# Patient Record
Sex: MALE | Race: WHITE | NOT HISPANIC OR LATINO | Employment: OTHER | URBAN - METROPOLITAN AREA
[De-identification: names, ages, dates, MRNs, and addresses within clinical notes are randomized per-mention and may not be internally consistent; named-entity substitution may affect disease eponyms.]

---

## 2018-05-05 PROBLEM — J18.9 PNEUMONIA: Status: ACTIVE | Noted: 2018-05-05

## 2018-05-06 ENCOUNTER — HOSPITAL ENCOUNTER (INPATIENT)
Facility: HOSPITAL | Age: 63
LOS: 26 days | Discharge: NURSING, REHAB OR LONG-TERM CARE | DRG: 871 | End: 2018-06-01
Attending: INTERNAL MEDICINE | Admitting: INTERNAL MEDICINE
Payer: MEDICAID

## 2018-05-06 DIAGNOSIS — J84.10 FIBROSIS OF LUNG: ICD-10-CM

## 2018-05-06 DIAGNOSIS — J18.9 PNEUMONIA: ICD-10-CM

## 2018-05-06 DIAGNOSIS — Z74.09 IMPAIRED MOBILITY AND ADLS: ICD-10-CM

## 2018-05-06 DIAGNOSIS — I50.9 HEART FAILURE: ICD-10-CM

## 2018-05-06 DIAGNOSIS — R06.03 RESPIRATORY DISTRESS: ICD-10-CM

## 2018-05-06 DIAGNOSIS — E44.0 MODERATE MALNUTRITION: ICD-10-CM

## 2018-05-06 DIAGNOSIS — E83.42 HYPOMAGNESEMIA: ICD-10-CM

## 2018-05-06 DIAGNOSIS — N17.9 AKI (ACUTE KIDNEY INJURY): Primary | ICD-10-CM

## 2018-05-06 DIAGNOSIS — R00.0 TACHYCARDIA: ICD-10-CM

## 2018-05-06 DIAGNOSIS — R26.9 GAIT DISTURBANCE: ICD-10-CM

## 2018-05-06 DIAGNOSIS — J96.02 ACUTE RESPIRATORY FAILURE WITH HYPOXIA AND HYPERCAPNIA: ICD-10-CM

## 2018-05-06 DIAGNOSIS — Z78.9 IMPAIRED MOBILITY AND ADLS: ICD-10-CM

## 2018-05-06 DIAGNOSIS — J96.01 ACUTE RESPIRATORY FAILURE WITH HYPOXIA AND HYPERCAPNIA: ICD-10-CM

## 2018-05-06 DIAGNOSIS — J69.0 ASPIRATION PNEUMONIA OF BOTH LUNGS, UNSPECIFIED ASPIRATION PNEUMONIA TYPE, UNSPECIFIED PART OF LUNG: ICD-10-CM

## 2018-05-06 DIAGNOSIS — I71.20 THORACIC AORTIC ANEURYSM WITHOUT RUPTURE: ICD-10-CM

## 2018-05-06 DIAGNOSIS — J69.0 ASPIRATION PNEUMONIA: ICD-10-CM

## 2018-05-06 PROBLEM — E87.1 HYPONATREMIA: Status: ACTIVE | Noted: 2018-05-06

## 2018-05-06 PROBLEM — A41.9 SEPSIS: Status: ACTIVE | Noted: 2018-05-06

## 2018-05-06 PROBLEM — J96.92 RESPIRATORY FAILURE WITH HYPOXIA AND HYPERCAPNIA: Status: ACTIVE | Noted: 2018-05-06

## 2018-05-06 PROBLEM — J96.91 RESPIRATORY FAILURE WITH HYPOXIA AND HYPERCAPNIA: Status: ACTIVE | Noted: 2018-05-06

## 2018-05-06 LAB
ABO + RH BLD: NORMAL
ALBUMIN SERPL BCP-MCNC: 1.6 G/DL
ALLENS TEST: ABNORMAL
ALLENS TEST: ABNORMAL
ALP SERPL-CCNC: 77 U/L
ALT SERPL W/O P-5'-P-CCNC: 12 U/L
ANION GAP SERPL CALC-SCNC: 10 MMOL/L
ANION GAP SERPL CALC-SCNC: 12 MMOL/L
ANION GAP SERPL CALC-SCNC: 12 MMOL/L
ANION GAP SERPL CALC-SCNC: 17 MMOL/L
ANION GAP SERPL CALC-SCNC: NORMAL MMOL/L
ANISOCYTOSIS BLD QL SMEAR: SLIGHT
APTT BLDCRRT: 23.4 SEC
AST SERPL-CCNC: 21 U/L
BACTERIA #/AREA URNS AUTO: ABNORMAL /HPF
BASOPHILS # BLD AUTO: 0.05 K/UL
BASOPHILS NFR BLD: 0.2 %
BILIRUB SERPL-MCNC: 0.3 MG/DL
BILIRUB UR QL STRIP: NEGATIVE
BLD GP AB SCN CELLS X3 SERPL QL: NORMAL
BNP SERPL-MCNC: 183 PG/ML
BUN SERPL-MCNC: 36 MG/DL
BUN SERPL-MCNC: 39 MG/DL
BUN SERPL-MCNC: 41 MG/DL
BUN SERPL-MCNC: 42 MG/DL
BUN SERPL-MCNC: NORMAL MG/DL
C DIFF GDH STL QL: NEGATIVE
C DIFF TOX A+B STL QL IA: NEGATIVE
CALCIUM SERPL-MCNC: 8.7 MG/DL
CALCIUM SERPL-MCNC: 8.8 MG/DL
CALCIUM SERPL-MCNC: 8.9 MG/DL
CALCIUM SERPL-MCNC: 9.1 MG/DL
CALCIUM SERPL-MCNC: NORMAL MG/DL
CHLORIDE SERPL-SCNC: 94 MMOL/L
CHLORIDE SERPL-SCNC: 98 MMOL/L
CHLORIDE SERPL-SCNC: 99 MMOL/L
CHLORIDE SERPL-SCNC: 99 MMOL/L
CHLORIDE SERPL-SCNC: NORMAL MMOL/L
CK SERPL-CCNC: 37 U/L
CLARITY UR REFRACT.AUTO: ABNORMAL
CO2 SERPL-SCNC: 23 MMOL/L
CO2 SERPL-SCNC: 28 MMOL/L
CO2 SERPL-SCNC: NORMAL MMOL/L
COLOR UR AUTO: ABNORMAL
CREAT SERPL-MCNC: 1.5 MG/DL
CREAT SERPL-MCNC: 1.6 MG/DL
CREAT SERPL-MCNC: 1.6 MG/DL
CREAT SERPL-MCNC: 1.7 MG/DL
CREAT SERPL-MCNC: NORMAL MG/DL
DELSYS: ABNORMAL
DIFFERENTIAL METHOD: ABNORMAL
EOSINOPHIL # BLD AUTO: 0 K/UL
EOSINOPHIL NFR BLD: 0 %
ERYTHROCYTE [DISTWIDTH] IN BLOOD BY AUTOMATED COUNT: 13.2 %
ERYTHROCYTE [SEDIMENTATION RATE] IN BLOOD BY WESTERGREN METHOD: 14 MM/H
EST. GFR  (AFRICAN AMERICAN): 48.9 ML/MIN/1.73 M^2
EST. GFR  (AFRICAN AMERICAN): 52.6 ML/MIN/1.73 M^2
EST. GFR  (AFRICAN AMERICAN): 52.6 ML/MIN/1.73 M^2
EST. GFR  (AFRICAN AMERICAN): 56.9 ML/MIN/1.73 M^2
EST. GFR  (AFRICAN AMERICAN): NORMAL ML/MIN/1.73 M^2
EST. GFR  (NON AFRICAN AMERICAN): 42.3 ML/MIN/1.73 M^2
EST. GFR  (NON AFRICAN AMERICAN): 45.5 ML/MIN/1.73 M^2
EST. GFR  (NON AFRICAN AMERICAN): 45.5 ML/MIN/1.73 M^2
EST. GFR  (NON AFRICAN AMERICAN): 49.2 ML/MIN/1.73 M^2
EST. GFR  (NON AFRICAN AMERICAN): NORMAL ML/MIN/1.73 M^2
FIO2: 25
FLUAV AG SPEC QL IA: NEGATIVE
FLUBV AG SPEC QL IA: NEGATIVE
GLUCOSE SERPL-MCNC: 106 MG/DL
GLUCOSE SERPL-MCNC: 110 MG/DL
GLUCOSE SERPL-MCNC: 118 MG/DL
GLUCOSE SERPL-MCNC: 132 MG/DL
GLUCOSE SERPL-MCNC: NORMAL MG/DL
GLUCOSE UR QL STRIP: NEGATIVE
HCO3 UR-SCNC: 25.4 MMOL/L (ref 24–28)
HCO3 UR-SCNC: 29.4 MMOL/L (ref 24–28)
HCT VFR BLD AUTO: 27.4 %
HGB BLD-MCNC: 8.6 G/DL
HGB UR QL STRIP: ABNORMAL
HYALINE CASTS UR QL AUTO: 0 /LPF
IMM GRANULOCYTES # BLD AUTO: 0.21 K/UL
IMM GRANULOCYTES NFR BLD AUTO: 1 %
INR PPP: 1.1
KETONES UR QL STRIP: NEGATIVE
LACTATE SERPL-SCNC: 1.6 MMOL/L
LEUKOCYTE ESTERASE UR QL STRIP: ABNORMAL
LYMPHOCYTES # BLD AUTO: 0.5 K/UL
LYMPHOCYTES NFR BLD: 2.2 %
MAGNESIUM SERPL-MCNC: 1.3 MG/DL
MAGNESIUM SERPL-MCNC: 1.9 MG/DL
MCH RBC QN AUTO: 30.3 PG
MCHC RBC AUTO-ENTMCNC: 31.4 G/DL
MCV RBC AUTO: 97 FL
MICROSCOPIC COMMENT: ABNORMAL
MODE: ABNORMAL
MONOCYTES # BLD AUTO: 1.3 K/UL
MONOCYTES NFR BLD: 6.4 %
NEUTROPHILS # BLD AUTO: 18.4 K/UL
NEUTROPHILS NFR BLD: 90.2 %
NITRITE UR QL STRIP: NEGATIVE
NRBC BLD-RTO: 0 /100 WBC
OSMOLALITY UR: 303 MOSM/KG
OVALOCYTES BLD QL SMEAR: ABNORMAL
PCO2 BLDA: 34.9 MMHG (ref 35–45)
PCO2 BLDA: 52 MMHG (ref 35–45)
PEEP: 5
PH SMN: 7.36 [PH] (ref 7.35–7.45)
PH SMN: 7.47 [PH] (ref 7.35–7.45)
PH UR STRIP: 5 [PH] (ref 5–8)
PHOSPHATE SERPL-MCNC: 1.9 MG/DL
PLATELET # BLD AUTO: 310 K/UL
PMV BLD AUTO: 8.9 FL
PO2 BLDA: 46 MMHG (ref 80–100)
PO2 BLDA: 48 MMHG (ref 40–60)
POC BE: 2 MMOL/L
POC BE: 4 MMOL/L
POC SATURATED O2: 81 % (ref 95–100)
POC SATURATED O2: 85 % (ref 95–100)
POC TCO2: 26 MMOL/L (ref 23–27)
POC TCO2: 31 MMOL/L (ref 24–29)
POIKILOCYTOSIS BLD QL SMEAR: SLIGHT
POLYCHROMASIA BLD QL SMEAR: ABNORMAL
POTASSIUM SERPL-SCNC: 3.5 MMOL/L
POTASSIUM SERPL-SCNC: 3.5 MMOL/L
POTASSIUM SERPL-SCNC: 3.9 MMOL/L
POTASSIUM SERPL-SCNC: 4.1 MMOL/L
POTASSIUM SERPL-SCNC: NORMAL MMOL/L
PROCALCITONIN SERPL IA-MCNC: 9.92 NG/ML
PROT SERPL-MCNC: 5.9 G/DL
PROT UR QL STRIP: ABNORMAL
PROTHROMBIN TIME: 11.4 SEC
RBC # BLD AUTO: 2.84 M/UL
RBC #/AREA URNS AUTO: 57 /HPF (ref 0–4)
SAMPLE: ABNORMAL
SAMPLE: ABNORMAL
SITE: ABNORMAL
SITE: ABNORMAL
SODIUM SERPL-SCNC: 132 MMOL/L
SODIUM SERPL-SCNC: 133 MMOL/L
SODIUM SERPL-SCNC: 134 MMOL/L
SODIUM SERPL-SCNC: 139 MMOL/L
SODIUM SERPL-SCNC: NORMAL MMOL/L
SODIUM UR-SCNC: 88 MMOL/L
SP GR UR STRIP: 1.01 (ref 1–1.03)
SPECIMEN SOURCE: NORMAL
TOXIC GRANULES BLD QL SMEAR: PRESENT
TROPONIN I SERPL DL<=0.01 NG/ML-MCNC: <0.006 NG/ML
URN SPEC COLLECT METH UR: ABNORMAL
UROBILINOGEN UR STRIP-ACNC: NEGATIVE EU/DL
VANCOMYCIN TROUGH SERPL-MCNC: 14.4 UG/ML
VT: 450
WBC # BLD AUTO: 20.44 K/UL
WBC #/AREA URNS AUTO: 16 /HPF (ref 0–5)

## 2018-05-06 PROCEDURE — 25000003 PHARM REV CODE 250: Performed by: INTERNAL MEDICINE

## 2018-05-06 PROCEDURE — 82803 BLOOD GASES ANY COMBINATION: CPT

## 2018-05-06 PROCEDURE — 80048 BASIC METABOLIC PNL TOTAL CA: CPT

## 2018-05-06 PROCEDURE — 37799 UNLISTED PX VASCULAR SURGERY: CPT

## 2018-05-06 PROCEDURE — 80202 ASSAY OF VANCOMYCIN: CPT

## 2018-05-06 PROCEDURE — 87040 BLOOD CULTURE FOR BACTERIA: CPT | Mod: 59

## 2018-05-06 PROCEDURE — 87070 CULTURE OTHR SPECIMN AEROBIC: CPT

## 2018-05-06 PROCEDURE — 94150 VITAL CAPACITY TEST: CPT

## 2018-05-06 PROCEDURE — 87205 SMEAR GRAM STAIN: CPT

## 2018-05-06 PROCEDURE — 83935 ASSAY OF URINE OSMOLALITY: CPT

## 2018-05-06 PROCEDURE — 85730 THROMBOPLASTIN TIME PARTIAL: CPT

## 2018-05-06 PROCEDURE — 80048 BASIC METABOLIC PNL TOTAL CA: CPT | Mod: 91

## 2018-05-06 PROCEDURE — 83735 ASSAY OF MAGNESIUM: CPT

## 2018-05-06 PROCEDURE — 27000221 HC OXYGEN, UP TO 24 HOURS

## 2018-05-06 PROCEDURE — 83605 ASSAY OF LACTIC ACID: CPT

## 2018-05-06 PROCEDURE — 99900026 HC AIRWAY MAINTENANCE (STAT)

## 2018-05-06 PROCEDURE — 36592 COLLECT BLOOD FROM PICC: CPT

## 2018-05-06 PROCEDURE — 99900035 HC TECH TIME PER 15 MIN (STAT)

## 2018-05-06 PROCEDURE — 80053 COMPREHEN METABOLIC PANEL: CPT

## 2018-05-06 PROCEDURE — 82550 ASSAY OF CK (CPK): CPT

## 2018-05-06 PROCEDURE — 99900017 HC EXTUBATION W/PARAMETERS (STAT)

## 2018-05-06 PROCEDURE — 84100 ASSAY OF PHOSPHORUS: CPT

## 2018-05-06 PROCEDURE — 63600175 PHARM REV CODE 636 W HCPCS: Performed by: STUDENT IN AN ORGANIZED HEALTH CARE EDUCATION/TRAINING PROGRAM

## 2018-05-06 PROCEDURE — 87449 NOS EACH ORGANISM AG IA: CPT | Mod: 91

## 2018-05-06 PROCEDURE — 94003 VENT MGMT INPAT SUBQ DAY: CPT

## 2018-05-06 PROCEDURE — 84484 ASSAY OF TROPONIN QUANT: CPT

## 2018-05-06 PROCEDURE — 63600175 PHARM REV CODE 636 W HCPCS: Performed by: INTERNAL MEDICINE

## 2018-05-06 PROCEDURE — 25000242 PHARM REV CODE 250 ALT 637 W/ HCPCS: Performed by: INTERNAL MEDICINE

## 2018-05-06 PROCEDURE — 99900022

## 2018-05-06 PROCEDURE — 5A1935Z RESPIRATORY VENTILATION, LESS THAN 24 CONSECUTIVE HOURS: ICD-10-PCS | Performed by: INTERNAL MEDICINE

## 2018-05-06 PROCEDURE — 36415 COLL VENOUS BLD VENIPUNCTURE: CPT

## 2018-05-06 PROCEDURE — 86901 BLOOD TYPING SEROLOGIC RH(D): CPT

## 2018-05-06 PROCEDURE — 84300 ASSAY OF URINE SODIUM: CPT

## 2018-05-06 PROCEDURE — 25000003 PHARM REV CODE 250: Performed by: STUDENT IN AN ORGANIZED HEALTH CARE EDUCATION/TRAINING PROGRAM

## 2018-05-06 PROCEDURE — 81001 URINALYSIS AUTO W/SCOPE: CPT

## 2018-05-06 PROCEDURE — 94640 AIRWAY INHALATION TREATMENT: CPT

## 2018-05-06 PROCEDURE — 84145 PROCALCITONIN (PCT): CPT

## 2018-05-06 PROCEDURE — 83880 ASSAY OF NATRIURETIC PEPTIDE: CPT

## 2018-05-06 PROCEDURE — 87086 URINE CULTURE/COLONY COUNT: CPT

## 2018-05-06 PROCEDURE — 94761 N-INVAS EAR/PLS OXIMETRY MLT: CPT

## 2018-05-06 PROCEDURE — 20000000 HC ICU ROOM

## 2018-05-06 PROCEDURE — 87449 NOS EACH ORGANISM AG IA: CPT

## 2018-05-06 PROCEDURE — C9113 INJ PANTOPRAZOLE SODIUM, VIA: HCPCS | Performed by: INTERNAL MEDICINE

## 2018-05-06 PROCEDURE — 87400 INFLUENZA A/B EACH AG IA: CPT | Mod: 59

## 2018-05-06 PROCEDURE — 94002 VENT MGMT INPAT INIT DAY: CPT

## 2018-05-06 PROCEDURE — 85610 PROTHROMBIN TIME: CPT

## 2018-05-06 PROCEDURE — 99291 CRITICAL CARE FIRST HOUR: CPT | Mod: ,,, | Performed by: INTERNAL MEDICINE

## 2018-05-06 RX ORDER — DEXMEDETOMIDINE HYDROCHLORIDE 4 UG/ML
0.2 INJECTION, SOLUTION INTRAVENOUS CONTINUOUS
Status: DISCONTINUED | OUTPATIENT
Start: 2018-05-06 | End: 2018-05-08

## 2018-05-06 RX ORDER — FOLIC ACID 1 MG/1
1 TABLET ORAL DAILY
Status: DISCONTINUED | OUTPATIENT
Start: 2018-05-07 | End: 2018-05-07

## 2018-05-06 RX ORDER — ENOXAPARIN SODIUM 100 MG/ML
40 INJECTION SUBCUTANEOUS EVERY 24 HOURS
Status: DISCONTINUED | OUTPATIENT
Start: 2018-05-06 | End: 2018-06-01 | Stop reason: HOSPADM

## 2018-05-06 RX ORDER — MAGNESIUM SULFATE HEPTAHYDRATE 40 MG/ML
2 INJECTION, SOLUTION INTRAVENOUS ONCE
Status: COMPLETED | OUTPATIENT
Start: 2018-05-06 | End: 2018-05-06

## 2018-05-06 RX ORDER — VANCOMYCIN/0.9 % SOD CHLORIDE 1 G/100 ML
1000 PLASTIC BAG, INJECTION (ML) INTRAVENOUS
Status: DISCONTINUED | OUTPATIENT
Start: 2018-05-06 | End: 2018-05-07

## 2018-05-06 RX ORDER — POTASSIUM CHLORIDE 20 MEQ/15ML
40 SOLUTION ORAL ONCE
Status: COMPLETED | OUTPATIENT
Start: 2018-05-06 | End: 2018-05-06

## 2018-05-06 RX ORDER — PANTOPRAZOLE SODIUM 40 MG/10ML
40 INJECTION, POWDER, LYOPHILIZED, FOR SOLUTION INTRAVENOUS EVERY 24 HOURS
Status: DISCONTINUED | OUTPATIENT
Start: 2018-05-06 | End: 2018-05-06

## 2018-05-06 RX ORDER — FENTANYL CITRATE-0.9 % NACL/PF 10 MCG/ML
PLASTIC BAG, INJECTION (ML) INTRAVENOUS CONTINUOUS
Status: DISCONTINUED | OUTPATIENT
Start: 2018-05-06 | End: 2018-05-06

## 2018-05-06 RX ORDER — PANTOPRAZOLE SODIUM 40 MG/10ML
40 INJECTION, POWDER, LYOPHILIZED, FOR SOLUTION INTRAVENOUS DAILY
Status: DISCONTINUED | OUTPATIENT
Start: 2018-05-06 | End: 2018-05-06

## 2018-05-06 RX ORDER — IPRATROPIUM BROMIDE AND ALBUTEROL SULFATE 2.5; .5 MG/3ML; MG/3ML
3 SOLUTION RESPIRATORY (INHALATION)
Status: DISCONTINUED | OUTPATIENT
Start: 2018-05-06 | End: 2018-05-17

## 2018-05-06 RX ORDER — THIAMINE HCL 100 MG
100 TABLET ORAL DAILY
Status: DISCONTINUED | OUTPATIENT
Start: 2018-05-07 | End: 2018-05-07

## 2018-05-06 RX ORDER — SODIUM CHLORIDE 0.9 % (FLUSH) 0.9 %
3 SYRINGE (ML) INJECTION
Status: DISCONTINUED | OUTPATIENT
Start: 2018-05-06 | End: 2018-06-01 | Stop reason: HOSPADM

## 2018-05-06 RX ADMIN — IPRATROPIUM BROMIDE AND ALBUTEROL SULFATE 3 ML: .5; 3 SOLUTION RESPIRATORY (INHALATION) at 07:05

## 2018-05-06 RX ADMIN — ENOXAPARIN SODIUM 40 MG: 100 INJECTION SUBCUTANEOUS at 04:05

## 2018-05-06 RX ADMIN — POTASSIUM CHLORIDE 40 MEQ: 20 SOLUTION ORAL at 06:05

## 2018-05-06 RX ADMIN — VANCOMYCIN HYDROCHLORIDE 1 G: 10 INJECTION, POWDER, LYOPHILIZED, FOR SOLUTION INTRAVENOUS at 06:05

## 2018-05-06 RX ADMIN — DEXTROSE 40 MG: 50 INJECTION, SOLUTION INTRAVENOUS at 08:05

## 2018-05-06 RX ADMIN — AZITHROMYCIN MONOHYDRATE 500 MG: 500 INJECTION, POWDER, LYOPHILIZED, FOR SOLUTION INTRAVENOUS at 07:05

## 2018-05-06 RX ADMIN — Medication 2500 MCG: at 01:05

## 2018-05-06 RX ADMIN — MAGNESIUM SULFATE IN WATER 2 G: 40 INJECTION, SOLUTION INTRAVENOUS at 04:05

## 2018-05-06 RX ADMIN — PIPERACILLIN AND TAZOBACTAM 4.5 G: 4; .5 INJECTION, POWDER, LYOPHILIZED, FOR SOLUTION INTRAVENOUS; PARENTERAL at 04:05

## 2018-05-06 RX ADMIN — PIPERACILLIN AND TAZOBACTAM 4.5 G: 4; .5 INJECTION, POWDER, LYOPHILIZED, FOR SOLUTION INTRAVENOUS; PARENTERAL at 02:05

## 2018-05-06 RX ADMIN — PIPERACILLIN AND TAZOBACTAM 4.5 G: 4; .5 INJECTION, POWDER, LYOPHILIZED, FOR SOLUTION INTRAVENOUS; PARENTERAL at 10:05

## 2018-05-06 RX ADMIN — SODIUM CHLORIDE 1000 ML: 0.9 INJECTION, SOLUTION INTRAVENOUS at 03:05

## 2018-05-06 RX ADMIN — IPRATROPIUM BROMIDE AND ALBUTEROL SULFATE 3 ML: .5; 3 SOLUTION RESPIRATORY (INHALATION) at 10:05

## 2018-05-06 RX ADMIN — SODIUM CHLORIDE 500 ML: 9 INJECTION, SOLUTION INTRAVENOUS at 06:05

## 2018-05-06 RX ADMIN — DEXMEDETOMIDINE HYDROCHLORIDE 0.5 MCG/KG/HR: 100 INJECTION, SOLUTION, CONCENTRATE INTRAVENOUS at 08:05

## 2018-05-06 RX ADMIN — IPRATROPIUM BROMIDE AND ALBUTEROL SULFATE 3 ML: .5; 3 SOLUTION RESPIRATORY (INHALATION) at 01:05

## 2018-05-06 RX ADMIN — DEXMEDETOMIDINE HYDROCHLORIDE 0.2 MCG/KG/HR: 100 INJECTION, SOLUTION, CONCENTRATE INTRAVENOUS at 02:05

## 2018-05-06 NOTE — PLAN OF CARE
Problem: Patient Care Overview  Goal: Plan of Care Review  Dx: Pneumonia   Hx: HTN, COPD and alcoholism, who is a transfer from [facility] ICU to [facility] on mechanical ventilation after being found unresponsive at home    4/5: Transferred to [facility] on mechanical ventilation. Dopamine gtt off.   4/6: Pt extubated         RN  MAP>65    *Pt likes [employer] channel 3     Pt AAOx4. VSS. No trauma, injury, or falls throughout the day. Pt extubated this afternoon and is now on 3L NC. Urine output >50cc/hr from hess. Pt has denied pain and nausea this evening. Pt is resting in bed. No distress noted. Will continue to monitor.

## 2018-05-06 NOTE — HPI
The patient is a 64 y/o M new to our system, with HTN, COPD and alcoholism, who is transfer from [facility] ICU to [facility] on mechanical ventilation.    The patient lives with his brother across the street from his sister. He was found unresponsive in a chair with small amount of blood in his mouth by his nephew and taken to the hospital by EMS on 5/3. Patient drinks every day and has been having frequent falls recently. Per sister he has quit smoking 15 years ago.  In the ED at OSH he was found to be hypoxic and hypotensive, with undetectable EtOH levels, leukocytosis (24K) and hyponatremia (119) in the labs. he received narcan x1, IV fluids, started on metronidazole and ceftriaxone (also x1 of zosyn, azithromycin, vancomycin, ceftaroline) for aspiration pneumonia, and was put on 15L of Oxygen with non-rebreather, which improved his mental status and BP, however on 4/5 upon weaning off of oxygen he developed hypoxic hypercapnic respiratory failure and subsequent acidosis (pH 7.10), therefore he was intubated and transferred to the ICU. Patient arrived to [facility] on dopamine gtt which per notes was started to maintain BP in the setting of hyponatremia.

## 2018-05-06 NOTE — ASSESSMENT & PLAN NOTE
Former smoker with Hx of COPD, overlapped with volume overload due to acute decompensated HF  2+ pitting edema on lower extremities + bilateral rhonchi and wheezing on the exam    PFT on 5/31/2017 at OSH:    Moderate obstructive + mild restrictive pattern    FEV1/FVC 60%  2D echo on 2/2018 at OSH:    EF 55-60%   Normal LA pressure   mild TVR  BNP on 5/4: 349    - Repeat CXR: fibrotic changes in DIAMANTE + patchy infiltration specially in RML   - Will obtain ECG and Troponin, and repeat BNP  - will obtain ABG   - will continue mechanical ventilation for now, possible SBT during the day  - will consider diuresis after the BP is improved  - will require PT/OT evaluation after extubation

## 2018-05-06 NOTE — PLAN OF CARE
Problem: Patient Care Overview  Goal: Plan of Care Review  Outcome: Ongoing (interventions implemented as appropriate)  POC reviewed with patient who shows some evidence of learning through nodding and hand gestures. MAPS being maintained above 65, Precedex currently at 0.6. Banda output has been minimal ( see intake and output flow sheet). Pt remained free from falls throughout the shift VSS. No distress noted, will continue to monitor.

## 2018-05-06 NOTE — ASSESSMENT & PLAN NOTE
In initial labs at OSH:  EtOH undetectable, urine tox negative     - currently not agitated, no evidence of withdrawal or hepatic encephalopathy on exam  - LFTs within normal limits  - will start on thiamine and folic acid daily  - will monitor the electrolytes and replace as needed  - will need SLP evaluation after extubation

## 2018-05-06 NOTE — PROGRESS NOTES
Pt was transferred from [facility],was placed on vent settings per EMT,Beth Astorga was at bedside made vent changes,vbg was done she is aware of results,requested endotracheal tube be inserted to 26cm at lips,no abg was ordered at this time sats were adequate.

## 2018-05-06 NOTE — ASSESSMENT & PLAN NOTE
Initial Na at  which improved to 129   Differentials include abnormal renal filtration due to ROHITH vs low intravascular volume due to decompensated HF vs GI loss    - Repeated BMP upon arrival to [facility] 132  - Will monitor closely with BMP q8 hr   - will obtain urine Na and osmolality

## 2018-05-06 NOTE — H&P
[facility]  Critical Care Medicine  History & Physical    Patient Name: Ryder Boo  MRN: [id number]  Admission Date: 5/6/2018  Hospital Length of Stay: 0 days  Code Status: Full Code  Attending Physician: Uzair Jacobo MD   Primary Care Provider: Primary Doctor No   Principal Problem: <principal problem not specified>    Subjective:     HPI:  The patient is a 62 y/o M new to our system, with HTN, COPD and alcoholism, who is transfer from [facility] ICU to [facility] on mechanical ventilation.    The patient lives with his brother across the street from his sister. He was found unresponsive in a chair with small amount of blood in his mouth by his nephew and taken to the hospital by EMS on 5/3. Patient drinks every day and has been having frequent falls recently. Per sister he has quit smoking 15 years ago.  In the ED at OSH he was found to be hypoxic and hypotensive, with undetectable EtOH levels, leukocytosis (24K) and hyponatremia (119) in the labs. he received narcan x1, IV fluids, started on metronidazole and ceftriaxone (also x1 of zosyn, azithromycin, vancomycin, ceftaroline) for aspiration pneumonia, and was put on 15L of Oxygen with non-rebreather, which improved his mental status and BP, however on 4/5 upon weaning off of oxygen he developed hypoxic hypercapnic respiratory failure and subsequent acidosis (pH 7.10), therefore he was intubated and transferred to the ICU. Patient arrived to [facility] on dopamine gtt which per notes was started to maintain BP in the setting of hyponatremia.     Hospital/ICU Course:  No notes on file     No past medical history on file.    No past surgical history on file.    Review of patient's allergies indicates:  No Known Allergies    Family History     None        Social History Main Topics    Smoking status: Not on file    Smokeless tobacco: Not on file    Alcohol use Not on file    Drug use: Unknown    Sexual activity: Not on file      Review of  Systems   Unable to perform ROS: Intubated   Constitutional: Negative for fever.   Neurological: Negative for seizures.   Psychiatric/Behavioral: Negative for agitation. The patient is not nervous/anxious.      Objective:     Vital Signs (Most Recent):  Temp: 99.8 °F (37.7 °C) (05/06/18 0045)  Pulse: 105 (05/06/18 0045)  BP: (!) 132/98 (05/06/18 0045)  SpO2: 99 % (05/06/18 0045) Vital Signs (24h Range):  Temp:  [99.8 °F (37.7 °C)] 99.8 °F (37.7 °C)  Pulse:  [105] 105  SpO2:  [99 %] 99 %  BP: (132)/(98) 132/98      There is no height or weight on file to calculate BMI.    No intake or output data in the 24 hours ending 05/06/18 0134    Physical Exam   Constitutional: He appears well-developed and well-nourished. He is cooperative. No distress. He is intubated.   Disheveled, poor hygiene, in no apparent distress, comfortably interacting with providers by writing or hand gestures    HENT:   Head: Normocephalic and atraumatic.   Mouth/Throat: Mucous membranes are normal.   Eyes: Conjunctivae and EOM are normal. Pupils are equal, round, and reactive to light.   Neck: Normal range of motion.   Cardiovascular: Normal rate, regular rhythm and normal heart sounds.  Exam reveals no gallop.    No murmur heard.  Pulses:       Radial pulses are 2+ on the right side, and 2+ on the left side.        Dorsalis pedis pulses are 2+ on the right side, and 2+ on the left side.   Pulmonary/Chest: Effort normal. No accessory muscle usage. No tachypnea. He is intubated. No respiratory distress. He has no decreased breath sounds. He has wheezes (bilateral ). He has rhonchi. He has no rales.   Abdominal: Soft. Bowel sounds are normal. He exhibits no distension. There is generalized tenderness. There is no rigidity, no rebound and no guarding.   Musculoskeletal: Normal range of motion. He exhibits edema (2+ pitting, Bl LE). He exhibits no tenderness.   Neurological: He is alert. He displays no tremor. He displays no seizure activity.   He is  alert and comfortable  Follows all commands, good comprehension  Moves all 4 extremities symmetrically    Skin: Skin is warm. Capillary refill takes less than 2 seconds. No rash noted. He is not diaphoretic. No cyanosis or erythema.   Psychiatric: He has a normal mood and affect. His behavior is normal.   Nursing note and vitals reviewed.      Vents:  Vent Mode: A/C (05/06/18 0045)  Set Rate: 14 bmp (05/06/18 0045)  Vt Set: 530 mL (05/06/18 0045)  PEEP/CPAP: 5 cmH20 (05/06/18 0045)  Oxygen Concentration (%): 80 (05/06/18 0045)  Peak Airway Pressure: 30 cmH2O (05/06/18 0045)  Total Ve: 10.8 mL (05/06/18 0045)  Lines/Drains/Airways     Peripheral Intravenous Line                 Peripheral IV - Single Lumen 05/05/18 Left Antecubital 1 day         Peripheral IV - Single Lumen 05/05/18 Left Forearm 1 day         Peripheral IV - Single Lumen 05/05/18 Right Hand 1 day         Peripheral IV - Single Lumen 05/06/18 0102 Right Forearm less than 1 day              Significant Labs:    CBC/Anemia Profile:    Recent Labs  Lab 05/06/18 0101   WBC 20.44*   HGB 8.6*   HCT 27.4*      MCV 97   RDW 13.2        Chemistries:    Recent Labs  Lab 05/06/18 0101   *   K 4.1   CL 94*   CO2 28   BUN 36*   CREATININE 1.5*   CALCIUM 8.7   ALBUMIN 1.6*   PROT 5.9*   BILITOT 0.3   ALKPHOS 77   ALT 12   AST 21   MG 1.3*   PHOS 1.9*       ABGs:   Recent Labs  Lab 05/06/18 0104   PH 7.360   PCO2 52.0*   HCO3 29.4*   POCSATURATED 81*   BE 4     Blood Culture: No results for input(s): LABBLOO in the last 48 hours.  Coagulation:   Recent Labs  Lab 05/06/18 0101   INR 1.1   APTT 23.4     Lactic Acid:   Recent Labs  Lab 05/06/18 0101   LACTATE 1.6     Urine Culture: No results for input(s): LABURIN in the last 48 hours.    Significant Imaging: CXR: I have reviewed all pertinent results/findings within the past 24 hours and my personal findings are:  multifocal areas of consolidations with worsening of the R middle lobe infiltration  compared to the previous imaging    Assessment/Plan:     Psychiatric   Alcoholism /alcohol abuse    In initial labs at OSH:  EtOH undetectable, urine tox negative     - currently not agitated, no evidence of withdrawal or hepatic encephalopathy on exam  - LFTs within normal limits  - will start on thiamine and folic acid daily  - will monitor the electrolytes and replace as needed  - will need SLP evaluation after extubation        Pulmonary   Respiratory failure with hypoxia and hypercapnia    Former smoker with Hx of COPD, overlapped with volume overload due to acute decompensated HF  2+ pitting edema on lower extremities + bilateral rhonchi and wheezing on the exam    PFT on 5/31/2017 at OSH:    Moderate obstructive + mild restrictive pattern    FEV1/FVC 60%  2D echo on 2/2018 at OSH:    EF 55-60%   Normal LA pressure   mild TVR  BNP on 5/4: 349    - Repeat CXR: fibrotic changes in DIAMANTE + patchy infiltration specially in RML   - Will obtain ECG and Troponin, and repeat BNP  - will obtain ABG   - will continue mechanical ventilation for now, possible SBT during the day  - will consider diuresis after the BP is improved  - will require PT/OT evaluation after extubation            Pneumonia    At OSH:  WBC elevated up to 38K  CXR with bilateral multifocal infiltration (more pronounced in RML)  Most likely due to aspiration secondary to AMS    - currently afebrile, mildly tachypneic, with scattered rhonchi and wheezing on exam  - Repeat CXR: with interval worsening of infiltration/consolidation in RML  - repeat WBC 20K (90% PMN), procalcitonin -> 9.9  - obtained influenza Ag -> negative, will obtain RVP  - respiratory cultures pending  - will obtain Legionella Ag  - will cover broadly with vancomycin + zosyn + azithromycin  - will continue mechanical ventilation for now with possible SBT during the day        Renal/   ROHITH (acute kidney injury)    Initial Cr at OSH elevated to 2.8 which trended down with IV  fluids  Most likely due to poor PO intake and dehydration    - Repeat BMP upon arrival to [facility] 1.5  - Will obtain urine studies  - Will obtain renal US  - will monitor UOP closely          Hyponatremia    Initial Na at  which improved to 129   Differentials include abnormal renal filtration due to ROHITH vs low intravascular volume due to decompensated HF vs GI loss    - Repeated BMP upon arrival to [facility] 132  - Will monitor closely with BMP q8 hr   - will obtain urine Na and osmolality        ID   Sepsis    At OSH:  Lactic acid 4.1 -> 2.5 -> 1.6  WBC 24K -> 38K  U/a negative  CXR with evidence of RML consolidation  Upon arrival to [facility], afebrile, RR 28-33 on ventilator, BP 82/54  WBC 20, lactic acid 1.6, procal 9.9  qSOFA 1/3, SIRS 2/4    - Blood, urine and respiratory cultures pending  - will repeat u/a and CXR -> interval worsening of infiltrations  - will give NS bolus 1L  And monitor his response  - if no appropriate response will need a central line and vasopressors to maintain MAP>65  - will continue on broad spectrum abx            Critical Care Daily Checklist:    A: Awake: RASS Goal/Actual Goal:    Actual: [place] Agitation Sedation Scale (RASS): Alert and calm   B: Spontaneous Breathing Trial Performed?     C: SAT & SBT Coordinated?  No                      D: Delirium: CAM-ICU     E: Early Mobility Performed? No   F: Feeding Goal:    Status:     Current Diet Order   Procedures    Diet NPO      AS: Analgesia/Sedation precedex   T: Thromboembolic Prophylaxis SQH   H: HOB > 300 Yes   U: Stress Ulcer Prophylaxis (if needed) PPI   G: Glucose Control No   B: Bowel Function Stool Occurrence: 0   I: Indwelling Catheter (Lines & Banda) Necessity Banda   D: De-escalation of Antimicrobials/Pharmacotherapies No    Plan for the day/ETD Continue BS abx    Code Status:  Family/Goals of Care: Full Code         Critical secondary to Patient has a condition that poses threat to life and bodily function: Severe  Respiratory Distress and sepsis     Critical care was time spent personally by me on the following activities: development of treatment plan with patient or surrogate and bedside caregivers, discussions with consultants, evaluation of patient's response to treatment, examination of patient, ordering and performing treatments and interventions, ordering and review of laboratory studies, ordering and review of radiographic studies, pulse oximetry, re-evaluation of patient's condition. This critical care time did not overlap with that of any other provider or involve time for any procedures.     Fanny Carpio MD  Critical Care Medicine  [facility]

## 2018-05-06 NOTE — PROGRESS NOTES
Pt extubated per MD order. Pt tolerated extubation well and placed on 5L NC. Will continue to monitor.

## 2018-05-06 NOTE — SUBJECTIVE & OBJECTIVE
No past medical history on file.    No past surgical history on file.    Review of patient's allergies indicates:  No Known Allergies    Family History     None        Social History Main Topics    Smoking status: Not on file    Smokeless tobacco: Not on file    Alcohol use Not on file    Drug use: Unknown    Sexual activity: Not on file      Review of Systems   Unable to perform ROS: Intubated   Constitutional: Negative for fever.   Neurological: Negative for seizures.   Psychiatric/Behavioral: Negative for agitation. The patient is not nervous/anxious.      Objective:     Vital Signs (Most Recent):  Temp: 99.8 °F (37.7 °C) (05/06/18 0045)  Pulse: 105 (05/06/18 0045)  BP: (!) 132/98 (05/06/18 0045)  SpO2: 99 % (05/06/18 0045) Vital Signs (24h Range):  Temp:  [99.8 °F (37.7 °C)] 99.8 °F (37.7 °C)  Pulse:  [105] 105  SpO2:  [99 %] 99 %  BP: (132)/(98) 132/98      There is no height or weight on file to calculate BMI.    No intake or output data in the 24 hours ending 05/06/18 0134    Physical Exam   Constitutional: He appears well-developed and well-nourished. He is cooperative. No distress. He is intubated.   Disheveled, poor hygiene, in no apparent distress, comfortably interacting with providers by writing or hand gestures    HENT:   Head: Normocephalic and atraumatic.   Mouth/Throat: Mucous membranes are normal.   Eyes: Conjunctivae and EOM are normal. Pupils are equal, round, and reactive to light.   Neck: Normal range of motion.   Cardiovascular: Normal rate, regular rhythm and normal heart sounds.  Exam reveals no gallop.    No murmur heard.  Pulses:       Radial pulses are 2+ on the right side, and 2+ on the left side.        Dorsalis pedis pulses are 2+ on the right side, and 2+ on the left side.   Pulmonary/Chest: Effort normal. No accessory muscle usage. No tachypnea. He is intubated. No respiratory distress. He has no decreased breath sounds. He has wheezes (bilateral ). He has rhonchi. He has no  rales.   Abdominal: Soft. Bowel sounds are normal. He exhibits no distension. There is generalized tenderness. There is no rigidity, no rebound and no guarding.   Musculoskeletal: Normal range of motion. He exhibits edema (2+ pitting, Bl LE). He exhibits no tenderness.   Neurological: He is alert. He displays no tremor. He displays no seizure activity.   He is alert and comfortable  Follows all commands, good comprehension  Moves all 4 extremities symmetrically    Skin: Skin is warm. Capillary refill takes less than 2 seconds. No rash noted. He is not diaphoretic. No cyanosis or erythema.   Psychiatric: He has a normal mood and affect. His behavior is normal.   Nursing note and vitals reviewed.      Vents:  Vent Mode: A/C (05/06/18 0045)  Set Rate: 14 bmp (05/06/18 0045)  Vt Set: 530 mL (05/06/18 0045)  PEEP/CPAP: 5 cmH20 (05/06/18 0045)  Oxygen Concentration (%): 80 (05/06/18 0045)  Peak Airway Pressure: 30 cmH2O (05/06/18 0045)  Total Ve: 10.8 mL (05/06/18 0045)  Lines/Drains/Airways     Peripheral Intravenous Line                 Peripheral IV - Single Lumen 05/05/18 Left Antecubital 1 day         Peripheral IV - Single Lumen 05/05/18 Left Forearm 1 day         Peripheral IV - Single Lumen 05/05/18 Right Hand 1 day         Peripheral IV - Single Lumen 05/06/18 0102 Right Forearm less than 1 day              Significant Labs:    CBC/Anemia Profile:    Recent Labs  Lab 05/06/18 0101   WBC 20.44*   HGB 8.6*   HCT 27.4*      MCV 97   RDW 13.2        Chemistries:    Recent Labs  Lab 05/06/18 0101   *   K 4.1   CL 94*   CO2 28   BUN 36*   CREATININE 1.5*   CALCIUM 8.7   ALBUMIN 1.6*   PROT 5.9*   BILITOT 0.3   ALKPHOS 77   ALT 12   AST 21   MG 1.3*   PHOS 1.9*       ABGs:   Recent Labs  Lab 05/06/18 0104   PH 7.360   PCO2 52.0*   HCO3 29.4*   POCSATURATED 81*   BE 4     Blood Culture: No results for input(s): LABBLOO in the last 48 hours.  Coagulation:   Recent Labs  Lab 05/06/18  0101   INR 1.1   APTT  23.4     Lactic Acid:   Recent Labs  Lab 05/06/18  0101   LACTATE 1.6     Urine Culture: No results for input(s): LABURIN in the last 48 hours.    Significant Imaging: CXR: I have reviewed all pertinent results/findings within the past 24 hours and my personal findings are:  multifocal areas of consolidations with worsening of the R middle lobe infiltration compared to the previous imaging

## 2018-05-06 NOTE — ASSESSMENT & PLAN NOTE
At OSH:  Lactic acid 4.1 -> 2.5 -> 1.6  WBC 24K -> 38K  U/a negative  CXR with evidence of RML consolidation  Upon arrival to [facility], afebrile, RR 28-33 on ventilator, BP 82/54  WBC 20, lactic acid 1.6, procal 9.9  qSOFA 1/3, SIRS 2/4    - Blood, urine and respiratory cultures pending  - will repeat u/a and CXR -> interval worsening of infiltrations  - will give NS bolus 1L  And monitor his response  - if no appropriate response will need a central line and vasopressors to maintain MAP>65  - will continue on broad spectrum abx

## 2018-05-06 NOTE — ASSESSMENT & PLAN NOTE
Initial Cr at OSH elevated to 2.8 which trended down with IV fluids  Most likely due to poor PO intake and dehydration    - Repeat BMP upon arrival to [facility] 1.5  - Will obtain urine studies  - Will obtain renal US  - will monitor UOP closely

## 2018-05-06 NOTE — ASSESSMENT & PLAN NOTE
At OSH:  WBC elevated up to 38K  CXR with bilateral multifocal infiltration (more pronounced in RML)  Most likely due to aspiration secondary to AMS    - currently afebrile, mildly tachypneic, with scattered rhonchi and wheezing on exam  - Repeat CXR: with interval worsening of infiltration/consolidation in RML  - repeat WBC 20K (90% PMN), procalcitonin -> 9.9  - obtained influenza Ag -> negative, will obtain RVP  - respiratory cultures pending  - will obtain Legionella Ag  - will cover broadly with vancomycin + zosyn + azithromycin  - will continue mechanical ventilation for now with possible SBT during the day

## 2018-05-07 LAB
ALLENS TEST: ABNORMAL
ANION GAP SERPL CALC-SCNC: 10 MMOL/L
ANION GAP SERPL CALC-SCNC: 10 MMOL/L
ANION GAP SERPL CALC-SCNC: 14 MMOL/L
ANION GAP SERPL CALC-SCNC: 14 MMOL/L
ANION GAP SERPL CALC-SCNC: 9 MMOL/L
BACTERIA UR CULT: NO GROWTH
BASOPHILS # BLD AUTO: 0.04 K/UL
BASOPHILS NFR BLD: 0.2 %
BUN SERPL-MCNC: 36 MG/DL
BUN SERPL-MCNC: 38 MG/DL
BUN SERPL-MCNC: 40 MG/DL
CALCIUM SERPL-MCNC: 8.8 MG/DL
CALCIUM SERPL-MCNC: 9 MG/DL
CALCIUM SERPL-MCNC: 9 MG/DL
CALCIUM SERPL-MCNC: 9.1 MG/DL
CALCIUM SERPL-MCNC: 9.5 MG/DL
CHLORIDE SERPL-SCNC: 93 MMOL/L
CHLORIDE SERPL-SCNC: 97 MMOL/L
CHLORIDE SERPL-SCNC: 97 MMOL/L
CHLORIDE SERPL-SCNC: 98 MMOL/L
CHLORIDE SERPL-SCNC: 99 MMOL/L
CO2 SERPL-SCNC: 23 MMOL/L
CO2 SERPL-SCNC: 24 MMOL/L
CO2 SERPL-SCNC: 29 MMOL/L
CO2 SERPL-SCNC: 29 MMOL/L
CO2 SERPL-SCNC: 34 MMOL/L
CREAT SERPL-MCNC: 1.4 MG/DL
CREAT SERPL-MCNC: 1.5 MG/DL
CREAT SERPL-MCNC: 1.5 MG/DL
CREAT SERPL-MCNC: 1.6 MG/DL
CREAT SERPL-MCNC: 1.6 MG/DL
DELSYS: ABNORMAL
DIASTOLIC DYSFUNCTION: NO
DIFFERENTIAL METHOD: ABNORMAL
EOSINOPHIL # BLD AUTO: 0 K/UL
EOSINOPHIL NFR BLD: 0.1 %
ERYTHROCYTE [DISTWIDTH] IN BLOOD BY AUTOMATED COUNT: 13.4 %
EST. GFR  (AFRICAN AMERICAN): 52.6 ML/MIN/1.73 M^2
EST. GFR  (AFRICAN AMERICAN): 52.6 ML/MIN/1.73 M^2
EST. GFR  (AFRICAN AMERICAN): 56.9 ML/MIN/1.73 M^2
EST. GFR  (AFRICAN AMERICAN): 56.9 ML/MIN/1.73 M^2
EST. GFR  (AFRICAN AMERICAN): >60 ML/MIN/1.73 M^2
EST. GFR  (NON AFRICAN AMERICAN): 45.5 ML/MIN/1.73 M^2
EST. GFR  (NON AFRICAN AMERICAN): 45.5 ML/MIN/1.73 M^2
EST. GFR  (NON AFRICAN AMERICAN): 49.2 ML/MIN/1.73 M^2
EST. GFR  (NON AFRICAN AMERICAN): 49.2 ML/MIN/1.73 M^2
EST. GFR  (NON AFRICAN AMERICAN): 53.5 ML/MIN/1.73 M^2
FERRITIN SERPL-MCNC: 408 NG/ML
FIO2: 45
FLOW: 15
GLUCOSE SERPL-MCNC: 103 MG/DL
GLUCOSE SERPL-MCNC: 116 MG/DL
GLUCOSE SERPL-MCNC: 133 MG/DL
GLUCOSE SERPL-MCNC: 133 MG/DL
GLUCOSE SERPL-MCNC: 165 MG/DL
HCO3 UR-SCNC: 26.6 MMOL/L (ref 24–28)
HCT VFR BLD AUTO: 27.5 %
HGB BLD-MCNC: 8.9 G/DL
IMM GRANULOCYTES # BLD AUTO: 0.25 K/UL
IMM GRANULOCYTES NFR BLD AUTO: 1.3 %
IRON SERPL-MCNC: 13 UG/DL
LYMPHOCYTES # BLD AUTO: 0.5 K/UL
LYMPHOCYTES NFR BLD: 2.7 %
MAGNESIUM SERPL-MCNC: 1.5 MG/DL
MAGNESIUM SERPL-MCNC: 1.7 MG/DL
MCH RBC QN AUTO: 30.6 PG
MCHC RBC AUTO-ENTMCNC: 32.4 G/DL
MCV RBC AUTO: 95 FL
MODE: ABNORMAL
MONOCYTES # BLD AUTO: 1.4 K/UL
MONOCYTES NFR BLD: 7.1 %
NEUTROPHILS # BLD AUTO: 17.2 K/UL
NEUTROPHILS NFR BLD: 88.6 %
NRBC BLD-RTO: 0 /100 WBC
PCO2 BLDA: 37 MMHG (ref 35–45)
PH SMN: 7.46 [PH] (ref 7.35–7.45)
PHOSPHATE SERPL-MCNC: 1.9 MG/DL
PLATELET # BLD AUTO: 283 K/UL
PMV BLD AUTO: 8.6 FL
PO2 BLDA: 52 MMHG (ref 80–100)
POC BE: 3 MMOL/L
POC SATURATED O2: 88 % (ref 95–100)
POC TCO2: 28 MMOL/L (ref 23–27)
POTASSIUM SERPL-SCNC: 3.3 MMOL/L
POTASSIUM SERPL-SCNC: 3.4 MMOL/L
POTASSIUM SERPL-SCNC: 3.4 MMOL/L
POTASSIUM SERPL-SCNC: 3.6 MMOL/L
POTASSIUM SERPL-SCNC: 4 MMOL/L
RBC # BLD AUTO: 2.91 M/UL
RETIRED EF AND QEF - SEE NOTES: 53 (ref 55–65)
SAMPLE: ABNORMAL
SATURATED IRON: 10 %
SITE: ABNORMAL
SODIUM SERPL-SCNC: 136 MMOL/L
SP02: 93
TOTAL IRON BINDING CAPACITY: 135 UG/DL
TRANSFERRIN SERPL-MCNC: 91 MG/DL
VANCOMYCIN TROUGH SERPL-MCNC: 25 UG/ML
WBC # BLD AUTO: 19.41 K/UL

## 2018-05-07 PROCEDURE — 20000000 HC ICU ROOM

## 2018-05-07 PROCEDURE — 99900035 HC TECH TIME PER 15 MIN (STAT)

## 2018-05-07 PROCEDURE — 63600175 PHARM REV CODE 636 W HCPCS

## 2018-05-07 PROCEDURE — 99291 CRITICAL CARE FIRST HOUR: CPT | Mod: ,,, | Performed by: INTERNAL MEDICINE

## 2018-05-07 PROCEDURE — 25000003 PHARM REV CODE 250: Performed by: STUDENT IN AN ORGANIZED HEALTH CARE EDUCATION/TRAINING PROGRAM

## 2018-05-07 PROCEDURE — 80048 BASIC METABOLIC PNL TOTAL CA: CPT

## 2018-05-07 PROCEDURE — 93307 TTE W/O DOPPLER COMPLETE: CPT | Mod: 26,,, | Performed by: INTERNAL MEDICINE

## 2018-05-07 PROCEDURE — 80048 BASIC METABOLIC PNL TOTAL CA: CPT | Mod: 91

## 2018-05-07 PROCEDURE — 63600175 PHARM REV CODE 636 W HCPCS: Performed by: INTERNAL MEDICINE

## 2018-05-07 PROCEDURE — 36600 WITHDRAWAL OF ARTERIAL BLOOD: CPT

## 2018-05-07 PROCEDURE — 84100 ASSAY OF PHOSPHORUS: CPT

## 2018-05-07 PROCEDURE — 25000003 PHARM REV CODE 250

## 2018-05-07 PROCEDURE — 82728 ASSAY OF FERRITIN: CPT

## 2018-05-07 PROCEDURE — 25000242 PHARM REV CODE 250 ALT 637 W/ HCPCS: Performed by: INTERNAL MEDICINE

## 2018-05-07 PROCEDURE — 63600175 PHARM REV CODE 636 W HCPCS: Performed by: STUDENT IN AN ORGANIZED HEALTH CARE EDUCATION/TRAINING PROGRAM

## 2018-05-07 PROCEDURE — 93010 ELECTROCARDIOGRAM REPORT: CPT | Mod: ,,, | Performed by: INTERNAL MEDICINE

## 2018-05-07 PROCEDURE — 94640 AIRWAY INHALATION TREATMENT: CPT

## 2018-05-07 PROCEDURE — 27100171 HC OXYGEN HIGH FLOW UP TO 24 HOURS

## 2018-05-07 PROCEDURE — 96374 THER/PROPH/DIAG INJ IV PUSH: CPT

## 2018-05-07 PROCEDURE — 25000242 PHARM REV CODE 250 ALT 637 W/ HCPCS: Performed by: STUDENT IN AN ORGANIZED HEALTH CARE EDUCATION/TRAINING PROGRAM

## 2018-05-07 PROCEDURE — 25000003 PHARM REV CODE 250: Performed by: INTERNAL MEDICINE

## 2018-05-07 PROCEDURE — 27000221 HC OXYGEN, UP TO 24 HOURS

## 2018-05-07 PROCEDURE — 80202 ASSAY OF VANCOMYCIN: CPT

## 2018-05-07 PROCEDURE — 83735 ASSAY OF MAGNESIUM: CPT | Mod: 91

## 2018-05-07 PROCEDURE — 36415 COLL VENOUS BLD VENIPUNCTURE: CPT

## 2018-05-07 PROCEDURE — 83540 ASSAY OF IRON: CPT

## 2018-05-07 PROCEDURE — 83735 ASSAY OF MAGNESIUM: CPT

## 2018-05-07 PROCEDURE — 85025 COMPLETE CBC W/AUTO DIFF WBC: CPT

## 2018-05-07 PROCEDURE — 82803 BLOOD GASES ANY COMBINATION: CPT

## 2018-05-07 RX ORDER — LORAZEPAM 2 MG/ML
1 INJECTION INTRAMUSCULAR EVERY 4 HOURS PRN
Status: DISCONTINUED | OUTPATIENT
Start: 2018-05-07 | End: 2018-05-10

## 2018-05-07 RX ORDER — GUAIFENESIN 100 MG/5ML
200 SOLUTION ORAL EVERY 4 HOURS
Status: DISCONTINUED | OUTPATIENT
Start: 2018-05-07 | End: 2018-05-07

## 2018-05-07 RX ORDER — LORAZEPAM 2 MG/ML
INJECTION INTRAMUSCULAR
Status: COMPLETED
Start: 2018-05-07 | End: 2018-05-07

## 2018-05-07 RX ORDER — POTASSIUM CHLORIDE 7.45 MG/ML
10 INJECTION INTRAVENOUS
Status: COMPLETED | OUTPATIENT
Start: 2018-05-07 | End: 2018-05-07

## 2018-05-07 RX ORDER — MAGNESIUM SULFATE HEPTAHYDRATE 40 MG/ML
2 INJECTION, SOLUTION INTRAVENOUS ONCE
Status: COMPLETED | OUTPATIENT
Start: 2018-05-07 | End: 2018-05-07

## 2018-05-07 RX ORDER — FUROSEMIDE 10 MG/ML
60 INJECTION INTRAMUSCULAR; INTRAVENOUS ONCE
Status: COMPLETED | OUTPATIENT
Start: 2018-05-07 | End: 2018-05-07

## 2018-05-07 RX ORDER — GUAIFENESIN 100 MG/5ML
200 SOLUTION ORAL EVERY 4 HOURS
Status: DISCONTINUED | OUTPATIENT
Start: 2018-05-07 | End: 2018-05-09

## 2018-05-07 RX ORDER — LORAZEPAM 0.5 MG/1
2 TABLET ORAL EVERY 4 HOURS PRN
Status: DISCONTINUED | OUTPATIENT
Start: 2018-05-07 | End: 2018-05-07

## 2018-05-07 RX ORDER — IPRATROPIUM BROMIDE AND ALBUTEROL SULFATE 2.5; .5 MG/3ML; MG/3ML
3 SOLUTION RESPIRATORY (INHALATION) ONCE
Status: COMPLETED | OUTPATIENT
Start: 2018-05-07 | End: 2018-05-07

## 2018-05-07 RX ORDER — FUROSEMIDE 10 MG/ML
60 INJECTION INTRAMUSCULAR; INTRAVENOUS 2 TIMES DAILY
Status: COMPLETED | OUTPATIENT
Start: 2018-05-07 | End: 2018-05-08

## 2018-05-07 RX ADMIN — ENOXAPARIN SODIUM 40 MG: 100 INJECTION SUBCUTANEOUS at 05:05

## 2018-05-07 RX ADMIN — LORAZEPAM 1 MG: 2 INJECTION INTRAMUSCULAR at 09:05

## 2018-05-07 RX ADMIN — FOLIC ACID 1 MG: 1 TABLET ORAL at 09:05

## 2018-05-07 RX ADMIN — VANCOMYCIN HYDROCHLORIDE 750 MG: 750 INJECTION, POWDER, LYOPHILIZED, FOR SOLUTION INTRAVENOUS at 05:05

## 2018-05-07 RX ADMIN — POTASSIUM PHOSPHATE, MONOBASIC AND POTASSIUM PHOSPHATE, DIBASIC 20 MMOL: 224; 236 INJECTION, SOLUTION, CONCENTRATE INTRAVENOUS at 05:05

## 2018-05-07 RX ADMIN — GUAIFENESIN 200 MG: 200 SOLUTION ORAL at 03:05

## 2018-05-07 RX ADMIN — FUROSEMIDE 60 MG: 10 INJECTION, SOLUTION INTRAVENOUS at 05:05

## 2018-05-07 RX ADMIN — IPRATROPIUM BROMIDE AND ALBUTEROL SULFATE 3 ML: .5; 3 SOLUTION RESPIRATORY (INHALATION) at 03:05

## 2018-05-07 RX ADMIN — IPRATROPIUM BROMIDE AND ALBUTEROL SULFATE 3 ML: .5; 3 SOLUTION RESPIRATORY (INHALATION) at 08:05

## 2018-05-07 RX ADMIN — PIPERACILLIN AND TAZOBACTAM 4.5 G: 4; .5 INJECTION, POWDER, LYOPHILIZED, FOR SOLUTION INTRAVENOUS; PARENTERAL at 12:05

## 2018-05-07 RX ADMIN — IPRATROPIUM BROMIDE AND ALBUTEROL SULFATE 3 ML: .5; 3 SOLUTION RESPIRATORY (INHALATION) at 02:05

## 2018-05-07 RX ADMIN — MAGNESIUM SULFATE IN WATER 2 G: 40 INJECTION, SOLUTION INTRAVENOUS at 03:05

## 2018-05-07 RX ADMIN — POTASSIUM CHLORIDE 10 MEQ: 7.46 INJECTION, SOLUTION INTRAVENOUS at 05:05

## 2018-05-07 RX ADMIN — THIAMINE HYDROCHLORIDE 100 MG: 100 INJECTION, SOLUTION INTRAMUSCULAR; INTRAVENOUS at 12:05

## 2018-05-07 RX ADMIN — IPRATROPIUM BROMIDE AND ALBUTEROL SULFATE 3 ML: .5; 3 SOLUTION RESPIRATORY (INHALATION) at 07:05

## 2018-05-07 RX ADMIN — DEXMEDETOMIDINE HYDROCHLORIDE 1 MCG/KG/HR: 100 INJECTION, SOLUTION, CONCENTRATE INTRAVENOUS at 11:05

## 2018-05-07 RX ADMIN — AZITHROMYCIN MONOHYDRATE 500 MG: 500 INJECTION, POWDER, LYOPHILIZED, FOR SOLUTION INTRAVENOUS at 07:05

## 2018-05-07 RX ADMIN — Medication 100 MG: at 09:05

## 2018-05-07 RX ADMIN — POTASSIUM CHLORIDE 10 MEQ: 7.46 INJECTION, SOLUTION INTRAVENOUS at 03:05

## 2018-05-07 RX ADMIN — POTASSIUM CHLORIDE 10 MEQ: 7.46 INJECTION, SOLUTION INTRAVENOUS at 06:05

## 2018-05-07 RX ADMIN — PIPERACILLIN AND TAZOBACTAM 4.5 G: 4; .5 INJECTION, POWDER, LYOPHILIZED, FOR SOLUTION INTRAVENOUS; PARENTERAL at 10:05

## 2018-05-07 RX ADMIN — GUAIFENESIN 200 MG: 200 SOLUTION ORAL at 07:05

## 2018-05-07 RX ADMIN — FOLIC ACID 1 MG: 5 INJECTION, SOLUTION INTRAMUSCULAR; INTRAVENOUS; SUBCUTANEOUS at 12:05

## 2018-05-07 RX ADMIN — PIPERACILLIN AND TAZOBACTAM 4.5 G: 4; .5 INJECTION, POWDER, LYOPHILIZED, FOR SOLUTION INTRAVENOUS; PARENTERAL at 09:05

## 2018-05-07 RX ADMIN — POTASSIUM CHLORIDE 10 MEQ: 7.46 INJECTION, SOLUTION INTRAVENOUS at 07:05

## 2018-05-07 RX ADMIN — FUROSEMIDE 60 MG: 10 INJECTION, SOLUTION INTRAVENOUS at 07:05

## 2018-05-07 NOTE — PROGRESS NOTES
[facility]  Critical Care Medicine  Progress Note    Patient Name: Ryder Boo  MRN: [id number]  Admission Date: 5/6/2018  Hospital Length of Stay: 1 days  Code Status: Full Code  Attending Provider: Uzair Jacobo MD  Primary Care Provider: Primary Doctor No   Principal Problem: <principal problem not specified>    Subjective:     HPI:  The patient is a 62 y/o M new to our system, with HTN, COPD and alcoholism, who is transfer from [facility] ICU [place] [facility] [place].    The patient lives with his brother across the street from his sister. He was found unresponsive in a chair with small amount of blood in his mouth by his nephew and taken to the hospital by EMS on 5/3. Patient drinks every day and has been having frequent falls recently. Per sister he has quit smoking 15 years ago.  In the ED at OSH he was found to be hypoxic and hypotensive, with undetectable EtOH levels, leukocytosis (24K) and hyponatremia (119) in the labs. he received narcan x1, IV fluids, started on metronidazole and ceftriaxone (also x1 of zosyn, azithromycin, vancomycin, ceftaroline) for aspiration pneumonia, and was put on 15L of Oxygen with non-rebreather, which improved his mental status and BP, however on 4/5 upon weaning off of oxygen he developed hypoxic hypercapnic respiratory failure and subsequent acidosis (pH 7.10), therefore he was intubated and transferred to the ICU. [place] [facility] [place] which per notes was started to maintain BP in the setting of hyponatremia.     Hospital/ICU Course:  5/6: upon arrival, patient was taken off of dopamine gtt and received 1.5L IV fluids to maintain MAPs. Continued on vanc/zosyn/azithromycin. Hyponatremia and ROHITH continued to improved with IVF. He was extubated in early afternoon and maintained normal O2 saturations on 3-4L oxygen on NC.   5/7: early morning he was noticed to be rattling while breathing by the nurse. Upon examination  he had increased work of breathing with worsening of bilateral rhonchi. Repeat CXR revealed interval worsening of the patchy infiltrations. Received x1 furosemide 60 mg IV and transitioned to high flow oxygen for possible development of ARDS, he is currently maintaining normal saturations, will keep on intubation watch.    No past medical history on file.    No past surgical history on file.    Review of patient's allergies indicates:  No Known Allergies    Family History     None        Social History Main Topics    Smoking status: Not on file    Smokeless tobacco: Not on file    Alcohol use Not on file    Drug use: Unknown    Sexual activity: Not on file      Review of Systems   Constitutional: Negative for chills and fever.   HENT: Negative for congestion, sore throat and trouble swallowing.    Respiratory: Positive for shortness of breath. Negative for chest tightness.    Cardiovascular: Positive for leg swelling. Negative for chest pain and palpitations.   Gastrointestinal: Negative for abdominal pain, constipation, diarrhea, nausea and vomiting.   Genitourinary: Negative for difficulty urinating, frequency and hematuria.   Musculoskeletal: Negative for arthralgias and myalgias.   Skin: Negative for rash and wound.   Neurological: Negative for tremors, seizures, weakness and headaches.   Psychiatric/Behavioral: Negative for agitation and confusion. The patient is not nervous/anxious.      Objective:     Vital Signs (Most Recent):  Temp: 98.5 °F (36.9 °C) (05/07/18 0700)  Pulse: (!) 113 (05/07/18 0715)  Resp: (!) 37 (05/07/18 0715)  BP: (!) 176/96 (05/07/18 0700)  SpO2: 98 % (05/07/18 0715) Vital Signs (24h Range):  Temp:  [98.3 °F (36.8 °C)-99.6 °F (37.6 °C)] 98.5 °F (36.9 °C)  Pulse:  [] 113  Resp:  [16-41] 37  SpO2:  [84 %-100 %] 98 %  BP: (106-176)/(59-96) 176/96   Weight: 85.1 kg (187 lb 9.8 oz)  There is no height or weight on file to calculate BMI.      Intake/Output Summary (Last 24 hours) at  05/07/18 0751  Last data filed at 05/07/18 0700   Gross per 24 hour   Intake             1359 ml   Output             1520 ml   Net             -161 ml       Physical Exam   Constitutional: He is oriented to person, place, and time. He appears well-developed and well-nourished. He is cooperative. No distress. Nasal cannula in place.   Pleasant white male with poor hygiene in mild distress   HENT:   Head: Normocephalic and atraumatic.   Mouth/Throat: Mucous membranes are dry.   Eyes: Conjunctivae and EOM are normal. Pupils are equal, round, and reactive to light.   Neck: Normal range of motion.   Cardiovascular: Regular rhythm and normal heart sounds.  Tachycardia present.  Exam reveals no gallop.    No murmur heard.  Pulses:       Radial pulses are 2+ on the right side, and 2+ on the left side.   Pulmonary/Chest: Accessory muscle usage present. No tachypnea. He is in respiratory distress. He has no decreased breath sounds. He has no wheezes. He has rhonchi (scattered (R>L)). He has no rales.   Abdominal: Soft. Bowel sounds are normal. He exhibits no distension. There is no tenderness. There is no rigidity, no rebound and no guarding.   Musculoskeletal: Normal range of motion. He exhibits edema (improved pitting edema on Bl LE). He exhibits no tenderness.   Neurological: He is alert and oriented to person, place, and time. He displays no tremor. He displays no seizure activity.   Skin: Skin is warm. Capillary refill takes less than 2 seconds. No rash noted. He is not diaphoretic. No cyanosis or erythema.   Psychiatric: He has a normal mood and affect. His speech is normal and behavior is normal.   Nursing note and vitals reviewed.      Vents:  Vent Mode: A/C (05/06/18 1307)  Ventilator Initiated: Yes (05/06/18 0045)  Set Rate: 14 bmp (05/06/18 1307)  Vt Set: 450 mL (05/06/18 1307)  PEEP/CPAP: 5 cmH20 (05/06/18 1307)  Oxygen Concentration (%): 40 (05/06/18 1307)  Peak Airway Pressure: 26 cmH2O (05/06/18 1307)  Total  Ve: 11 mL (05/06/18 1307)  F/VT Ratio<105 (RSBI): (!) 45.56 (05/06/18 1307)  Lines/Drains/Airways     Drain                 Urethral Catheter 05/06/18 0153 1 day          Pressure Ulcer                 Pressure Injury 05/06/18 0251 Left anterior Buttocks Stage 2 1 day          Peripheral Intravenous Line                 Peripheral IV - Single Lumen 05/05/18 Left Forearm 2 days         Peripheral IV - Single Lumen 05/06/18 0102 Right Forearm 1 day         Peripheral IV - Single Lumen 05/06/18 1948 Left Antecubital less than 1 day              Significant Labs:    CBC/Anemia Profile:    Recent Labs  Lab 05/06/18  0101 05/07/18  0335   WBC 20.44* 19.41*   HGB 8.6* 8.9*   HCT 27.4* 27.5*    283   MCV 97 95   RDW 13.2 13.4        Chemistries:    Recent Labs  Lab 05/06/18  0101  05/06/18  0858 05/06/18  1710 05/06/18  2126 05/07/18  0335   *  < > 134* 139 133* 136   K 4.1  < > 3.9 3.5 3.5 3.6   CL 94*  < > 99 99 98 99   CO2 28  < > 23 23 23 23   BUN 36*  < > 39* 42* 41* 40*   CREATININE 1.5*  < > 1.6* 1.6* 1.7* 1.6*   CALCIUM 8.7  < > 8.9 9.1 8.8 9.1   ALBUMIN 1.6*  --   --   --   --   --    PROT 5.9*  --   --   --   --   --    BILITOT 0.3  --   --   --   --   --    ALKPHOS 77  --   --   --   --   --    ALT 12  --   --   --   --   --    AST 21  --   --   --   --   --    MG 1.3*  --  1.9  --   --  1.7   PHOS 1.9*  --   --   --   --  1.9*   < > = values in this interval not displayed.    ABGs:     Recent Labs  Lab 05/06/18  1027   PH 7.470*   PCO2 34.9*   HCO3 25.4   POCSATURATED 85*   BE 2     Blood Culture:     Recent Labs  Lab 05/06/18  0057 05/06/18  0058   LABBLOO No Growth to date  No Growth to date Gram stain aer bottle: Gram positive cocci in clusters resembling Staph   Results called to and read back by: Humza Lantigua RN  05/07/2018  06:16     Coagulation:     Recent Labs  Lab 05/06/18  0101   INR 1.1   APTT 23.4     Lactic Acid:     Recent Labs  Lab 05/06/18  0101   LACTATE 1.6     Urine Culture: No  results for input(s): LABURIN in the last 48 hours.    Significant Imaging:   CXR 5/6: multifocal areas of consolidations with worsening of the R middle lobe infiltration compared to the previous imaging  CXR 5/7: interval worsening of the patchy infiltrations     Assessment/Plan:     Psychiatric   Alcoholism /alcohol abuse    In initial labs at OSH:  EtOH undetectable, urine tox negative     - on arrival not agitated, no evidence of withdrawal or hepatic encephalopathy on exam  - LFTs within normal limits  - started on thiamine and folic acid daily  - will monitor the electrolytes and replace as needed  - remains AAOx4        Pulmonary   Respiratory failure with hypoxia and hypercapnia    Former smoker with Hx of COPD, overlapped with volume overload due to acute decompensated HF  2+ pitting edema on lower extremities + bilateral rhonchi and wheezing on the exam    PFT on 5/31/2017 at OSH:    Moderate obstructive + mild restrictive pattern    FEV1/FVC 60%  2D echo on 2/2018 at OSH:    EF 55-60%   Normal LA pressure   mild TVR  BNP on 5/4: 349    - Repeat CXR: fibrotic changes in DIAMANTE + patchy infiltration specially in RML   - ECG this morning sinus tachycardia, Troponin normal, repeat   - extubated yesterday afternoon, however this morning with increased work of breathing => transitioned to high flow for possible development of ARDS  - will start diuresis with lasix 60 mg x1 and monitor UOP  - currently maintaining good oxygenation, intubation watch  - will require PT/OT evaluation            Pneumonia    At OSH:  - WBC elevated up to 38K  - CXR with bilateral multifocal infiltration (more pronounced in RML)  - Most likely due to aspiration secondary to AMS    On arrival to [facility]:  - afebrile, mildly tachypneic, with scattered rhonchi and wheezing on exam  - Repeat CXR: with interval worsening of infiltration/consolidation in RML  - repeat WBC 20K (90% PMN), procalcitonin -> 9.9  - obtained influenza Ag ->  negative    - covered broadly with vancomycin + zosyn + azithromycin  - continued on mechanical ventilation     5/7 update:  - extubated yesterday afternoon to 3-4L NC  - increased work of breathing and rhonchi on exam overnight  - interval worsening of patchy infiltrations on CXR suggestive of developing ARDS vs pulmonary edema due to IVF  - question of possible prior TB infection this morning, will send AFB on sputum  - RVP pending  - respiratory cultures -> rare GPC, 1/2 blood cx growing GCP resembling staph  - Legionella Ag -> pending  - will continue broad spectrum antibiotics   - will give lasix 60 mg IV x1 and monitor UOP  - will transition to high flow oxygen to provide PEEP  - will keep on intubation watch        Renal/   ROHITH (acute kidney injury)    Initial Cr at OSH elevated to 2.8 which trended down with IV fluids  Most likely due to poor PO intake and dehydration    - Repeat BMP upon arrival to [facility] 1.5  - will monitor UOP closely  - Cr remains stable ~1.6          Hyponatremia    Initial Na at  which improved to 129   Differentials include abnormal renal filtration due to ROHITH vs low intravascular volume due to decompensated HF vs GI loss    - Repeated BMP upon arrival to [facility] 132  - monitored closely with BMP q8 hr  - resolved, Na 136 this morning        ID   Sepsis    At OSH:  Lactic acid 4.1 -> 2.5 -> 1.6  WBC 24K -> 38K  U/a negative  CXR with evidence of RML consolidation  Upon arrival to [facility], afebrile, RR 28-33 on ventilator, BP 82/54  WBC 20, lactic acid 1.6, procal 9.9  qSOFA 1/3, SIRS 2/4    - received NS bolus 1.5L , MAPs>65, no need for pressors, remained afebrile  - 1/2 Blood cx growing GPC resembling staph, respiratory cx rare GPC  - CXR -> interval worsening of infiltrations  - will continue on broad spectrum abx and await speciation of cultures           Critical Care Daily Checklist:    A: Awake: RASS Goal/Actual Goal:    Actual: [place] Agitation Sedation Scale (RASS): Alert and  calm   B: Spontaneous Breathing Trial Performed? Spon. Breathing Trial Initiated?: Initiated (05/06/18 7099)   C: SAT & SBT Coordinated?  N/A                      D: Delirium: CAM-ICU     E: Early Mobility Performed? No   F: Feeding Goal:    Status:     Current Diet Order   Procedures    Diet clear liquid      AS: Analgesia/Sedation No   T: Thromboembolic Prophylaxis Lovenox   H: HOB > 300 Yes   U: Stress Ulcer Prophylaxis (if needed) No   G: Glucose Control No   B: Bowel Function Stool Occurrence: 0   I: Indwelling Catheter (Lines & Banda) Necessity Banda   D: De-escalation of Antimicrobials/Pharmacotherapies No    Plan for the day/ETD Intubation watch    Code Status:  Family/Goals of Care: Full Code         Critical secondary to Patient has a condition that poses threat to life and bodily function: Severe Respiratory Distress      Critical care was time spent personally by me on the following activities: development of treatment plan with patient or surrogate and bedside caregivers, discussions with consultants, evaluation of patient's response to treatment, examination of patient, ordering and performing treatments and interventions, ordering and review of laboratory studies, ordering and review of radiographic studies, pulse oximetry, re-evaluation of patient's condition. This critical care time did not overlap with that of any other provider or involve time for any procedures.     Fanny Carpio MD  Critical Care Medicine  [facility]

## 2018-05-07 NOTE — HOSPITAL COURSE
5/6: upon arrival, patient was taken off of dopamine gtt and received 1.5L IV fluids to maintain MAPs. Continued on vanc/zosyn/azithromycin. Hyponatremia and ROHITH continued to improved with IVF. He was extubated in early afternoon and maintained normal O2 saturations on 3-4L oxygen on NC.   5/7: early morning he was noticed to be rattling while breathing by the nurse. Upon examination he had increased work of breathing with worsening of bilateral rhonchi. Repeat CXR revealed interval worsening of the patchy infiltrations. Received x1 furosemide 60 mg IV and transitioned to high flow oxygen for possible development of ARDS, he is currently maintaining normal saturations, will keep on intubation watch.  5/8: several episodes of desaturation down to 70% overnight and early morning, that responded well to increasing oxygen to 100%, currently on 35L/50%, mildly drowsy due to precedex. Responded well to diuretics, net -2.8L. Will discontinue precedex and will have speech evaluate his swallowing.  5/9: Uneventful night off precedex. Remains confused, oriented to person and place. Oxygen requirements greatly improved 15L/50% with plan to wean to low-flow NC today. Diuresed -2.9L yesterday and discontinued lasix. Discontinued azithromycin and continued on zosyn. Failed speech eval by SLP and remains NPO.

## 2018-05-07 NOTE — PT/OT/SLP PROGRESS
Occupational Therapy      Patient Name:  Ryder Boo   MRN:  [id number]    Patient not seen today secondary to labile respirator status and RN having to increase sedation  . Will follow-up as appropriate.    Elly Woodard, PT  5/7/2018

## 2018-05-07 NOTE — ASSESSMENT & PLAN NOTE
Initial Na at  which improved to 129   Differentials include abnormal renal filtration due to ROHITH vs low intravascular volume due to decompensated HF vs GI loss    - Repeated BMP upon arrival to [facility] 132  - monitored closely with BMP q8 hr  - resolved, Na 136 this morning

## 2018-05-07 NOTE — ASSESSMENT & PLAN NOTE
Former smoker with Hx of COPD, overlapped with volume overload due to acute decompensated HF  2+ pitting edema on lower extremities + bilateral rhonchi and wheezing on the exam    PFT on 5/31/2017 at OSH:    Moderate obstructive + mild restrictive pattern    FEV1/FVC 60%  2D echo on 2/2018 at OSH:    EF 55-60%   Normal LA pressure   mild TVR  BNP on 5/4: 349    - Repeat CXR: fibrotic changes in DIAMANTE + patchy infiltration specially in RML   - ECG this morning sinus tachycardia, Troponin normal, repeat   - extubated yesterday afternoon, however this morning with increased work of breathing => transitioned to high flow for possible development of ARDS  - will start diuresis with lasix 60 mg x1 and monitor UOP  - currently maintaining good oxygenation, intubation watch  - will require PT/OT evaluation

## 2018-05-07 NOTE — PROGRESS NOTES
Pt remains disoriented to place, time, and situation. Precedex infusing to RASS. CT of chest done today. Lasix given IV for low urine output and diuresis. Pt remains on comfort flow nasal cannula. Pt is laying in bed. No distress noted. Will continue to monitor.

## 2018-05-07 NOTE — PROGRESS NOTES
"Notified Dr. Hoffmann of pt's difficulty breathing, rattling breath sounds, and low urine output. Pt also states, "I had TB years ago". New orders placed for comfort care oxygen delivery, lasix, guaifenesin and labs. Will care out orders and continue to monitor.   "

## 2018-05-07 NOTE — ASSESSMENT & PLAN NOTE
At OSH:  - WBC elevated up to 38K  - CXR with bilateral multifocal infiltration (more pronounced in RML)  - Most likely due to aspiration secondary to AMS    On arrival to [facility]:  - afebrile, mildly tachypneic, with scattered rhonchi and wheezing on exam  - Repeat CXR: with interval worsening of infiltration/consolidation in RML  - repeat WBC 20K (90% PMN), procalcitonin -> 9.9  - obtained influenza Ag -> negative    - covered broadly with vancomycin + zosyn + azithromycin  - continued on mechanical ventilation     5/7 update:  - extubated yesterday afternoon to 3-4L NC  - increased work of breathing and rhonchi on exam overnight  - interval worsening of patchy infiltrations on CXR suggestive of developing ARDS vs pulmonary edema due to IVF  - question of possible prior TB infection this morning, will send AFB on sputum  - RVP pending  - respiratory cultures -> rare GPC, 1/2 blood cx growing GCP resembling staph  - Legionella Ag -> pending  - will continue broad spectrum antibiotics   - will give lasix 60 mg IV x1 and monitor UOP  - will transition to high flow oxygen to provide PEEP  - will keep on intubation watch

## 2018-05-07 NOTE — PLAN OF CARE
Problem: Patient Care Overview  Goal: Plan of Care Review  Dx: Pneumonia   Hx: HTN, COPD and alcoholism, who is a transfer from [facility] ICU to [facility] on mechanical ventilation after being found unresponsive at home    4/5: Transferred to [facility] on mechanical ventilation. Dopamine gtt off.   4/6: Pt extubated         RN  MAP>65    *Pt likes [employer] channel 3      Outcome: Ongoing (interventions implemented as appropriate)  POC reviewed with patient who verbalized understanding. Pt tolerating diet well, no nausea/vomiting. Pt denies pain. Banda output has been adequate, see intake and output flow sheet. Keeping O2 saturation greater than 92, pt has been labile with O2 saturation, now currently on 3 L NC. Pt remained free from falls throughout the shift VSS. No distress noted, will continue to monitor.

## 2018-05-07 NOTE — PT/OT/SLP PROGRESS
Occupational Therapy      Patient Name:  Ryder Boo   MRN:  [id number]    Patient not seen today secondary to Other (Comment) (hold, labile respiratory status. Increased sedation this am.). Will follow-up.    FRANCA Bernstein  5/7/2018

## 2018-05-07 NOTE — ASSESSMENT & PLAN NOTE
In initial labs at OSH:  EtOH undetectable, urine tox negative     - on arrival not agitated, no evidence of withdrawal or hepatic encephalopathy on exam  - LFTs within normal limits  - started on thiamine and folic acid daily  - will monitor the electrolytes and replace as needed  - remains AAOx4

## 2018-05-07 NOTE — SUBJECTIVE & OBJECTIVE
No past medical history on file.    No past surgical history on file.    Review of patient's allergies indicates:  No Known Allergies    Family History     None        Social History Main Topics    Smoking status: Not on file    Smokeless tobacco: Not on file    Alcohol use Not on file    Drug use: Unknown    Sexual activity: Not on file      Review of Systems   Constitutional: Negative for chills and fever.   HENT: Negative for congestion, sore throat and trouble swallowing.    Respiratory: Positive for shortness of breath. Negative for chest tightness.    Cardiovascular: Positive for leg swelling. Negative for chest pain and palpitations.   Gastrointestinal: Negative for abdominal pain, constipation, diarrhea, nausea and vomiting.   Genitourinary: Negative for difficulty urinating, frequency and hematuria.   Musculoskeletal: Negative for arthralgias and myalgias.   Skin: Negative for rash and wound.   Neurological: Negative for tremors, seizures, weakness and headaches.   Psychiatric/Behavioral: Negative for agitation and confusion. The patient is not nervous/anxious.      Objective:     Vital Signs (Most Recent):  Temp: 98.5 °F (36.9 °C) (05/07/18 0700)  Pulse: (!) 113 (05/07/18 0715)  Resp: (!) 37 (05/07/18 0715)  BP: (!) 176/96 (05/07/18 0700)  SpO2: 98 % (05/07/18 0715) Vital Signs (24h Range):  Temp:  [98.3 °F (36.8 °C)-99.6 °F (37.6 °C)] 98.5 °F (36.9 °C)  Pulse:  [] 113  Resp:  [16-41] 37  SpO2:  [84 %-100 %] 98 %  BP: (106-176)/(59-96) 176/96   Weight: 85.1 kg (187 lb 9.8 oz)  There is no height or weight on file to calculate BMI.      Intake/Output Summary (Last 24 hours) at 05/07/18 0751  Last data filed at 05/07/18 0700   Gross per 24 hour   Intake             1359 ml   Output             1520 ml   Net             -161 ml       Physical Exam   Constitutional: He is oriented to person, place, and time. He appears well-developed and well-nourished. He is cooperative. No distress. Nasal cannula  in place.   Pleasant white male with poor hygiene in mild distress   HENT:   Head: Normocephalic and atraumatic.   Mouth/Throat: Mucous membranes are dry.   Eyes: Conjunctivae and EOM are normal. Pupils are equal, round, and reactive to light.   Neck: Normal range of motion.   Cardiovascular: Regular rhythm and normal heart sounds.  Tachycardia present.  Exam reveals no gallop.    No murmur heard.  Pulses:       Radial pulses are 2+ on the right side, and 2+ on the left side.   Pulmonary/Chest: Accessory muscle usage present. No tachypnea. He is in respiratory distress. He has no decreased breath sounds. He has no wheezes. He has rhonchi (scattered (R>L)). He has no rales.   Abdominal: Soft. Bowel sounds are normal. He exhibits no distension. There is no tenderness. There is no rigidity, no rebound and no guarding.   Musculoskeletal: Normal range of motion. He exhibits edema (improved pitting edema on Bl LE). He exhibits no tenderness.   Neurological: He is alert and oriented to person, place, and time. He displays no tremor. He displays no seizure activity.   Skin: Skin is warm. Capillary refill takes less than 2 seconds. No rash noted. He is not diaphoretic. No cyanosis or erythema.   Psychiatric: He has a normal mood and affect. His speech is normal and behavior is normal.   Nursing note and vitals reviewed.      Vents:  Vent Mode: A/C (05/06/18 1307)  Ventilator Initiated: Yes (05/06/18 0045)  Set Rate: 14 bmp (05/06/18 1307)  Vt Set: 450 mL (05/06/18 1307)  PEEP/CPAP: 5 cmH20 (05/06/18 1307)  Oxygen Concentration (%): 40 (05/06/18 1307)  Peak Airway Pressure: 26 cmH2O (05/06/18 1307)  Total Ve: 11 mL (05/06/18 1307)  F/VT Ratio<105 (RSBI): (!) 45.56 (05/06/18 1307)  Lines/Drains/Airways     Drain                 Urethral Catheter 05/06/18 0153 1 day          Pressure Ulcer                 Pressure Injury 05/06/18 0251 Left anterior Buttocks Stage 2 1 day          Peripheral Intravenous Line                  Peripheral IV - Single Lumen 05/05/18 Left Forearm 2 days         Peripheral IV - Single Lumen 05/06/18 0102 Right Forearm 1 day         Peripheral IV - Single Lumen 05/06/18 1948 Left Antecubital less than 1 day              Significant Labs:    CBC/Anemia Profile:    Recent Labs  Lab 05/06/18  0101 05/07/18  0335   WBC 20.44* 19.41*   HGB 8.6* 8.9*   HCT 27.4* 27.5*    283   MCV 97 95   RDW 13.2 13.4        Chemistries:    Recent Labs  Lab 05/06/18  0101  05/06/18  0858 05/06/18  1710 05/06/18  2126 05/07/18  0335   *  < > 134* 139 133* 136   K 4.1  < > 3.9 3.5 3.5 3.6   CL 94*  < > 99 99 98 99   CO2 28  < > 23 23 23 23   BUN 36*  < > 39* 42* 41* 40*   CREATININE 1.5*  < > 1.6* 1.6* 1.7* 1.6*   CALCIUM 8.7  < > 8.9 9.1 8.8 9.1   ALBUMIN 1.6*  --   --   --   --   --    PROT 5.9*  --   --   --   --   --    BILITOT 0.3  --   --   --   --   --    ALKPHOS 77  --   --   --   --   --    ALT 12  --   --   --   --   --    AST 21  --   --   --   --   --    MG 1.3*  --  1.9  --   --  1.7   PHOS 1.9*  --   --   --   --  1.9*   < > = values in this interval not displayed.    ABGs:     Recent Labs  Lab 05/06/18  1027   PH 7.470*   PCO2 34.9*   HCO3 25.4   POCSATURATED 85*   BE 2     Blood Culture:     Recent Labs  Lab 05/06/18  0057 05/06/18  0058   LABBLOO No Growth to date  No Growth to date Gram stain aer bottle: Gram positive cocci in clusters resembling Staph   Results called to and read back by: Humza Lantigua RN  05/07/2018  06:16     Coagulation:     Recent Labs  Lab 05/06/18  0101   INR 1.1   APTT 23.4     Lactic Acid:     Recent Labs  Lab 05/06/18  0101   LACTATE 1.6     Urine Culture: No results for input(s): LABURIN in the last 48 hours.    Significant Imaging:   CXR 5/6: multifocal areas of consolidations with worsening of the R middle lobe infiltration compared to the previous imaging  CXR 5/7: interval worsening of the patchy infiltrations

## 2018-05-07 NOTE — PLAN OF CARE
Payor: MEDICAID / Plan: [facility] COMMUNITY PLAN [facility] ([place] MEDICAID) / Product Type: Managed Medicaid /     No future appointments.     Extended Emergency Contact Information  Primary Emergency Contact: Sejal Starkey   [place]  Mobile Phone: [phone]  Relation: Sister        05/07/18 1126   Discharge Assessment   Assessment Type Discharge Planning Assessment   Confirmed/corrected address and phone number on facesheet? Yes   Assessment information obtained from? Patient   Communicated expected length of stay with patient/caregiver no   Prior to hospitilization cognitive status: Alert/Oriented   Prior to hospitalization functional status: Independent   Current cognitive status: Alert/Oriented   Current Functional Status: Needs Assistance   Facility Arrived From: [facility]   Lives With sibling(s)   Able to Return to Prior Arrangements unable to determine at this time (comments)   Is patient able to care for self after discharge? Unable to determine at this time (comments)   Who are your caregiver(s) and their phone number(s)? Sejal Starkey (Sister) [phone]    Patient's perception of discharge disposition home or selfcare   Readmission Within The Last 30 Days no previous admission in last 30 days   Patient currently being followed by outpatient case management? No   Patient currently receives any other outside agency services? No   Equipment Currently Used at Home none   Do you have any problems affording any of your prescribed medications? TBD   Is the patient taking medications as prescribed? (No home medications on file)   Does the patient have transportation home? Yes   Transportation Available family or friend will provide   Dialysis Name and Scheduled days N/A   Does the patient receive services at the [facility]? No   Discharge Plan A Home with family   Discharge Plan B Home   Patient/Family In Agreement With Plan yes   Vicki Saleem, RN, BSN, CCM  Case  Management  [facility]  Ext. [phone]

## 2018-05-07 NOTE — ASSESSMENT & PLAN NOTE
At OSH:  Lactic acid 4.1 -> 2.5 -> 1.6  WBC 24K -> 38K  U/a negative  CXR with evidence of RML consolidation  Upon arrival to [facility], afebrile, RR 28-33 on ventilator, BP 82/54  WBC 20, lactic acid 1.6, procal 9.9  qSOFA 1/3, SIRS 2/4    - received NS bolus 1.5L , MAPs>65, no need for pressors, remained afebrile  - 1/2 Blood cx growing GPC resembling staph, respiratory cx rare GPC  - CXR -> interval worsening of infiltrations  - will continue on broad spectrum abx and await speciation of cultures

## 2018-05-07 NOTE — ASSESSMENT & PLAN NOTE
Initial Cr at OSH elevated to 2.8 which trended down with IV fluids  Most likely due to poor PO intake and dehydration    - Repeat BMP upon arrival to C 1.5  - will monitor UOP closely  - Cr remains stable ~1.6

## 2018-05-08 PROBLEM — E87.1 HYPONATREMIA: Status: RESOLVED | Noted: 2018-05-06 | Resolved: 2018-05-08

## 2018-05-08 LAB
ALBUMIN SERPL BCP-MCNC: 1.7 G/DL
ALLENS TEST: ABNORMAL
ALP SERPL-CCNC: 66 U/L
ALT SERPL W/O P-5'-P-CCNC: 11 U/L
ANION GAP SERPL CALC-SCNC: 12 MMOL/L
ANION GAP SERPL CALC-SCNC: 13 MMOL/L
AST SERPL-CCNC: 17 U/L
BACTERIA SPEC AEROBE CULT: NORMAL
BASOPHILS # BLD AUTO: 0.03 K/UL
BASOPHILS NFR BLD: 0.3 %
BILIRUB SERPL-MCNC: 0.4 MG/DL
BUN SERPL-MCNC: 35 MG/DL
BUN SERPL-MCNC: 36 MG/DL
CALCIUM SERPL-MCNC: 9.3 MG/DL
CALCIUM SERPL-MCNC: 9.4 MG/DL
CHLORIDE SERPL-SCNC: 93 MMOL/L
CHLORIDE SERPL-SCNC: 94 MMOL/L
CO2 SERPL-SCNC: 33 MMOL/L
CO2 SERPL-SCNC: 36 MMOL/L
CREAT SERPL-MCNC: 1.3 MG/DL
CREAT SERPL-MCNC: 1.3 MG/DL
DELSYS: ABNORMAL
DIFFERENTIAL METHOD: ABNORMAL
EOSINOPHIL # BLD AUTO: 0.1 K/UL
EOSINOPHIL NFR BLD: 0.6 %
ERYTHROCYTE [DISTWIDTH] IN BLOOD BY AUTOMATED COUNT: 13.5 %
EST. GFR  (AFRICAN AMERICAN): >60 ML/MIN/1.73 M^2
EST. GFR  (AFRICAN AMERICAN): >60 ML/MIN/1.73 M^2
EST. GFR  (NON AFRICAN AMERICAN): 58.5 ML/MIN/1.73 M^2
EST. GFR  (NON AFRICAN AMERICAN): 58.5 ML/MIN/1.73 M^2
FIO2: 60
FLOW: 35
GLUCOSE SERPL-MCNC: 108 MG/DL
GLUCOSE SERPL-MCNC: 123 MG/DL
GRAM STN SPEC: NORMAL
HCO3 UR-SCNC: 37.5 MMOL/L (ref 24–28)
HCT VFR BLD AUTO: 29.8 %
HGB BLD-MCNC: 9.5 G/DL
IMM GRANULOCYTES # BLD AUTO: 0.15 K/UL
IMM GRANULOCYTES NFR BLD AUTO: 1.3 %
LYMPHOCYTES # BLD AUTO: 0.7 K/UL
LYMPHOCYTES NFR BLD: 6 %
MAGNESIUM SERPL-MCNC: 1.4 MG/DL
MCH RBC QN AUTO: 29.9 PG
MCHC RBC AUTO-ENTMCNC: 31.9 G/DL
MCV RBC AUTO: 94 FL
MODE: ABNORMAL
MONOCYTES # BLD AUTO: 1.2 K/UL
MONOCYTES NFR BLD: 11 %
NEUTROPHILS # BLD AUTO: 9 K/UL
NEUTROPHILS NFR BLD: 80.8 %
NRBC BLD-RTO: 0 /100 WBC
PCO2 BLDA: 59.8 MMHG (ref 35–45)
PH SMN: 7.41 [PH] (ref 7.35–7.45)
PHOSPHATE SERPL-MCNC: 4.5 MG/DL
PLATELET # BLD AUTO: 281 K/UL
PMV BLD AUTO: 8.6 FL
PO2 BLDA: 69 MMHG (ref 80–100)
POC BE: 13 MMOL/L
POC SATURATED O2: 93 % (ref 95–100)
POC TCO2: 39 MMOL/L (ref 23–27)
POTASSIUM SERPL-SCNC: 3.2 MMOL/L
POTASSIUM SERPL-SCNC: 3.4 MMOL/L
PROT SERPL-MCNC: 6.9 G/DL
PROVIDER CREDENTIALS: ABNORMAL
PROVIDER NOTIFIED: ABNORMAL
RBC # BLD AUTO: 3.18 M/UL
SAMPLE: ABNORMAL
SITE: ABNORMAL
SODIUM SERPL-SCNC: 139 MMOL/L
SODIUM SERPL-SCNC: 142 MMOL/L
SP02: 100
TIME NOTIFIED: 922
VANCOMYCIN TROUGH SERPL-MCNC: 26.4 UG/ML
VERBAL RESULT READBACK PERFORMED: YES
WBC # BLD AUTO: 11.19 K/UL

## 2018-05-08 PROCEDURE — 25000003 PHARM REV CODE 250: Performed by: STUDENT IN AN ORGANIZED HEALTH CARE EDUCATION/TRAINING PROGRAM

## 2018-05-08 PROCEDURE — 80053 COMPREHEN METABOLIC PANEL: CPT

## 2018-05-08 PROCEDURE — 82803 BLOOD GASES ANY COMBINATION: CPT

## 2018-05-08 PROCEDURE — 94761 N-INVAS EAR/PLS OXIMETRY MLT: CPT

## 2018-05-08 PROCEDURE — 97530 THERAPEUTIC ACTIVITIES: CPT

## 2018-05-08 PROCEDURE — 36600 WITHDRAWAL OF ARTERIAL BLOOD: CPT

## 2018-05-08 PROCEDURE — 92610 EVALUATE SWALLOWING FUNCTION: CPT

## 2018-05-08 PROCEDURE — 63600175 PHARM REV CODE 636 W HCPCS: Performed by: STUDENT IN AN ORGANIZED HEALTH CARE EDUCATION/TRAINING PROGRAM

## 2018-05-08 PROCEDURE — 99900035 HC TECH TIME PER 15 MIN (STAT)

## 2018-05-08 PROCEDURE — 63600175 PHARM REV CODE 636 W HCPCS: Performed by: INTERNAL MEDICINE

## 2018-05-08 PROCEDURE — 97166 OT EVAL MOD COMPLEX 45 MIN: CPT

## 2018-05-08 PROCEDURE — 97535 SELF CARE MNGMENT TRAINING: CPT

## 2018-05-08 PROCEDURE — 94640 AIRWAY INHALATION TREATMENT: CPT

## 2018-05-08 PROCEDURE — 25000003 PHARM REV CODE 250: Performed by: INTERNAL MEDICINE

## 2018-05-08 PROCEDURE — 25000003 PHARM REV CODE 250: Performed by: HOSPITALIST

## 2018-05-08 PROCEDURE — 27100092 HC HIGH FLOW DELIVERY CANNULA

## 2018-05-08 PROCEDURE — 80048 BASIC METABOLIC PNL TOTAL CA: CPT

## 2018-05-08 PROCEDURE — 25000242 PHARM REV CODE 250 ALT 637 W/ HCPCS: Performed by: INTERNAL MEDICINE

## 2018-05-08 PROCEDURE — 84100 ASSAY OF PHOSPHORUS: CPT

## 2018-05-08 PROCEDURE — 86480 TB TEST CELL IMMUN MEASURE: CPT

## 2018-05-08 PROCEDURE — 97163 PT EVAL HIGH COMPLEX 45 MIN: CPT

## 2018-05-08 PROCEDURE — 99233 SBSQ HOSP IP/OBS HIGH 50: CPT | Mod: ,,, | Performed by: INTERNAL MEDICINE

## 2018-05-08 PROCEDURE — 63600175 PHARM REV CODE 636 W HCPCS: Performed by: HOSPITALIST

## 2018-05-08 PROCEDURE — 83735 ASSAY OF MAGNESIUM: CPT

## 2018-05-08 PROCEDURE — 85025 COMPLETE CBC W/AUTO DIFF WBC: CPT

## 2018-05-08 PROCEDURE — 27100171 HC OXYGEN HIGH FLOW UP TO 24 HOURS

## 2018-05-08 PROCEDURE — 80202 ASSAY OF VANCOMYCIN: CPT

## 2018-05-08 PROCEDURE — 20000000 HC ICU ROOM

## 2018-05-08 RX ORDER — POTASSIUM CHLORIDE 7.45 MG/ML
10 INJECTION INTRAVENOUS
Status: COMPLETED | OUTPATIENT
Start: 2018-05-08 | End: 2018-05-08

## 2018-05-08 RX ORDER — MAGNESIUM SULFATE HEPTAHYDRATE 40 MG/ML
2 INJECTION, SOLUTION INTRAVENOUS ONCE
Status: COMPLETED | OUTPATIENT
Start: 2018-05-08 | End: 2018-05-08

## 2018-05-08 RX ORDER — MAGNESIUM SULFATE HEPTAHYDRATE 40 MG/ML
2 INJECTION, SOLUTION INTRAVENOUS
Status: DISCONTINUED | OUTPATIENT
Start: 2018-05-08 | End: 2018-05-08

## 2018-05-08 RX ADMIN — POTASSIUM CHLORIDE 10 MEQ: 7.46 INJECTION, SOLUTION INTRAVENOUS at 10:05

## 2018-05-08 RX ADMIN — DEXMEDETOMIDINE HYDROCHLORIDE 0.3 MCG/KG/HR: 100 INJECTION, SOLUTION, CONCENTRATE INTRAVENOUS at 06:05

## 2018-05-08 RX ADMIN — POTASSIUM CHLORIDE 10 MEQ: 7.46 INJECTION, SOLUTION INTRAVENOUS at 07:05

## 2018-05-08 RX ADMIN — PIPERACILLIN AND TAZOBACTAM 4.5 G: 4; .5 INJECTION, POWDER, LYOPHILIZED, FOR SOLUTION INTRAVENOUS; PARENTERAL at 04:05

## 2018-05-08 RX ADMIN — IPRATROPIUM BROMIDE AND ALBUTEROL SULFATE 3 ML: .5; 3 SOLUTION RESPIRATORY (INHALATION) at 08:05

## 2018-05-08 RX ADMIN — AZITHROMYCIN MONOHYDRATE 500 MG: 500 INJECTION, POWDER, LYOPHILIZED, FOR SOLUTION INTRAVENOUS at 06:05

## 2018-05-08 RX ADMIN — POTASSIUM CHLORIDE 10 MEQ: 7.46 INJECTION, SOLUTION INTRAVENOUS at 05:05

## 2018-05-08 RX ADMIN — PIPERACILLIN AND TAZOBACTAM 4.5 G: 4; .5 INJECTION, POWDER, LYOPHILIZED, FOR SOLUTION INTRAVENOUS; PARENTERAL at 08:05

## 2018-05-08 RX ADMIN — PIPERACILLIN AND TAZOBACTAM 4.5 G: 4; .5 INJECTION, POWDER, LYOPHILIZED, FOR SOLUTION INTRAVENOUS; PARENTERAL at 12:05

## 2018-05-08 RX ADMIN — FOLIC ACID 1 MG: 5 INJECTION, SOLUTION INTRAMUSCULAR; INTRAVENOUS; SUBCUTANEOUS at 10:05

## 2018-05-08 RX ADMIN — FUROSEMIDE 60 MG: 10 INJECTION, SOLUTION INTRAVENOUS at 06:05

## 2018-05-08 RX ADMIN — MAGNESIUM SULFATE IN WATER 2 G: 40 INJECTION, SOLUTION INTRAVENOUS at 05:05

## 2018-05-08 RX ADMIN — THIAMINE HYDROCHLORIDE 100 MG: 100 INJECTION, SOLUTION INTRAMUSCULAR; INTRAVENOUS at 08:05

## 2018-05-08 RX ADMIN — ENOXAPARIN SODIUM 40 MG: 100 INJECTION SUBCUTANEOUS at 05:05

## 2018-05-08 RX ADMIN — IPRATROPIUM BROMIDE AND ALBUTEROL SULFATE 3 ML: .5; 3 SOLUTION RESPIRATORY (INHALATION) at 02:05

## 2018-05-08 RX ADMIN — IPRATROPIUM BROMIDE AND ALBUTEROL SULFATE 3 ML: .5; 3 SOLUTION RESPIRATORY (INHALATION) at 07:05

## 2018-05-08 RX ADMIN — POTASSIUM CHLORIDE 10 MEQ: 7.46 INJECTION, SOLUTION INTRAVENOUS at 06:05

## 2018-05-08 RX ADMIN — POTASSIUM PHOSPHATE, MONOBASIC AND POTASSIUM PHOSPHATE, DIBASIC 20 MMOL: 224; 236 INJECTION, SOLUTION, CONCENTRATE INTRAVENOUS at 09:05

## 2018-05-08 RX ADMIN — FUROSEMIDE 60 MG: 10 INJECTION, SOLUTION INTRAVENOUS at 08:05

## 2018-05-08 NOTE — NURSING
Patient O2 sats decreasing to low 90's. Pt increased abdominal muscle use. Increased comfort flow from 40% to 60%. Sats now mid 90's. Notified MD. No orders placed at this time. Will continue to monitor.

## 2018-05-08 NOTE — ASSESSMENT & PLAN NOTE
At OSH:  - WBC elevated up to 38K  - CXR with bilateral multifocal infiltration (more pronounced in RML)  - Most likely due to aspiration secondary to AMS    On arrival to C:  - afebrile, mildly tachypneic, with scattered rhonchi and wheezing on exam  - Repeat CXR: with interval worsening of infiltration/consolidation in RML  - repeat WBC 20K (90% PMN), procalcitonin -> 9.9  - obtained influenza Ag -> negative    - covered broadly with vancomycin + zosyn + azithromycin  - continued on mechanical ventilation   - extubated on 5/6  to 3-4L NC, increased work of breathing and rhonchi on exam overnight  - interval worsening of patchy infiltrations on CXR suggestive of developing ARDS vs pulmonary edema due to IVF  - patient mentioning Hx of TB in 2001 which was treated, confirmed with the sister, sputum sent for AFB  - respiratory cultures -> rare GPC, 1/2 blood cx growing GCP resembling staph    5/8:  - AFB pending  - Legionella Ag -> pending  - will continue broad spectrum antibiotics, will hold vanc today due to high vanc trough   - has received lasix 60 mg IV x3 so far, net -2.8L over the last 24 hr, will continue diuresis  - currently on 35L/50% comfort flow, will try to wean down oxygen  - will keep on intubation watch

## 2018-05-08 NOTE — ASSESSMENT & PLAN NOTE
Initial Cr at OSH elevated to 2.8 which trended down with IV fluids  Most likely due to poor PO intake and dehydration    - Repeat BMP upon arrival to C 1.5  - currently resolved, Cr~1.3

## 2018-05-08 NOTE — PLAN OF CARE
Problem: Occupational Therapy Goal  Goal: Occupational Therapy Goal  Goals to be met by: 5/22/18     Patient will increase functional independence with ADLs by performing:    Feeding with Supervision.  UE Dressing with [place] and Minimal Assistance.  LE Dressing with [place] and Moderate Assistance.  Grooming while standing with Contact Guard Assistance.  Toileting from bedside commode with Moderate Assistance for hygiene and clothing management.   Toilet transfer to bedside commode with Minimal Assistance .    Outcome: Ongoing (interventions implemented as appropriate)  OT yoli completed, and above goals established. FRANCA Sevilla  5/8/2018      Comments: OT eval completed, and above goals established. FRANCA Sevilla  5/8/2018

## 2018-05-08 NOTE — PLAN OF CARE
Problem: SLP Goal  Goal: SLP Goal  Swallow eval completed. Pt should remain npo with strict aspiration precautions.     SHAUN West, CCC-SLP  5/8/2018

## 2018-05-08 NOTE — PHYSICIAN QUERY
"PT Name: Ryder Boo  MR #: [id number]    Physician Query Form - Heart  Condition Clarification     CDS/: Maria Isabel Coleman RN CDI     Contact information:  [email or web address]  This form is a permanent document in the medical record.     Query Date: May 8, 2018    By submitting this query, we are merely seeking further clarification of documentation. Please utilize your independent clinical judgment when addressing the question(s) below.    The medical record contains the following   Indicators     Supporting Clinical Findings Location in Medical Record   X  5/6   Lab results   X EF 50-55%   Echo results 5/7    X Radiology findings Adequate radiographic positioning of endotracheal tube.  Multifocal areas of consolidation throughout the lungs, nonspecific, but may represent pneumonia or other alveolar process.  Recommend continued follow-up to exclude underlying mass.   5/6   X Echo Results   1 - Low normal to mildly depressed left ventricular systolic function (EF 50-55%).     2 - Wall motion abnormalities.     3 - Normal left ventricular diastolic function.     4 - Right ventricle is upper limit of normal in size with low normal to mildly depressed systolic function.     5 - Moderately enlarged ascending aorta.     6 - No evidence of intracardiac shunt.    Echo results 5/7    "Ascites" documented      "SOB" or "KAPLAN" documented     X "Hypoxia" documented Respiratory failure with hypoxia  63 year old man with ethanol abuse, mild COPD and chronic left upper lobe fibrosis with pleural rxn found down by family.  Progressive hypoxemic and hypercapnic respiratory failure with hypotension.  Intubated, no low dose pressor and transferred for higher level of care   H&P   X Heart Failure documented  Former smoker with Hx of COPD, overlapped with volume overload due to acute decompensated HF  2+ pitting edema on lower extremities + bilateral rhonchi and wheezing on the exam     PFT on 5/31/2017 at OSH:               " "Moderate obstructive + mild restrictive pattern                FEV1/FVC 60%  2D echo on 2/2018 at OSH:                      EF 55-60%              Normal LA pressure              mild TVR  BNP on 5/4: 349     - Repeat CXR: fibrotic changes in DIAMANTE + patchy infiltration specially in RML   H&P    "Edema" documented He exhibits edema (2+ pitting, Bl LE).   5/6 625 am   X Diuretics/Meds Furosemide 60 mg IV 5/7 5617   Medication sheets    Treatment:      Other:          Provider, please specify diagnosis or diagnoses associated with above clinical findings.                               [  ] Acute Systolic Heart Failure ( EF < 40)*    [  ] Acute on Chronic Systolic Heart Failure ( EF < 40)*    [  ] Chronic Systolic Heart Failure (EF < 40)*    [  ] Other (please specify): ___________________________________    [ x ] Clinically Undetermined                *American Heart Association                                                                                                      Please document in your progress notes daily for the duration of treatment until resolved and include in your discharge summary.    "

## 2018-05-08 NOTE — PT/OT/SLP EVAL
Physical Therapy Evaluation    Patient Name:  Ryder Boo   MRN:  [id number]  Co-treat with OT    Recommendations:     Discharge Recommendations:  nursing facility, skilled   Discharge Equipment Recommendations:  (TBD)   Barriers to discharge: None    Assessment:     Ryder Boo is a 63 y.o. male admitted with a medical diagnosis of PNA.  He presents with the following impairments/functional limitations:  weakness, impaired endurance, impaired self care skills, impaired functional mobilty, gait instability, impaired balance, impaired cognition, decreased safety awareness, impaired cardiopulmonary response to activity.     Rehab Prognosis:  good; patient would benefit from acute skilled PT services to address these deficits and reach maximum level of function.      Recent Surgery: * No surgery found *      Plan:     During this hospitalization, patient to be seen 4 x/week to address the above listed problems via gait training, therapeutic activities, therapeutic exercises, neuromuscular re-education  · Plan of Care Expires:  06/06/18   Plan of Care Reviewed with: patient    Subjective     Communicated with RN prior to session.  Patient found in bed upon PT entry to room, agreeable to evaluation.      Chief Complaint: unable to assess 2nd to pt confused   Patient comments/goals: unable to assess   Pain/Comfort:  · Pain Rating 1: 0/10  · Pain Rating Post-Intervention 1: 0/10    Patients cultural, spiritual, Evangelical conflicts given the current situation: none reported     Living Environment:  Pt a questionable historian. Pt lives with brother in [facility] with no ANDRES to enter.  Prior to admission, patients level of function was indep with ambulation and ADL's.  Patient has the following equipment: none.  DME owned (not currently used): none.  Upon discharge, patient will have assistance from brother.    Objective:     Patient found with: telemetry, pulse ox (continuous), blood pressure cuff, oxygen, peripheral IV,  hess catheter     General Precautions: Standard, fall   Orthopedic Precautions:N/A   Braces: N/A     Exams:  · Cognitive Exam:  Patient is oriented to Person and follows commands but with increased processing time; pt present confused in conversation at times  · RLE ROM: WFL  · RLE Strength: grossly 3+/5  · LLE ROM: WFL  · LLE Strength: grossly 3+/5    Functional Mobility:  · Bed Mobility:     · Supine to Sit: minimum assistance  · Sit to Supine: total assistance  · Transfers:     · Sit to Stand:  total assistance with no AD x 3 trials from EOB  · Max verbal and tactile cues for technique and upright posture   · Gait: not performed     AM-PAC 6 CLICK MOBILITY  Total Score:9       Therapeutic Activities and Exercises:  Educated pt on PT role/POC  Pt would benefit from further education    Sitting EOB x 10 minutes with max A to increase tolerance to OOB activity and to create optimal positioning for lung expansion    Pt with bowel movement found when sitting EOB. Assist OT with kristine cleaning. OT to change sheets.     Patient left HOB elevated with all lines intact, call button in reach and RN notified.    GOALS:    Physical Therapy Goals        Problem: Physical Therapy Goal    Goal Priority Disciplines Outcome Goal Variances Interventions   Physical Therapy Goal     PT/OT, PT Ongoing (interventions implemented as appropriate)     Description:  Goals to be met by: 2018    Patient will increase functional independence with mobility by performin. Supine to sit with Moderate Assistance - not met  2. Sit to supine with Moderate Assistance - not met  3. Sit to stand transfer with Moderate Assistance using LRAD or no AD - not met  4. Bed to chair transfer with Moderate Assistance using LRAD or no AD - not met  5. Gait  x 20 feet with Moderate Assistance using LRAD or no AD - not met  6. Lower extremity exercise program x20 reps per handout, with supervision - not met                      History:     No past  medical history on file.    No past surgical history on file.      Time Tracking:     PT Received On: 05/08/18  PT Start Time: 1015     PT Stop Time: 1040  PT Total Time (min): 25 min     Billable Minutes: Evaluation 10 and Therapeutic Activity 8    Dona Gilliam, PT, DPT  5/8/2018  [phone]

## 2018-05-08 NOTE — SUBJECTIVE & OBJECTIVE
No past medical history on file.    No past surgical history on file.    Review of patient's allergies indicates:  No Known Allergies    Family History     None        Social History Main Topics    Smoking status: Not on file    Smokeless tobacco: Not on file    Alcohol use Not on file    Drug use: Unknown    Sexual activity: Not on file      Review of Systems   Constitutional: Negative for chills and fever.   HENT: Positive for trouble swallowing. Negative for congestion and sore throat.    Respiratory: Negative for chest tightness and shortness of breath.    Cardiovascular: Negative for chest pain, palpitations and leg swelling.   Gastrointestinal: Negative for abdominal pain, constipation, diarrhea, nausea and vomiting.   Genitourinary: Negative for difficulty urinating, frequency and hematuria.   Musculoskeletal: Negative for arthralgias and myalgias.   Skin: Negative for rash and wound.   Neurological: Negative for tremors, seizures, weakness and headaches.   Psychiatric/Behavioral: Negative for agitation and confusion. The patient is not nervous/anxious.      Objective:     Vital Signs (Most Recent):  Temp: 98.4 °F (36.9 °C) (05/08/18 1115)  Pulse: 93 (05/08/18 1415)  Resp: (!) 29 (05/08/18 1415)  BP: (!) 142/78 (05/08/18 1400)  SpO2: (!) 94 % (05/08/18 1415) Vital Signs (24h Range):  Temp:  [97.1 °F (36.2 °C)-98.8 °F (37.1 °C)] 98.4 °F (36.9 °C)  Pulse:  [] 93  Resp:  [24-64] 29  SpO2:  [87 %-100 %] 94 %  BP: (109-155)/(65-97) 142/78   Weight: 84.2 kg (185 lb 10 oz)  Body mass index is 29.96 kg/m².      Intake/Output Summary (Last 24 hours) at 05/08/18 1431  Last data filed at 05/08/18 1400   Gross per 24 hour   Intake           3511.2 ml   Output             4815 ml   Net          -1303.8 ml       Physical Exam   Constitutional: He is oriented to person, place, and time. He appears well-developed and well-nourished. He is cooperative. He appears ill. No distress. Nasal cannula in place.   Mildly  drowsy   HENT:   Head: Normocephalic and atraumatic.   Mouth/Throat: Mucous membranes are dry.   Eyes: Conjunctivae and EOM are normal. Pupils are equal, round, and reactive to light.   Neck: Normal range of motion.   Cardiovascular: Regular rhythm and normal heart sounds.  Tachycardia present.  Exam reveals no gallop.    No murmur heard.  Pulses:       Radial pulses are 2+ on the right side, and 2+ on the left side.   Pulmonary/Chest: Accessory muscle usage present. No tachypnea. He is in respiratory distress. He has no decreased breath sounds. He has no wheezes. He has rhonchi (scattered (R>L)). He has no rales.   Abdominal: Soft. Bowel sounds are normal. He exhibits no distension. There is no tenderness. There is no rigidity, no rebound and no guarding.   Musculoskeletal: Normal range of motion. He exhibits no edema or tenderness.   Neurological: He is oriented to person, place, and time. He displays no tremor. He displays no seizure activity.   Skin: Skin is warm. Capillary refill takes less than 2 seconds. No rash noted. He is not diaphoretic. No cyanosis or erythema.   Psychiatric: He has a normal mood and affect. His speech is normal and behavior is normal.   Nursing note and vitals reviewed.      Vents:  Vent Mode: A/C (05/06/18 1307)  Ventilator Initiated: Yes (05/06/18 0045)  Set Rate: 14 bmp (05/06/18 1307)  Vt Set: 450 mL (05/06/18 1307)  PEEP/CPAP: 5 cmH20 (05/06/18 1307)  Oxygen Concentration (%): 50 (05/08/18 1159)  Peak Airway Pressure: 26 cmH2O (05/06/18 1307)  Total Ve: 11 mL (05/06/18 1307)  F/VT Ratio<105 (RSBI): (!) 45.56 (05/06/18 1307)  Lines/Drains/Airways     Drain                 Urethral Catheter 05/06/18 0153 2 days          Pressure Ulcer                 Pressure Injury 05/06/18 0251 Left anterior Buttocks Stage 2 2 days          Peripheral Intravenous Line                 Peripheral IV - Single Lumen 05/07/18 1100 Left Forearm 1 day         Peripheral IV - Single Lumen 05/07/18 Left  Hand 1 day         Peripheral IV - Single Lumen 05/07/18 Right Forearm 1 day              Significant Labs:    CBC/Anemia Profile:    Recent Labs  Lab 05/07/18  0335 05/07/18  1037 05/08/18  0400   WBC 19.41*  --  11.19   HGB 8.9*  --  9.5*   HCT 27.5*  --  29.8*     --  281   MCV 95  --  94   RDW 13.4  --  13.5   IRON  --  13*  --    FERRITIN  --  408*  --         Chemistries:    Recent Labs  Lab 05/07/18  0335 05/07/18  1037 05/07/18  1602 05/07/18  2245 05/08/18  0400    136 136  136 136 139   K 3.6 3.3* 3.4*  3.4* 4.0 3.2*   CL 99 98 97  97 93* 93*   CO2 23 24 29  29 34* 33*   BUN 40* 38* 38*  38* 36* 36*   CREATININE 1.6* 1.6* 1.5*  1.5* 1.4 1.3   CALCIUM 9.1 9.5 9.0  9.0 8.8 9.4   ALBUMIN  --   --   --   --  1.7*   PROT  --   --   --   --  6.9   BILITOT  --   --   --   --  0.4   ALKPHOS  --   --   --   --  66   ALT  --   --   --   --  11   AST  --   --   --   --  17   MG 1.7 1.5*  --   --  1.4*   PHOS 1.9*  --   --   --  4.5       ABGs:     Recent Labs  Lab 05/08/18  0913   PH 7.406   PCO2 59.8*   HCO3 37.5*   POCSATURATED 93*   BE 13     Blood Culture:   No results for input(s): LABBLOO in the last 48 hours.  Coagulation:   No results for input(s): PT, INR, APTT in the last 48 hours.  Lactic Acid:   No results for input(s): LACTATE in the last 48 hours.  Urine Culture: No results for input(s): LABURIN in the last 48 hours.    Significant Imaging:   CXR 5/6: multifocal areas of consolidations with worsening of the R middle lobe infiltration compared to the previous imaging  CXR 5/7: interval worsening of the patchy infiltrations

## 2018-05-08 NOTE — NURSING
Patient alert, oriented to self, following commands. MAP remained >65. Oxygen titrated to maintain sats. Pt remained free from falls/injuries. Skin assessed. No new injuries noted. Electrolytes replaced this AM. Remained on precedex. Will continue to monitor.

## 2018-05-08 NOTE — PLAN OF CARE
Problem: Physical Therapy Goal  Goal: Physical Therapy Goal  Goals to be met by: 2018    Patient will increase functional independence with mobility by performin. Supine to sit with Moderate Assistance - not met  2. Sit to supine with Moderate Assistance - not met  3. Sit to stand transfer with Moderate Assistance using LRAD or no AD - not met  4. Bed to chair transfer with Moderate Assistance using LRAD or no AD - not met  5. Gait  x 20 feet with Moderate Assistance using LRAD or no AD - not met  6. Lower extremity exercise program x20 reps per handout, with supervision - not met    Outcome: Ongoing (interventions implemented as appropriate)  Eval completed and goals appropriate.

## 2018-05-08 NOTE — PROGRESS NOTES
[facility]  Critical Care Medicine  Progress Note    Patient Name: yRder Boo  MRN: [id number]  Admission Date: 5/6/2018  Hospital Length of Stay: 2 days  Code Status: Full Code  Attending Provider: Johana Cary MD  Primary Care Provider: Primary Doctor No   Principal Problem: <principal problem not specified>    Subjective:     HPI:  The patient is a 64 y/o M new to our system, with HTN, COPD and alcoholism, who is transfer from [facility] ICU to [facility] on mechanical ventilation.    The patient lives with his brother across the street from his sister. He was found unresponsive in a chair with small amount of blood in his mouth by his nephew and taken to the hospital by EMS on 5/3. Patient drinks every day and has been having frequent falls recently. Per sister he has quit smoking 15 years ago.  In the ED at OSH he was found to be hypoxic and hypotensive, with undetectable EtOH levels, leukocytosis (24K) and hyponatremia (119) in the labs. he received narcan x1, IV fluids, started on metronidazole and ceftriaxone (also x1 of zosyn, azithromycin, vancomycin, ceftaroline) for aspiration pneumonia, and was put on 15L of Oxygen with non-rebreather, which improved his mental status and BP, however on 4/5 upon weaning off of oxygen he developed hypoxic hypercapnic respiratory failure and subsequent acidosis (pH 7.10), therefore he was intubated and transferred to the ICU. Patient arrived to [facility] on dopamine gtt which per notes was started to maintain BP in the setting of hyponatremia.     Hospital/ICU Course:  5/6: upon arrival, patient was taken off of dopamine gtt and received 1.5L IV fluids to maintain MAPs. Continued on vanc/zosyn/azithromycin. Hyponatremia and ROHITH continued to improved with IVF. He was extubated in early afternoon and maintained normal O2 saturations on 3-4L oxygen on NC.   5/7: early morning he was noticed to be rattling while breathing by the nurse. Upon examination he had  increased work of breathing with worsening of bilateral rhonchi. Repeat CXR revealed interval worsening of the patchy infiltrations. Received x1 furosemide 60 mg IV and transitioned to high flow oxygen for possible development of ARDS, he is currently maintaining normal saturations, will keep on intubation watch.    Interval History:  5/8: several episodes of desaturation down to 70% overnight and early morning, that responded well to increasing oxygen to 100%, currently on 35L/50%, mildly drowsy due to precedex. Responded well to diuretics, net -2.8L. Will discontinue precedex and will have speech evaluate his swallowing.    No past medical history on file.    No past surgical history on file.    Review of patient's allergies indicates:  No Known Allergies    Family History     None        Social History Main Topics    Smoking status: Not on file    Smokeless tobacco: Not on file    Alcohol use Not on file    Drug use: Unknown    Sexual activity: Not on file      Review of Systems   Constitutional: Negative for chills and fever.   HENT: Positive for trouble swallowing. Negative for congestion and sore throat.    Respiratory: Negative for chest tightness and shortness of breath.    Cardiovascular: Negative for chest pain, palpitations and leg swelling.   Gastrointestinal: Negative for abdominal pain, constipation, diarrhea, nausea and vomiting.   Genitourinary: Negative for difficulty urinating, frequency and hematuria.   Musculoskeletal: Negative for arthralgias and myalgias.   Skin: Negative for rash and wound.   Neurological: Negative for tremors, seizures, weakness and headaches.   Psychiatric/Behavioral: Negative for agitation and confusion. The patient is not nervous/anxious.      Objective:     Vital Signs (Most Recent):  Temp: 98.4 °F (36.9 °C) (05/08/18 1115)  Pulse: 93 (05/08/18 1415)  Resp: (!) 29 (05/08/18 1415)  BP: (!) 142/78 (05/08/18 1400)  SpO2: (!) 94 % (05/08/18 1415) Vital Signs (24h  Range):  Temp:  [97.1 °F (36.2 °C)-98.8 °F (37.1 °C)] 98.4 °F (36.9 °C)  Pulse:  [] 93  Resp:  [24-64] 29  SpO2:  [87 %-100 %] 94 %  BP: (109-155)/(65-97) 142/78   Weight: 84.2 kg (185 lb 10 oz)  Body mass index is 29.96 kg/m².      Intake/Output Summary (Last 24 hours) at 05/08/18 1431  Last data filed at 05/08/18 1400   Gross per 24 hour   Intake           3511.2 ml   Output             4815 ml   Net          -1303.8 ml       Physical Exam   Constitutional: He is oriented to person, place, and time. He appears well-developed and well-nourished. He is cooperative. He appears ill. No distress. Nasal cannula in place.   Mildly drowsy   HENT:   Head: Normocephalic and atraumatic.   Mouth/Throat: Mucous membranes are dry.   Eyes: Conjunctivae and EOM are normal. Pupils are equal, round, and reactive to light.   Neck: Normal range of motion.   Cardiovascular: Regular rhythm and normal heart sounds.  Tachycardia present.  Exam reveals no gallop.    No murmur heard.  Pulses:       Radial pulses are 2+ on the right side, and 2+ on the left side.   Pulmonary/Chest: Accessory muscle usage present. No tachypnea. He is in respiratory distress. He has no decreased breath sounds. He has no wheezes. He has rhonchi (scattered (R>L)). He has no rales.   Abdominal: Soft. Bowel sounds are normal. He exhibits no distension. There is no tenderness. There is no rigidity, no rebound and no guarding.   Musculoskeletal: Normal range of motion. He exhibits no edema or tenderness.   Neurological: He is oriented to person, place, and time. He displays no tremor. He displays no seizure activity.   Skin: Skin is warm. Capillary refill takes less than 2 seconds. No rash noted. He is not diaphoretic. No cyanosis or erythema.   Psychiatric: He has a normal mood and affect. His speech is normal and behavior is normal.   Nursing note and vitals reviewed.      Vents:  Vent Mode: A/C (05/06/18 1307)  Ventilator Initiated: Yes (05/06/18  0045)  Set Rate: 14 bmp (05/06/18 1307)  Vt Set: 450 mL (05/06/18 1307)  PEEP/CPAP: 5 cmH20 (05/06/18 1307)  Oxygen Concentration (%): 50 (05/08/18 1159)  Peak Airway Pressure: 26 cmH2O (05/06/18 1307)  Total Ve: 11 mL (05/06/18 1307)  F/VT Ratio<105 (RSBI): (!) 45.56 (05/06/18 1307)  Lines/Drains/Airways     Drain                 Urethral Catheter 05/06/18 0153 2 days          Pressure Ulcer                 Pressure Injury 05/06/18 0251 Left anterior Buttocks Stage 2 2 days          Peripheral Intravenous Line                 Peripheral IV - Single Lumen 05/07/18 1100 Left Forearm 1 day         Peripheral IV - Single Lumen 05/07/18 Left Hand 1 day         Peripheral IV - Single Lumen 05/07/18 Right Forearm 1 day              Significant Labs:    CBC/Anemia Profile:    Recent Labs  Lab 05/07/18  0335 05/07/18  1037 05/08/18  0400   WBC 19.41*  --  11.19   HGB 8.9*  --  9.5*   HCT 27.5*  --  29.8*     --  281   MCV 95  --  94   RDW 13.4  --  13.5   IRON  --  13*  --    FERRITIN  --  408*  --         Chemistries:    Recent Labs  Lab 05/07/18  0335 05/07/18  1037 05/07/18  1602 05/07/18  2245 05/08/18  0400    136 136  136 136 139   K 3.6 3.3* 3.4*  3.4* 4.0 3.2*   CL 99 98 97  97 93* 93*   CO2 23 24 29  29 34* 33*   BUN 40* 38* 38*  38* 36* 36*   CREATININE 1.6* 1.6* 1.5*  1.5* 1.4 1.3   CALCIUM 9.1 9.5 9.0  9.0 8.8 9.4   ALBUMIN  --   --   --   --  1.7*   PROT  --   --   --   --  6.9   BILITOT  --   --   --   --  0.4   ALKPHOS  --   --   --   --  66   ALT  --   --   --   --  11   AST  --   --   --   --  17   MG 1.7 1.5*  --   --  1.4*   PHOS 1.9*  --   --   --  4.5       ABGs:     Recent Labs  Lab 05/08/18  0913   PH 7.406   PCO2 59.8*   HCO3 37.5*   POCSATURATED 93*   BE 13     Blood Culture:   No results for input(s): LABBLOO in the last 48 hours.  Coagulation:   No results for input(s): PT, INR, APTT in the last 48 hours.  Lactic Acid:   No results for input(s): LACTATE in the last 48  hours.  Urine Culture: No results for input(s): LABURIN in the last 48 hours.    Significant Imaging:   CXR 5/6: multifocal areas of consolidations with worsening of the R middle lobe infiltration compared to the previous imaging  CXR 5/7: interval worsening of the patchy infiltrations     Assessment/Plan:     Psychiatric   Alcoholism /alcohol abuse    In initial labs at OSH:  - EtOH undetectable, urine tox negative   on arrival to [facility]:  - not agitated, no evidence of withdrawal or hepatic encephalopathy on exam  - LFTs within normal limits  - started on thiamine and folic acid daily  - started on precedex on 5/7 due to HTN/hallucination, will discontinue today and monitor CIWA  - will monitor the electrolytes and replace as needed  - will have SLP evaluation and start a diet if passes        Pulmonary   Respiratory failure with hypoxia and hypercapnia    Former smoker with Hx of COPD, overlapped with volume overload due to acute decompensated HF  2+ pitting edema on lower extremities + bilateral rhonchi and wheezing on the exam    PFT on 5/31/2017 at OSH:    Moderate obstructive + mild restrictive pattern    FEV1/FVC 60%  2D echo on 2/2018 at OSH:    EF 55-60%   Normal LA pressure   mild TVR  BNP on 5/4: 349    - Repeat CXR: fibrotic changes in DIAMANTE + patchy infiltration specially in RML   - ECG this morning sinus tachycardia, Troponin normal, repeat   - extubated on 5/6 -> increased work of breathing overnight => transitioned to high flow for possible development of ARDS  - on diuresis with lasix 60 mg x3 so far, with good response  - on ABG this morning pH WNL, will try to wean down  - PT/OT evaluation            Pneumonia    At OSH:  - WBC elevated up to 38K  - CXR with bilateral multifocal infiltration (more pronounced in RML)  - Most likely due to aspiration secondary to AMS    On arrival to [facility]:  - afebrile, mildly tachypneic, with scattered rhonchi and wheezing on exam  - Repeat CXR: with interval  worsening of infiltration/consolidation in RML  - repeat WBC 20K (90% PMN), procalcitonin -> 9.9  - obtained influenza Ag -> negative    - covered broadly with vancomycin + zosyn + azithromycin  - continued on mechanical ventilation   - extubated on 5/6  to 3-4L NC, increased work of breathing and rhonchi on exam overnight  - interval worsening of patchy infiltrations on CXR suggestive of developing ARDS vs pulmonary edema due to IVF  - patient mentioning Hx of TB in 2001 which was treated, confirmed with the sister, sputum sent for AFB  - respiratory cultures -> rare GPC, 1/2 blood cx growing GCP resembling staph    5/8:  - AFB pending  - Legionella Ag -> pending  - will continue broad spectrum antibiotics, will hold vanc today due to high vanc trough   - has received lasix 60 mg IV x3 so far, net -2.8L over the last 24 hr, will continue diuresis  - currently on 35L/50% comfort flow, will try to wean down oxygen  - will keep on intubation watch        Renal/   ROHITH (acute kidney injury)    Initial Cr at OSH elevated to 2.8 which trended down with IV fluids  Most likely due to poor PO intake and dehydration    - Repeat BMP upon arrival to [facility] 1.5  - currently resolved, Cr~1.3        ID   Sepsis    At OSH:  - Lactic acid 4.1 -> 2.5 -> 1.6  - WBC 24K -> 38K   - U/a negative  - CXR with evidence of RML consolidation  Upon arrival to [facility]  - afebrile, RR 28-33 on ventilator, BP 82/54  - WBC 20, lactic acid 1.6, procal 9.9  - qSOFA 1/3, SIRS 2/4  - received NS bolus 1.5L , MAPs>65, no need for pressors, remained afebrile  - 1/2 Blood cx growing GPC resembling staph, respiratory cx rare GPC  - CXR -> interval worsening of infiltrations    5/8:  - BP and HR WNL, afebrile  - WBC down to 11K  - will continue on azithromycin + zosyn, will hold vancomycin today for supra-therapeutic trough  - will continue on broad spectrum abx and await speciation of cultures           Critical Care Daily Checklist:    A: Awake: RASS  Goal/Actual Goal:    Actual: [place] Agitation Sedation Scale (RASS): Drowsy   B: Spontaneous Breathing Trial Performed? Spon. Breathing Trial Initiated?: Initiated (05/06/18 1930)   C: SAT & SBT Coordinated?  N/A                      D: Delirium: CAM-ICU     E: Early Mobility Performed? No   F: Feeding Goal:    Status:     Current Diet Order   Procedures    Diet NPO      AS: Analgesia/Sedation No   T: Thromboembolic Prophylaxis lovenox   H: HOB > 300 Yes   U: Stress Ulcer Prophylaxis (if needed) No   G: Glucose Control No   B: Bowel Function Stool Occurrence: 0   I: Indwelling Catheter (Lines & Banda) Necessity Urethral cath   D: De-escalation of Antimicrobials/Pharmacotherapies No    Plan for the day/ETD D/C precedex. waen down oxygen    Code Status:  Family/Goals of Care: Full Code         Critical secondary to Patient has a condition that poses threat to life and bodily function: Severe Respiratory Distress      Critical care was time spent personally by me on the following activities: development of treatment plan with patient or surrogate and bedside caregivers, discussions with consultants, evaluation of patient's response to treatment, examination of patient, ordering and performing treatments and interventions, ordering and review of laboratory studies, ordering and review of radiographic studies, pulse oximetry, re-evaluation of patient's condition. This critical care time did not overlap with that of any other provider or involve time for any procedures.     Fanny Carpio MD  Critical Care Medicine  [facility]

## 2018-05-08 NOTE — ASSESSMENT & PLAN NOTE
In initial labs at OSH:  - EtOH undetectable, urine tox negative   on arrival to [facility]:  - not agitated, no evidence of withdrawal or hepatic encephalopathy on exam  - LFTs within normal limits  - started on thiamine and folic acid daily  - started on precedex on 5/7 due to HTN/hallucination, will discontinue today and monitor CIWA  - will monitor the electrolytes and replace as needed  - will have SLP evaluation and start a diet if passes

## 2018-05-08 NOTE — ASSESSMENT & PLAN NOTE
Former smoker with Hx of COPD, overlapped with volume overload due to acute decompensated HF  2+ pitting edema on lower extremities + bilateral rhonchi and wheezing on the exam    PFT on 5/31/2017 at OSH:    Moderate obstructive + mild restrictive pattern    FEV1/FVC 60%  2D echo on 2/2018 at OSH:    EF 55-60%   Normal LA pressure   mild TVR  BNP on 5/4: 349    - Repeat CXR: fibrotic changes in DIAMANTE + patchy infiltration specially in RML   - ECG this morning sinus tachycardia, Troponin normal, repeat   - extubated on 5/6 -> increased work of breathing overnight => transitioned to high flow for possible development of ARDS  - on diuresis with lasix 60 mg x3 so far, with good response  - on ABG this morning pH WNL, will try to wean down  - PT/OT evaluation

## 2018-05-08 NOTE — ASSESSMENT & PLAN NOTE
At OSH:  - Lactic acid 4.1 -> 2.5 -> 1.6  - WBC 24K -> 38K   - U/a negative  - CXR with evidence of RML consolidation  Upon arrival to [facility]  - afebrile, RR 28-33 on ventilator, BP 82/54  - WBC 20, lactic acid 1.6, procal 9.9  - qSOFA 1/3, SIRS 2/4  - received NS bolus 1.5L , MAPs>65, no need for pressors, remained afebrile  - 1/2 Blood cx growing GPC resembling staph, respiratory cx rare GPC  - CXR -> interval worsening of infiltrations    5/8:  - BP and HR WNL, afebrile  - WBC down to 11K  - will continue on azithromycin + zosyn, will hold vancomycin today for supra-therapeutic trough  - will continue on broad spectrum abx and await speciation of cultures

## 2018-05-08 NOTE — PT/OT/SLP EVAL
"Speech Language Pathology Evaluation  Bedside Swallow    Patient Name:  Ryder Boo   MRN:  [id number]  Admitting Diagnosis: <principal problem not specified>    Recommendations:                 General Recommendations:  Dysphagia therapy  Diet recommendations:  NPO, NPO   Aspiration Precautions: Strict aspiration precautions   General Precautions: Standard, aspiration  Communication strategies:  none    History:     No past medical history on file.    No past surgical history on file.     Prior diet: regular/thin\  Pt arrived on 5/5 intubated and extubated 5/6    Subjective     " Can I have some ice?"  Patient goals: to eat and drink    Pain/Comfort:  · Pain Rating 1: 0/10  · Pain Rating Post-Intervention 1: 0/10    Objective:     Oral Musculature Evaluation  · Oral Musculature: WFL  · Dentition: present and adequate  · Mucosal Quality: adequate  · Mandibular Strength and Mobility: WFL  · Oral Labial Strength and Mobility: WFL  · Lingual Strength and Mobility: WFL  · Buccal Strength and Mobility: WFL  · Volitional Cough: weak  · Voice Prior to PO Intake: hoarse; decreased intensity    Bedside Swallow Eval:   Consistencies Assessed:  · Thin liquids teaspoon  · Puree teaspoon x2     Oral Phase:   · WFL    Pharyngeal Phase:   · coughing/choking  · multiple spontaneous swallows  · wet vocal quality after swallow    Compensatory Strategies  · None    Treatment: white board updated. Pt on comfort flow oxygen. Pt with decreased vocal intensity. Pt with immediate cough response following tsp of water. Pt with a cough response noted following both teaspoons of pureed thick water. Pt required multiple swallows. Reviewed s/s of aspiration and risk for aspiration. Pt should remain npo at this time.     Assessment:     Ryder Boo is a 63 y.o. male with an SLP diagnosis of Dysphagia and Dysphonia.  He presents with high risk for aspiration with all consistencies.    Goals:    SLP Goals        Problem: SLP Goal    Goal " Priority Disciplines Outcome   SLP Goal     SLP    Description:  Goals to be met 5/15  Pt will participate in ongoing swallow eval                     Plan:     · Patient to be seen:  5 x/week   · Plan of Care expires:     · Plan of Care reviewed with:  patient   · SLP Follow-Up:  Yes       Discharge recommendations:  other (see comments) (tbd)       Time Tracking:     SLP Treatment Date:   05/08/18  Speech Start Time:  1250  Speech Stop Time:  1301     Speech Total Time (min):  11 min    Billable Minutes: Eval Swallow and Oral Function 11    SHAUN West, CCC-SLP  05/08/2018

## 2018-05-08 NOTE — PT/OT/SLP EVAL
"Occupational Therapy   Evaluation    Name: Ryder Boo  MRN: [id number]  Admitting Diagnosis:  Respiratory failure; found unresponsive      Recommendations:     Discharge Recommendations: nursing facility, skilled  Discharge Equipment Recommendations:   (TBD closer to d/c)  Barriers to discharge:  Decreased caregiver support (at current functional level)    History:     Occupational Profile:  Living Environment: lives with brother and nephew in [facility] with no ANDRES  Previous level of function: (I) with ADL and ambulation; no DME  Roles and Routines: does not work or drive  Equipment Owned:  none  Assistance upon Discharge: brother is home during the day and can assist    No past medical history on file.    No past surgical history on file.    Subjective     Chief Complaint: "I didn't know I pooed."  Patient/Family stated goals: to get better  Communicated with: RN prior to session.  Pain/Comfort:  · Pain Rating 1: 0/10  · Pain Rating Post-Intervention 1: 0/10    Patients cultural, spiritual, Uatsdin conflicts given the current situation: none reported    Objective:     Patient found with: pulse ox (continuous), telemetry, hess catheter, peripheral IV, oxygen (Comfort Flow)    General Precautions: Standard, fall   Orthopedic Precautions:    Braces:       Occupational Performance:    Bed Mobility:    · Patient completed Rolling/Turning to Left with  minimum assistance  · Patient completed Scooting/Bridging with minimum assistance  · Patient completed Supine to Sit with minimum assistance  · Patient completed Sit to Supine with total assistance    Functional Mobility/Transfers:  · Patient completed Sit <> Stand Transfer with total assistance  with  no assistive device  from EOB x 3 trials with minimal to ~50% buttocks clearance  · Functional Mobility: NT    Activities of Daily Living:  · Feeding:  nt NPO  · Grooming: maximal assistance washing face seated EOB  · UB Dressing: total assistance    · LB Dressing: total " "assistance    · Toileting: total assistance      Cognitive/Visual Perceptual:  Pt is alert and follows one step commands, but exhibits generalized confusion and decreased safety awareness    Physical Exam:  Postural examination/scapula alignment:    -       No postural abnormalities identified  Sensation:    -       Intact  Upper Extremity Range of Motion:     -       Right Upper Extremity: AAROM WFL  -       Left Upper Extremity: AAROM WFL  Upper Extremity Strength:    -       Right Upper Extremity: 3-/5  -       Left Upper Extremity: 3-/5   Strength:    -       Right Upper Extremity: Fair  -       Left Upper Extremity: Fair  Fine Motor Coordination:    -       Impaired  Gross motor coordination:   WFL    Patient left supine with all lines intact, call button in reach and RN notified    [facility] 6 Click:  [facility] Total Score: 7    Treatment & Education:  Pt ed on OT POC  Pt sat EOB with max A for balance  Pt stood at EOB x three trials for pericare after BM  Education:    Assessment:     Ryder Boo is a 63 y.o. male with a medical diagnosis of hypoxic/hypercapneic respiratory failure.  Performance deficits affecting function are weakness, impaired functional mobilty, gait instability, impaired endurance, impaired balance, impaired self care skills, impaired cognition, decreased safety awareness, impaired cardiopulmonary response to activity, impaired coordination.      Rehab Prognosis:  Fair; patient would benefit from acute skilled OT services to address these deficits and reach maximum level of function.         Clinical Decision Makin.  OT Mod:  "Pt evaluation falls under moderate complexity for evaluation coding due to identification of 3-5 performance deficits noted as stated above. Eval required Min/Mod assistance to complete on this date and detailed assessment(s) were utilized. Moreover, an expanded review of history and occupational profile obtained with additional review of cognitive, physical " "and psychosocial hx."     Plan:     Patient to be seen 4 x/week to address the above listed problems via self-care/home management, therapeutic activities, therapeutic exercises, cognitive retraining  · Plan of Care Expires: 06/07/18  · Plan of Care Reviewed with: patient    This Plan of care has been discussed with the patient who was involved in its development and understands and is in agreement with the identified goals and treatment plan    GOALS:    Occupational Therapy Goals        Problem: Occupational Therapy Goal    Goal Priority Disciplines Outcome Interventions   Occupational Therapy Goal     OT, PT/OT Ongoing (interventions implemented as appropriate)    Description:  Goals to be met by: 5/22/18     Patient will increase functional independence with ADLs by performing:    Feeding with Supervision.  UE Dressing with [place] and Minimal Assistance.  LE Dressing with [place] and Moderate Assistance.  Grooming while standing with Contact Guard Assistance.  Toileting from bedside commode with Moderate Assistance for hygiene and clothing management.   Toilet transfer to bedside commode with Minimal Assistance .                      Time Tracking:     OT Date of Treatment: 05/08/18  OT Start Time: 1014  OT Stop Time: 1040  OT Total Time (min): 26 min    Billable Minutes:Evaluation 10  Self Care/Home Management 15    FRANCA Sevilla  5/8/2018    "

## 2018-05-09 PROBLEM — J96.01 ACUTE RESPIRATORY FAILURE WITH HYPOXIA AND HYPERCAPNIA: Status: ACTIVE | Noted: 2018-05-09

## 2018-05-09 PROBLEM — J69.0 ASPIRATION PNEUMONIA: Status: ACTIVE | Noted: 2018-05-05

## 2018-05-09 PROBLEM — J96.92 RESPIRATORY FAILURE WITH HYPOXIA AND HYPERCAPNIA: Status: ACTIVE | Noted: 2018-05-09

## 2018-05-09 PROBLEM — K92.1 MELENA: Status: ACTIVE | Noted: 2018-05-09

## 2018-05-09 PROBLEM — J96.02 ACUTE RESPIRATORY FAILURE WITH HYPOXIA AND HYPERCAPNIA: Status: ACTIVE | Noted: 2018-05-09

## 2018-05-09 PROBLEM — J96.22 ACUTE ON CHRONIC RESPIRATORY FAILURE WITH HYPOXIA AND HYPERCAPNIA: Status: ACTIVE | Noted: 2018-05-09

## 2018-05-09 PROBLEM — J96.21 ACUTE ON CHRONIC RESPIRATORY FAILURE WITH HYPOXIA AND HYPERCAPNIA: Status: ACTIVE | Noted: 2018-05-09

## 2018-05-09 PROBLEM — J96.91 RESPIRATORY FAILURE WITH HYPOXIA AND HYPERCAPNIA: Status: ACTIVE | Noted: 2018-05-09

## 2018-05-09 PROBLEM — J84.10 FIBROSIS OF LUNG: Status: ACTIVE | Noted: 2018-05-09

## 2018-05-09 LAB
ALBUMIN SERPL BCP-MCNC: 1.7 G/DL
ALP SERPL-CCNC: 57 U/L
ALT SERPL W/O P-5'-P-CCNC: 8 U/L
ANION GAP SERPL CALC-SCNC: 11 MMOL/L
AST SERPL-CCNC: 15 U/L
BACTERIA BLD CULT: NORMAL
BASOPHILS # BLD AUTO: 0.01 K/UL
BASOPHILS NFR BLD: 0.1 %
BILIRUB SERPL-MCNC: 0.4 MG/DL
BUN SERPL-MCNC: 34 MG/DL
CALCIUM SERPL-MCNC: 8.8 MG/DL
CHLORIDE SERPL-SCNC: 89 MMOL/L
CO2 SERPL-SCNC: 42 MMOL/L
CREAT SERPL-MCNC: 1.3 MG/DL
DIFFERENTIAL METHOD: ABNORMAL
EOSINOPHIL # BLD AUTO: 0.1 K/UL
EOSINOPHIL NFR BLD: 0.7 %
ERYTHROCYTE [DISTWIDTH] IN BLOOD BY AUTOMATED COUNT: 13.5 %
EST. GFR  (AFRICAN AMERICAN): >60 ML/MIN/1.73 M^2
EST. GFR  (NON AFRICAN AMERICAN): 58.5 ML/MIN/1.73 M^2
GLUCOSE SERPL-MCNC: 142 MG/DL
HCT VFR BLD AUTO: 28.1 %
HGB BLD-MCNC: 9.3 G/DL
IMM GRANULOCYTES # BLD AUTO: 0.16 K/UL
IMM GRANULOCYTES NFR BLD AUTO: 1.7 %
L PNEUMO AG UR QL IA: NOT DETECTED
LYMPHOCYTES # BLD AUTO: 0.9 K/UL
LYMPHOCYTES NFR BLD: 8.9 %
MAGNESIUM SERPL-MCNC: 1.4 MG/DL
MCH RBC QN AUTO: 30.7 PG
MCHC RBC AUTO-ENTMCNC: 33.1 G/DL
MCV RBC AUTO: 93 FL
MONOCYTES # BLD AUTO: 1 K/UL
MONOCYTES NFR BLD: 10.8 %
NEUTROPHILS # BLD AUTO: 7.5 K/UL
NEUTROPHILS NFR BLD: 77.8 %
NRBC BLD-RTO: 0 /100 WBC
PLATELET # BLD AUTO: 251 K/UL
PMV BLD AUTO: 9.2 FL
POTASSIUM SERPL-SCNC: 3.2 MMOL/L
PROT SERPL-MCNC: 7 G/DL
RBC # BLD AUTO: 3.03 M/UL
SODIUM SERPL-SCNC: 142 MMOL/L
VANCOMYCIN SERPL-MCNC: 13.9 UG/ML
WBC # BLD AUTO: 9.66 K/UL

## 2018-05-09 PROCEDURE — 63600175 PHARM REV CODE 636 W HCPCS: Performed by: HOSPITALIST

## 2018-05-09 PROCEDURE — 94640 AIRWAY INHALATION TREATMENT: CPT

## 2018-05-09 PROCEDURE — 86480 TB TEST CELL IMMUN MEASURE: CPT

## 2018-05-09 PROCEDURE — 25000242 PHARM REV CODE 250 ALT 637 W/ HCPCS: Performed by: INTERNAL MEDICINE

## 2018-05-09 PROCEDURE — 27100092 HC HIGH FLOW DELIVERY CANNULA

## 2018-05-09 PROCEDURE — 63600175 PHARM REV CODE 636 W HCPCS: Performed by: STUDENT IN AN ORGANIZED HEALTH CARE EDUCATION/TRAINING PROGRAM

## 2018-05-09 PROCEDURE — 80202 ASSAY OF VANCOMYCIN: CPT

## 2018-05-09 PROCEDURE — 80053 COMPREHEN METABOLIC PANEL: CPT

## 2018-05-09 PROCEDURE — 25000003 PHARM REV CODE 250: Performed by: STUDENT IN AN ORGANIZED HEALTH CARE EDUCATION/TRAINING PROGRAM

## 2018-05-09 PROCEDURE — 92526 ORAL FUNCTION THERAPY: CPT

## 2018-05-09 PROCEDURE — 63600175 PHARM REV CODE 636 W HCPCS: Performed by: INTERNAL MEDICINE

## 2018-05-09 PROCEDURE — 99233 SBSQ HOSP IP/OBS HIGH 50: CPT | Mod: ,,, | Performed by: INTERNAL MEDICINE

## 2018-05-09 PROCEDURE — 83735 ASSAY OF MAGNESIUM: CPT

## 2018-05-09 PROCEDURE — 97530 THERAPEUTIC ACTIVITIES: CPT

## 2018-05-09 PROCEDURE — 85025 COMPLETE CBC W/AUTO DIFF WBC: CPT

## 2018-05-09 PROCEDURE — 94667 MNPJ CHEST WALL 1ST: CPT

## 2018-05-09 PROCEDURE — 27100171 HC OXYGEN HIGH FLOW UP TO 24 HOURS

## 2018-05-09 PROCEDURE — 20600001 HC STEP DOWN PRIVATE ROOM

## 2018-05-09 PROCEDURE — 94761 N-INVAS EAR/PLS OXIMETRY MLT: CPT

## 2018-05-09 PROCEDURE — 99900026 HC AIRWAY MAINTENANCE (STAT)

## 2018-05-09 RX ORDER — IPRATROPIUM BROMIDE AND ALBUTEROL SULFATE 2.5; .5 MG/3ML; MG/3ML
3 SOLUTION RESPIRATORY (INHALATION)
Status: CANCELLED | OUTPATIENT
Start: 2018-05-09

## 2018-05-09 RX ORDER — VANCOMYCIN/0.9 % SOD CHLORIDE 1 G/100 ML
1000 PLASTIC BAG, INJECTION (ML) INTRAVENOUS
Status: DISCONTINUED | OUTPATIENT
Start: 2018-05-09 | End: 2018-05-11

## 2018-05-09 RX ORDER — MAGNESIUM SULFATE HEPTAHYDRATE 40 MG/ML
2 INJECTION, SOLUTION INTRAVENOUS ONCE
Status: COMPLETED | OUTPATIENT
Start: 2018-05-09 | End: 2018-05-09

## 2018-05-09 RX ORDER — POTASSIUM CHLORIDE 7.45 MG/ML
10 INJECTION INTRAVENOUS
Status: COMPLETED | OUTPATIENT
Start: 2018-05-09 | End: 2018-05-09

## 2018-05-09 RX ORDER — FUROSEMIDE 10 MG/ML
60 INJECTION INTRAMUSCULAR; INTRAVENOUS ONCE
Status: COMPLETED | OUTPATIENT
Start: 2018-05-09 | End: 2018-05-09

## 2018-05-09 RX ORDER — FUROSEMIDE 10 MG/ML
INJECTION INTRAMUSCULAR; INTRAVENOUS
Status: DISPENSED
Start: 2018-05-09 | End: 2018-05-10

## 2018-05-09 RX ORDER — FUROSEMIDE 10 MG/ML
80 INJECTION INTRAMUSCULAR; INTRAVENOUS ONCE
Status: COMPLETED | OUTPATIENT
Start: 2018-05-09 | End: 2018-05-09

## 2018-05-09 RX ADMIN — FUROSEMIDE 80 MG: 10 INJECTION, SOLUTION INTRAMUSCULAR; INTRAVENOUS at 10:05

## 2018-05-09 RX ADMIN — VANCOMYCIN HYDROCHLORIDE 1 G: 10 INJECTION, POWDER, LYOPHILIZED, FOR SOLUTION INTRAVENOUS at 01:05

## 2018-05-09 RX ADMIN — THIAMINE HYDROCHLORIDE 100 MG: 100 INJECTION, SOLUTION INTRAMUSCULAR; INTRAVENOUS at 09:05

## 2018-05-09 RX ADMIN — POTASSIUM CHLORIDE 10 MEQ: 7.46 INJECTION, SOLUTION INTRAVENOUS at 04:05

## 2018-05-09 RX ADMIN — FUROSEMIDE 60 MG: 10 INJECTION, SOLUTION INTRAMUSCULAR; INTRAVENOUS at 03:05

## 2018-05-09 RX ADMIN — POTASSIUM CHLORIDE 10 MEQ: 7.46 INJECTION, SOLUTION INTRAVENOUS at 07:05

## 2018-05-09 RX ADMIN — PIPERACILLIN AND TAZOBACTAM 4.5 G: 4; .5 INJECTION, POWDER, LYOPHILIZED, FOR SOLUTION INTRAVENOUS; PARENTERAL at 10:05

## 2018-05-09 RX ADMIN — POTASSIUM CHLORIDE 10 MEQ: 7.46 INJECTION, SOLUTION INTRAVENOUS at 08:05

## 2018-05-09 RX ADMIN — IPRATROPIUM BROMIDE AND ALBUTEROL SULFATE 3 ML: .5; 3 SOLUTION RESPIRATORY (INHALATION) at 02:05

## 2018-05-09 RX ADMIN — FOLIC ACID 1 MG: 5 INJECTION, SOLUTION INTRAMUSCULAR; INTRAVENOUS; SUBCUTANEOUS at 08:05

## 2018-05-09 RX ADMIN — IPRATROPIUM BROMIDE AND ALBUTEROL SULFATE 3 ML: .5; 3 SOLUTION RESPIRATORY (INHALATION) at 08:05

## 2018-05-09 RX ADMIN — LORAZEPAM 1 MG: 2 INJECTION INTRAMUSCULAR at 09:05

## 2018-05-09 RX ADMIN — PIPERACILLIN AND TAZOBACTAM 4.5 G: 4; .5 INJECTION, POWDER, LYOPHILIZED, FOR SOLUTION INTRAVENOUS; PARENTERAL at 05:05

## 2018-05-09 RX ADMIN — MAGNESIUM SULFATE IN WATER 2 G: 40 INJECTION, SOLUTION INTRAVENOUS at 05:05

## 2018-05-09 RX ADMIN — PIPERACILLIN AND TAZOBACTAM 4.5 G: 4; .5 INJECTION, POWDER, LYOPHILIZED, FOR SOLUTION INTRAVENOUS; PARENTERAL at 12:05

## 2018-05-09 RX ADMIN — POTASSIUM CHLORIDE 10 MEQ: 7.46 INJECTION, SOLUTION INTRAVENOUS at 05:05

## 2018-05-09 NOTE — ASSESSMENT & PLAN NOTE
At OSH:  - Lactic acid 4.1 -> 2.5 -> 1.6  - WBC 24K -> 38K   - U/a negative  - CXR with evidence of RML consolidation  Upon arrival to [facility] [place]  - afebrile, RR 28-33 on ventilator, BP 82/54  - WBC 20, lactic acid 1.6, procal 9.9  - qSOFA 1/3, SIRS 2/4  - received NS bolus 1.5L , MAPs>65, no need for pressors, remained afebrile  - 1/2 Blood cx growing GPC resembling staph, respiratory cx rare GPC  - CXR -> interval worsening of infiltrations    5/9:  - BP and HR WNL, afebrile  - WBC down to 9K  - will continue on zosyn and vanc. Discontinued azithromycin  - will continue on broad spectrum abx and await speciation of cultures

## 2018-05-09 NOTE — PROVIDER TRANSFER
ICU Acceptance note    Date of Admit: 5/6/2018  Date of Transfer / Stepdown: 5/9/2018    Brief History of Present Illness and ICU hospital course:      Ryder Boo is a 63 y.o. 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 He was transferred from [facility] ICU to [facility] [place]. Patient was initially found down by family. Progressive hypoxemic and hypercapnic respiratory failure with hypotension.  Intubated, no low dose pressor and transferred for higher level of care.     He was admitted to medical ICU 5/6/18 for hypoxemic and hypercapnic respiratory failure with R> L airspace dz that progress over initial day at outside hospital. Upon arrival, patient was taken off of dopamine gtt and received 1.5L IV fluids to maintain MAPs. Continued on vanc/zosyn/azithromycin. Hyponatremia and ROHITH continued to improved with IVF. He was extubated in early afternoon and maintained normal O2 saturations on 3-4L oxygen on NC.   5/7: early morning he was noticed to be rattling while breathing by the nurse. Upon examination he had increased work of breathing with worsening of bilateral rhonchi. Repeat CXR revealed interval worsening of the patchy infiltrations. Received x1 furosemide 60 mg IV and transitioned to high flow oxygen for possible development of ARDS, he is currently maintaining normal saturations, will keep on intubation watch.  5/8: several episodes of desaturation down to 70% overnight and early morning, that responded well to increasing oxygen to 100%, currently on 35L/50%, mildly drowsy due to precedex. Responded well to diuretics, net -2.8L. Will discontinue precedex and will have speech evaluate his swallowing.  5/9: Uneventful night off precedex. Remains confused, oriented to person and place. Oxygen requirements greatly improved 15L/50% with plan to wean to low-flow NC today. Diuresed -2.9L  yesterday and discontinued lasix. Discontinued azithromycin and continued on zosyn. Failed speech eval by SLP and remains NPO.    Patient stepped down to [facility] medicine 5/9/18 for ongoing management of respiratory failure and suspected aspiration pneumonia. Recommend to continue to wean oxygen as tolerated and to diurese patient. Reported to be net neg 2.9L since admit. Patient currently on Zosyn and Vanc to cover for suspected aspiration pneumonia. Recommend to de-escalate if continues to improve by tomorrow. Monitoring patient for withdrawal in the setting of ETOH abuse and delirium noted to improve with precedex. PT/OT eval complete and recommending SNF.   Patient arrived to floor and noted to have increasing O2 requirements. Repeat CXR ordered and additional dose of lasix IV placed by ICU team. Order for high flow O2, continuous pulse ox, and Telemetry ordered.     Consultants and Procedures:     Consultants:  None    Procedures:    None    Transfer Information:     Diet:  NPO and SLP following    Physical Activity:  PT/OT and SLP    To Do / Pending Studies / Follow ups:  CM/SW to assist with SNF placement once medically stable.   SLP recommending patient to remain NPO on 5/9 and will need to address nutrition  Per ICU:   - Wean oxygen  - Continue zosyn and vanc. De-escalate as able  - Follow SLP reccs for dysphagia    Julia Holley PAROBYN  [facility] Medicine

## 2018-05-09 NOTE — PLAN OF CARE
Form 142 obtained and inputted into right care.  PASRR/142 hard copies placed in chart.    Ruperto Galeano LMSW  Ext. [phone]

## 2018-05-09 NOTE — ASSESSMENT & PLAN NOTE
In initial labs at OSH:  - EtOH undetectable, urine tox negative   on arrival to C:  - not agitated, no evidence of withdrawal or hepatic encephalopathy on exam  - LFTs within normal limits  - started on thiamine and folic acid daily  - started on precedex on 5/7 due to HTN/hallucination, discontinued 5/8 and monitor CIWA  - will monitor the electrolytes and replace as needed  - failed SLP evaluation. Continue working towards diet

## 2018-05-09 NOTE — PT/OT/SLP PROGRESS
Physical Therapy Treatment    Patient Name:  Ryder Boo   MRN:  [id number]    Recommendations:     Discharge Recommendations:  nursing facility, skilled   Discharge Equipment Recommendations:  (will determine DME needs closer to discharge.)   Barriers to discharge: Decreased caregiver support    Assessment:     Ryder Boo is a 63 y.o. male admitted with a medical diagnosis of Respiratory failure with hypoxia and hypercapnia.  He presents with the following impairments/functional limitations:  weakness, impaired endurance, gait instability, impaired balance, impaired functional mobilty, decreased lower extremity function, decreased safety awareness pt anil treatment fair and would benefit from cont skilled PT  4x;wk, to address deficits. Pt will need SNF placement when medically stable to maximize rehab potential.     Rehab Prognosis:  fair; patient would benefit from acute skilled PT services to address these deficits and reach maximum level of function.      Recent Surgery: * No surgery found *      Plan:     During this hospitalization, patient to be seen 4 x/week to address the above listed problems via gait training, therapeutic activities, therapeutic exercises, neuromuscular re-education  · Plan of Care Expires:  06/06/18   Plan of Care Reviewed with: patient    Subjective     Communicated with nurse prior to session.  Patient found supine upon PT entry to room, agreeable to treatment.      Chief Complaint: pt c/o pain in R ribs.   Patient comments/goals: to get better and go home.   Pain/Comfort:  · Pain Rating 1: 4/10  · Location - Side 1: Right  · Location 1:  (ribs)  · Pain Rating Post-Intervention 1: 4/10    Patients cultural, spiritual, Oriental orthodox conflicts given the current situation: none    Objective:     Patient found with: telemetry, pulse ox (continuous), blood pressure cuff, oxygen, SCD, peripheral IV, hess catheter (O2 comfort flow)     General Precautions: Standard, fall, aspiration    Orthopedic Precautions:N/A   Braces:       Functional Mobility:  · Bed Mobility:   Pt needed verbal cues for hand placement and sequencing for functional mobility.  · Rolling Left:  moderate assistance  · Rolling Right: moderate assistance  · Supine to Sit: moderate assistance  · Sit to Supine: minimum assistance  ·   · Transfers:     · Sit to Stand:  maximal assistance and of 2 persons with no AD pt was not able to come to upright posture with standing x 2 reps.   ·   · Balance: pt was able to sit on EOB x 5 min with supervision.      AM-PAC 6 CLICK MOBILITY  Turning over in bed (including adjusting bedclothes, sheets and blankets)?: 2  Sitting down on and standing up from a chair with arms (e.g., wheelchair, bedside commode, etc.): 1  Moving from lying on back to sitting on the side of the bed?: 2  Moving to and from a bed to a chair (including a wheelchair)?: 1  Need to walk in hospital room?: 1  Climbing 3-5 steps with a railing?: 1  Total Score: 8         Patient left supine with all lines intact and call button in reach..    GOALS:    Physical Therapy Goals        Problem: Physical Therapy Goal    Goal Priority Disciplines Outcome Goal Variances Interventions   Physical Therapy Goal     PT/OT, PT Ongoing (interventions implemented as appropriate)     Description:  Goals to be met by: 2018    Patient will increase functional independence with mobility by performin. Supine to sit with Moderate Assistance - not met  2. Sit to supine with Moderate Assistance -  Met   3. Sit to stand transfer with Moderate Assistance using LRAD or no AD - not met  4. Bed to chair transfer with Moderate Assistance using LRAD or no AD - not met  5. Gait  x 20 feet with Moderate Assistance using LRAD or no AD - not met  6. Lower extremity exercise program x20 reps per handout, with supervision - not met                       Time Tracking:     PT Received On: 18  PT Start Time: 1349     PT Stop Time: 1410  PT  Total Time (min): 21 min     Billable Minutes: Therapeutic Activity 21 min    Treatment Type: Treatment  PT/PTA: PT     PTA Visit Number: 0     Alisha Storm, PT  05/09/2018

## 2018-05-09 NOTE — PLAN OF CARE
142 received.    Pasrr/142 have been uploaded to [facility] [employer] system.    Ruperto Galeano LMSW  Ext. [phone]

## 2018-05-09 NOTE — NURSING
Patient titrated up to 20l and 50%. Family notified staff patient has been having melana stool, unknown if icu staff aware. Will notify md when they return the page.

## 2018-05-09 NOTE — PLAN OF CARE
Problem: Physical Therapy Goal  Goal: Physical Therapy Goal  Goals to be met by: 2018    Patient will increase functional independence with mobility by performin. Supine to sit with Moderate Assistance - not met  2. Sit to supine with Moderate Assistance -  Met   3. Sit to stand transfer with Moderate Assistance using LRAD or no AD - not met  4. Bed to chair transfer with Moderate Assistance using LRAD or no AD - not met  5. Gait  x 20 feet with Moderate Assistance using LRAD or no AD - not met  6. Lower extremity exercise program x20 reps per handout, with supervision - not met     Goals remain appropriate. Alisha Storm, PT 2018

## 2018-05-09 NOTE — NURSING
"Report given to JAIME danielson. Patient resting in bed, high flow nasal cannula at 20l and 50%. Patient remains tachypnic but not using his accessory muscles as much. Family at the bedside. Tele with pulse ox applied. Conferred with the MD, will hold lovenox as the family reported the patent is having "black" stools  "

## 2018-05-09 NOTE — PT/OT/SLP PROGRESS
Speech Language Pathology Treatment    Patient Name:  Ryder Boo   MRN:  [id number]   [id number] A    Admitting Diagnosis: <principal problem not specified>    Recommendations:                 General Recommendations:  Dysphagia therapy  Diet recommendations:  NPO, Liquid Diet Level: NPO   Aspiration Precautions: Frequent oral care and Strict aspiration precautions, 2-3 ice chips an hour s/p oral care for pleasure- monitor for s/s of aspiration and discontinue if noting: coughing, throat clearing, wet vocal quality  General Precautions: Standard, fall, aspiration, NPO  Communication strategies:  none    Subjective     Patient awake and cooperative. Communicated with RN prior to and s/p session.  Patient goals: to drink water     Pain/Comfort:  ·      Objective:     Has the patient been evaluated by SLP for swallowing?   Yes  Keep patient NPO? Yes   Current Respiratory Status: other (comment) (comfort flow )      Pt on comfort flow oxygen with sats at 91 upon entry increasing to 100 with HOB elevated. Pt with improved vocal intensity this date. Wet/gurgly vocal quality evident prior to po trials. Patient able to clear with cued coughs and suctioning. Oral care provided prior to po trials. Pt assessed with ice chips x3, tsp sip of water x1, tsp sip of honey thick water x1, 1/2 tsp bite of apple sauce x2, 1/2 tsp bites of pudding x2. Pt with immediate cough response following tsp of water and honey thick water. Pt with wet vocal quality and delayed cough following all further trials. Suctioning provided s/p each trial with noted pudding and apple sauce suctioned s/p cough. Reviewed s/s of aspiration and risk for aspiration. Pt should remain npo at this time.     Assessment:     Ryder Boo is a 63 y.o. male with an SLP diagnosis of Dysphagia. ST will continue to follow.   Goals:    SLP Goals        Problem: SLP Goal    Goal Priority Disciplines Outcome   SLP Goal     SLP Ongoing (interventions implemented as  appropriate)   Description:  Goals to be met 5/15  Pt will participate in ongoing swallow eval                     Plan:     · Patient to be seen:  5 x/week   · Plan of Care reviewed with:  patient   · SLP Follow-Up:  Yes       Discharge recommendations:  other (see comments) (tbd)       Time Tracking:     SLP Treatment Date:   05/09/18  Speech Start Time:  0924  Speech Stop Time:  0947     Speech Total Time (min):  23 min    Billable Minutes: Treatment Swallowing Dysfunction 23    SHAUN Hercules, CCC-SLP   Pager: [phone]  05/09/2018

## 2018-05-09 NOTE — ASSESSMENT & PLAN NOTE
At OSH:  - WBC elevated up to 38K  - CXR with bilateral multifocal infiltration (more pronounced in RML)  - Most likely due to aspiration secondary to AMS    On arrival to [facility]:  - afebrile, mildly tachypneic, with scattered rhonchi and wheezing on exam  - Repeat CXR: with interval worsening of infiltration/consolidation in RML  - repeat WBC 20K (90% PMN), procalcitonin -> 9.9  - obtained influenza Ag -> negative    - covered broadly with vancomycin + zosyn + azithromycin  - continued on mechanical ventilation   - extubated on 5/6  to 3-4L NC, increased work of breathing and rhonchi on exam overnight  - interval worsening of patchy infiltrations on CXR suggestive of developing ARDS vs pulmonary edema due to IVF  - patient mentioning Hx of TB in 2001 which was treated, confirmed with the sister, sputum sent for AFB  - respiratory cultures -> rare GPC, 1/2 blood cx growing GCP resembling staph    5/9:  - AFB pending  - Legionella Ag negative  - will continue broad spectrum antibiotics, re-dosed vanc to 1g q24h  - received lasix 60 mg IV x3 so far, net -2.9L over the last 24 hr, hold further diuresis  - currently on 15L/50% comfort flow, will try to wean down oxygen

## 2018-05-09 NOTE — NURSING
Received patient from ICU. Patient on 10l on high flow nasal cannula and 81%. Patient turned up to 15l. Attempted to contact the icu nurse yobani to get his baseline as I was not in the room when he first arrived- respirtory therapy called, unable to get ahold of the therapist and team called.

## 2018-05-09 NOTE — PLAN OF CARE
Problem: Patient Care Overview  Goal: Plan of Care Review  Dx: Pneumonia   Hx: HTN, COPD and alcoholism, who is a transfer from [facility] ICU to [facility] on mechanical ventilation after being found unresponsive at home    4/5: Transferred to [facility] on mechanical ventilation. Dopamine gtt off.   4/6: Pt extubated  5/7: Echo         RN  MAP>65    *Pt likes [employer] channel 3       Pt remains disoriented to situation and time. No c/o pain throughout the day. Precedex d/c this afternoon. Pt remains calm and compliant with therapeutic regimen. Comfort flow set at fiO2 50% and 20L. Speech at bedside today for swallow study. Pt is resting in bed. No distress noted. Will continue to monitor.

## 2018-05-09 NOTE — NURSING
Care transferred from Lakeisha Rodrigez. Pt is Aox3/4, occacionally disoriented to situation. On 20L HFNC 50% Fio2. Sat 91%. RR 28 at this time, using accessory muscles. Lungs coarse all throughout .Pt denies SOB. Sr  On tele, denies cp. Pulses intact.  Pt is NPO at this time. Blood sugar stable. voids once per external condom cath. No BM at this time. Pt denies pain at this time. HERNANDEZ, generalized weakness noted. Safety precautions in place / wctm.

## 2018-05-09 NOTE — ASSESSMENT & PLAN NOTE
Former smoker with Hx of COPD, overlapped with volume overload due to acute decompensated HF  2+ pitting edema on lower extremities + bilateral rhonchi and wheezing on the exam    PFT on 5/31/2017 at OSH:    Moderate obstructive + mild restrictive pattern    FEV1/FVC 60%  2D echo on 2/2018 at OSH:    EF 55-60%   Normal LA pressure   mild TVR  BNP on 5/4: 349    - Repeat CXR: fibrotic changes in DIAMANTE + patchy infiltration specially in RML   - ECG sinus tachycardia, Troponin normal, repeat   - extubated on 5/6 -> increased work of breathing overnight => transitioned to high flow for possible development of ARDS  - diuresed with lasix 60 mg x3 so far, with good response (net -2.6L since admit). Holding further diureses  - ABG on 5/8 with pH WNL  - PT/OT evaluation  - On 15L/50%. Weaning as tolerated

## 2018-05-09 NOTE — PLAN OF CARE
Problem: SLP Goal  Goal: SLP Goal  Goals to be met 5/15  Pt will participate in ongoing swallow eval    Outcome: Ongoing (interventions implemented as appropriate)  Goals remain appropriate. ST will continue to follow. Recommend: NPO, consider alternative means of nutrition/hydration/medicaiton in the interim, excellent and frequent oral care.   DALE Vasquez., CCC-SLP  Pager: [phone]  05/09/2018

## 2018-05-09 NOTE — RESIDENT HANDOFF
Handoff     Primary Team: [facility] CRITICAL CARE MEDICINE Room Number: 6094/6094 A     Patient Name: Ryder Boo MRN: [id number]     Date of Birth: [id number] Allergies: Patient has no known allergies.     Age: 63 y.o. Admit Date: 5/6/2018     Sex: male  BMI: Body mass index is 28.68 kg/m².     Code Status: Full Code        Illness Level (current clinical status): Watcher - No    Reason for Admission: Respiratory failure with hypoxia and hypercapnia    Brief HPI (pertinent PMH and diagnosis or differential diagnosis):   The patient is a 62 y/o M new to our system, with HTN, COPD and alcoholism, who is transfer from [facility] ICU to [facility] on mechanical ventilation.     The patient lives with his brother across the street from his sister. He was found unresponsive in a chair with small amount of blood in his mouth by his nephew and taken to the hospital by EMS on 5/3. Patient drinks every day and has been having frequent falls recently. Per sister he has quit smoking 15 years ago.  In the ED at OSH he was found to be hypoxic and hypotensive, with undetectable EtOH levels, leukocytosis (24K) and hyponatremia (119) in the labs. he received narcan x1, IV fluids, started on metronidazole and ceftriaxone (also x1 of zosyn, azithromycin, vancomycin, ceftaroline) for aspiration pneumonia, and was put on 15L of Oxygen with non-rebreather, which improved his mental status and BP, however on 4/5 upon weaning off of oxygen he developed hypoxic hypercapnic respiratory failure and subsequent acidosis (pH 7.10), therefore he was intubated and transferred to the ICU. Patient arrived to [facility] on dopamine gtt which per notes was started to maintain BP in the setting of hyponatremia.     Procedure Date: None    Hospital Course   5/6: upon arrival, patient was taken off of dopamine gtt and received 1.5L IV fluids to maintain MAPs. Continued on vanc/zosyn/azithromycin. Hyponatremia and ROHITH continued to improved with IVF. He was extubated in  early afternoon and maintained normal O2 saturations on 3-4L oxygen on NC.   5/7: early morning he was noticed to be rattling while breathing by the nurse. Upon examination he had increased work of breathing with worsening of bilateral rhonchi. Repeat CXR revealed interval worsening of the patchy infiltrations. Received x1 furosemide 60 mg IV and transitioned to high flow oxygen for possible development of ARDS, he is currently maintaining normal saturations, will keep on intubation watch.  5/8: several episodes of desaturation down to 70% overnight and early morning, that responded well to increasing oxygen to 100%, currently on 35L/50%, mildly drowsy due to precedex. Responded well to diuretics, net -2.8L. Will discontinue precedex and will have speech evaluate his swallowing.  5/9: Uneventful night off precedex. Remains confused, oriented to person and place. Oxygen requirements greatly improved 15L/50% with plan to wean to low-flow NC today. Diuresed -2.9L yesterday and discontinued lasix. Discontinued azithromycin and continued on zosyn. Failed speech eval by SLP and remains NPO.    Tasks:  - Wean oxygen  - Continue zosyn and vanc. De-escalate as able  - Follow SLP reccs for dysphagia    Discharge Disposition: Home or Self Care

## 2018-05-09 NOTE — SUBJECTIVE & OBJECTIVE
Interval History/Significant Events: No acute events overnight. Mental status remains largely unchanged off precedex. Remains NPO, per SLP reccs.    Review of Systems   Constitutional: Negative for chills and fever.   HENT: Positive for trouble swallowing. Negative for congestion and sore throat.    Respiratory: Negative for chest tightness and shortness of breath.    Cardiovascular: Negative for chest pain, palpitations and leg swelling.   Gastrointestinal: Negative for abdominal pain, constipation, diarrhea, nausea and vomiting.   Genitourinary: Negative for difficulty urinating, frequency and hematuria.   Musculoskeletal: Negative for arthralgias and myalgias.   Skin: Negative for rash and wound.   Neurological: Negative for tremors, seizures, weakness and headaches.   Psychiatric/Behavioral: Negative for agitation and confusion. The patient is not nervous/anxious.      Objective:     Vital Signs (Most Recent):  Temp: 98.5 °F (36.9 °C) (05/09/18 1100)  Pulse: 76 (05/09/18 1100)  Resp: (!) 25 (05/09/18 1100)  BP: (!) 157/86 (05/09/18 1100)  SpO2: 99 % (05/09/18 1100) Vital Signs (24h Range):  Temp:  [97.6 °F (36.4 °C)-98.9 °F (37.2 °C)] 98.5 °F (36.9 °C)  Pulse:  [] 76  Resp:  [18-51] 25  SpO2:  [89 %-100 %] 99 %  BP: (136-165)/(70-95) 157/86   Weight: 80.6 kg (177 lb 11.1 oz)  Body mass index is 28.68 kg/m².      Intake/Output Summary (Last 24 hours) at 05/09/18 1158  Last data filed at 05/09/18 1100   Gross per 24 hour   Intake             1543 ml   Output             2430 ml   Net             -887 ml       Physical Exam   Constitutional: He is oriented to person, place, and time. He appears well-developed and well-nourished. He is cooperative. No distress. Nasal cannula in place.   Alert, oriented to person and place.   HENT:   Head: Normocephalic and atraumatic.   Mouth/Throat: Mucous membranes are dry.   Eyes: Conjunctivae and EOM are normal. Pupils are equal, round, and reactive to light.   Neck:  Normal range of motion.   Cardiovascular: Regular rhythm and normal heart sounds.  Tachycardia present.  Exam reveals no gallop.    No murmur heard.  Pulses:       Radial pulses are 2+ on the right side, and 2+ on the left side.   Pulmonary/Chest: No accessory muscle usage. No tachypnea. No respiratory distress. He has no decreased breath sounds. He has no wheezes. He has rhonchi (scattered (R>L)). He has no rales.   Abdominal: Soft. Bowel sounds are normal. He exhibits no distension. There is no tenderness. There is no rigidity, no rebound and no guarding.   Musculoskeletal: Normal range of motion. He exhibits no edema or tenderness.   Neurological: He is oriented to person, place, and time. He displays no tremor. He displays no seizure activity.   Skin: Skin is warm. Capillary refill takes less than 2 seconds. No rash noted. He is not diaphoretic. No cyanosis or erythema.   Psychiatric: He has a normal mood and affect. His speech is normal and behavior is normal.   Nursing note and vitals reviewed.      Vents:  Vent Mode: A/C (05/06/18 1307)  Ventilator Initiated: Yes (05/06/18 0045)  Set Rate: 14 bmp (05/06/18 1307)  Vt Set: 450 mL (05/06/18 1307)  PEEP/CPAP: 5 cmH20 (05/06/18 1307)  Oxygen Concentration (%): 50 (05/09/18 1100)  Peak Airway Pressure: 26 cmH2O (05/06/18 1307)  Total Ve: 11 mL (05/06/18 1307)  F/VT Ratio<105 (RSBI): (!) 45.56 (05/06/18 1307)  Lines/Drains/Airways     Drain            Male External Urinary Catheter 05/08/18 Medium 1 day          Pressure Ulcer                 Pressure Injury 05/06/18 0251 Left anterior Buttocks Stage 2 3 days          Peripheral Intravenous Line                 Peripheral IV - Single Lumen 05/07/18 1100 Left Forearm 2 days         Peripheral IV - Single Lumen 05/07/18 Left Hand 2 days         Peripheral IV - Single Lumen 05/07/18 Right Forearm 2 days              Significant Labs:    CBC/Anemia Profile:    Recent Labs  Lab 05/08/18  0400 05/09/18  0313   WBC 11.19  9.66   HGB 9.5* 9.3*   HCT 29.8* 28.1*    251   MCV 94 93   RDW 13.5 13.5        Chemistries:    Recent Labs  Lab 05/08/18  0400 05/08/18  1809 05/09/18  0313    142 142   K 3.2* 3.4* 3.2*   CL 93* 94* 89*   CO2 33* 36* 42*   BUN 36* 35* 34*   CREATININE 1.3 1.3 1.3   CALCIUM 9.4 9.3 8.8   ALBUMIN 1.7*  --  1.7*   PROT 6.9  --  7.0   BILITOT 0.4  --  0.4   ALKPHOS 66  --  57   ALT 11  --  8*   AST 17  --  15   MG 1.4*  --  1.4*   PHOS 4.5  --   --      Significant Imaging:  No new imaging

## 2018-05-09 NOTE — PROGRESS NOTES
[facility]  Critical Care Medicine  Progress Note    Patient Name: Ryder Boo  MRN: [id number]  Admission Date: 5/6/2018  Hospital Length of Stay: 3 days  Code Status: Full Code  Attending Provider: Johana Cary MD  Primary Care Provider: Primary Doctor No   Principal Problem: Respiratory failure with hypoxia and hypercapnia    Subjective:     HPI:  The patient is a 64 y/o M new to our system, with HTN, COPD and alcoholism, who is transfer from [facility] ICU to [facility] [place] mechanical ventilation.    The patient lives with his brother across the street from his sister. He was found unresponsive in a chair with small amount of blood in his mouth by his nephew and taken to the hospital by EMS on 5/3. Patient drinks every day and has been having frequent falls recently. Per sister he has quit smoking 15 years ago.  In the ED at OSH he was found to be hypoxic and hypotensive, with undetectable EtOH levels, leukocytosis (24K) and hyponatremia (119) in the labs. he received narcan x1, IV fluids, started on metronidazole and ceftriaxone (also x1 of zosyn, azithromycin, vancomycin, ceftaroline) for aspiration pneumonia, and was put on 15L of Oxygen with non-rebreather, which improved his mental status and BP, however on 4/5 upon weaning off of oxygen he developed hypoxic hypercapnic respiratory failure and subsequent acidosis (pH 7.10), therefore he was intubated and transferred to the ICU. Patient arrived to [facility] on dopamine gtt which per notes was started to maintain BP in the setting of hyponatremia.     Hospital/ICU Course:  5/6: upon arrival, patient was taken off of dopamine gtt and received 1.5L IV fluids to maintain MAPs. Continued on vanc/zosyn/azithromycin. Hyponatremia and ROHITH continued to improved with IVF. He was extubated in early afternoon and maintained normal O2 saturations on 3-4L oxygen on NC.   5/7: early morning he was noticed to be rattling while breathing by the nurse. Upon  examination he had increased work of breathing with worsening of bilateral rhonchi. Repeat CXR revealed interval worsening of the patchy infiltrations. Received x1 furosemide 60 mg IV and transitioned to high flow oxygen for possible development of ARDS, he is currently maintaining normal saturations, will keep on intubation watch.  5/8: several episodes of desaturation down to 70% overnight and early morning, that responded well to increasing oxygen to 100%, currently on 35L/50%, mildly drowsy due to precedex. Responded well to diuretics, net -2.8L. Will discontinue precedex and will have speech evaluate his swallowing.  5/9: Uneventful night off precedex. Remains confused, oriented to person and place. Oxygen requirements greatly improved 15L/50% with plan to wean to low-flow NC today. Diuresed -2.9L yesterday and discontinued lasix. Discontinued azithromycin and continued on zosyn. Failed speech eval by SLP and remains NPO.    Interval History/Significant Events: No acute events overnight. Mental status remains largely unchanged off precedex. Remains NPO, per SLP reccs.    Review of Systems   Constitutional: Negative for chills and fever.   HENT: Positive for trouble swallowing. Negative for congestion and sore throat.    Respiratory: Negative for chest tightness and shortness of breath.    Cardiovascular: Negative for chest pain, palpitations and leg swelling.   Gastrointestinal: Negative for abdominal pain, constipation, diarrhea, nausea and vomiting.   Genitourinary: Negative for difficulty urinating, frequency and hematuria.   Musculoskeletal: Negative for arthralgias and myalgias.   Skin: Negative for rash and wound.   Neurological: Negative for tremors, seizures, weakness and headaches.   Psychiatric/Behavioral: Negative for agitation and confusion. The patient is not nervous/anxious.      Objective:     Vital Signs (Most Recent):  Temp: 98.5 °F (36.9 °C) (05/09/18 1100)  Pulse: 76 (05/09/18 1100)  Resp:  (!) 25 (05/09/18 1100)  BP: (!) 157/86 (05/09/18 1100)  SpO2: 99 % (05/09/18 1100) Vital Signs (24h Range):  Temp:  [97.6 °F (36.4 °C)-98.9 °F (37.2 °C)] 98.5 °F (36.9 °C)  Pulse:  [] 76  Resp:  [18-51] 25  SpO2:  [89 %-100 %] 99 %  BP: (136-165)/(70-95) 157/86   Weight: 80.6 kg (177 lb 11.1 oz)  Body mass index is 28.68 kg/m².      Intake/Output Summary (Last 24 hours) at 05/09/18 1158  Last data filed at 05/09/18 1100   Gross per 24 hour   Intake             1543 ml   Output             2430 ml   Net             -887 ml       Physical Exam   Constitutional: He is oriented to person, place, and time. He appears well-developed and well-nourished. He is cooperative. No distress. Nasal cannula in place.   Alert, oriented to person and place.   HENT:   Head: Normocephalic and atraumatic.   Mouth/Throat: Mucous membranes are dry.   Eyes: Conjunctivae and EOM are normal. Pupils are equal, round, and reactive to light.   Neck: Normal range of motion.   Cardiovascular: Regular rhythm and normal heart sounds.  Tachycardia present.  Exam reveals no gallop.    No murmur heard.  Pulses:       Radial pulses are 2+ on the right side, and 2+ on the left side.   Pulmonary/Chest: No accessory muscle usage. No tachypnea. No respiratory distress. He has no decreased breath sounds. He has no wheezes. He has rhonchi (scattered (R>L)). He has no rales.   Abdominal: Soft. Bowel sounds are normal. He exhibits no distension. There is no tenderness. There is no rigidity, no rebound and no guarding.   Musculoskeletal: Normal range of motion. He exhibits no edema or tenderness.   Neurological: He is oriented to person, place, and time. He displays no tremor. He displays no seizure activity.   Skin: Skin is warm. Capillary refill takes less than 2 seconds. No rash noted. He is not diaphoretic. No cyanosis or erythema.   Psychiatric: He has a normal mood and affect. His speech is normal and behavior is normal.   Nursing note and vitals  reviewed.      Vents:  Vent Mode: A/C (05/06/18 1307)  Ventilator Initiated: Yes (05/06/18 0045)  Set Rate: 14 bmp (05/06/18 1307)  Vt Set: 450 mL (05/06/18 1307)  PEEP/CPAP: 5 cmH20 (05/06/18 1307)  Oxygen Concentration (%): 50 (05/09/18 1100)  Peak Airway Pressure: 26 cmH2O (05/06/18 1307)  Total Ve: 11 mL (05/06/18 1307)  F/VT Ratio<105 (RSBI): (!) 45.56 (05/06/18 1307)  Lines/Drains/Airways     Drain            Male External Urinary Catheter 05/08/18 Medium 1 day          Pressure Ulcer                 Pressure Injury 05/06/18 0251 Left anterior Buttocks Stage 2 3 days          Peripheral Intravenous Line                 Peripheral IV - Single Lumen 05/07/18 1100 Left Forearm 2 days         Peripheral IV - Single Lumen 05/07/18 Left Hand 2 days         Peripheral IV - Single Lumen 05/07/18 Right Forearm 2 days              Significant Labs:    CBC/Anemia Profile:    Recent Labs  Lab 05/08/18  0400 05/09/18  0313   WBC 11.19 9.66   HGB 9.5* 9.3*   HCT 29.8* 28.1*    251   MCV 94 93   RDW 13.5 13.5        Chemistries:    Recent Labs  Lab 05/08/18  0400 05/08/18  1809 05/09/18  0313    142 142   K 3.2* 3.4* 3.2*   CL 93* 94* 89*   CO2 33* 36* 42*   BUN 36* 35* 34*   CREATININE 1.3 1.3 1.3   CALCIUM 9.4 9.3 8.8   ALBUMIN 1.7*  --  1.7*   PROT 6.9  --  7.0   BILITOT 0.4  --  0.4   ALKPHOS 66  --  57   ALT 11  --  8*   AST 17  --  15   MG 1.4*  --  1.4*   PHOS 4.5  --   --      Significant Imaging:  No new imaging    Assessment/Plan:     Psychiatric   Alcoholism /alcohol abuse    In initial labs at OSH:  - EtOH undetectable, urine tox negative   on arrival to [facility]:  - not agitated, no evidence of withdrawal or hepatic encephalopathy on exam  - LFTs within normal limits  - started on thiamine and folic acid daily  - started on precedex on 5/7 due to HTN/hallucination, discontinued 5/8 and monitor CIWA  - will monitor the electrolytes and replace as needed  - failed SLP evaluation. Continue working towards  diet        Pulmonary   * Respiratory failure with hypoxia and hypercapnia    Former smoker with Hx of COPD, overlapped with volume overload due to acute decompensated HF  2+ pitting edema on lower extremities + bilateral rhonchi and wheezing on the exam    PFT on 5/31/2017 at OSH:    Moderate obstructive + mild restrictive pattern    FEV1/FVC 60%  2D echo on 2/2018 at OSH:    EF 55-60%   Normal LA pressure   mild TVR  BNP on 5/4: 349    - Repeat CXR: fibrotic changes in DIAMANTE + patchy infiltration specially in RML   - ECG sinus tachycardia, Troponin normal, repeat   - extubated on 5/6 -> increased work of breathing overnight => transitioned to high flow for possible development of ARDS  - diuresed with lasix 60 mg x3 so far, with good response (net -2.6L since admit). Holding further diureses  - ABG on 5/8 with pH WNL  - PT/OT evaluation  - On 15L/50%. Weaning as tolerated        Pneumonia    At OSH:  - WBC elevated up to 38K  - CXR with bilateral multifocal infiltration (more pronounced in RML)  - Most likely due to aspiration secondary to AMS    On arrival to C:  - afebrile, mildly tachypneic, with scattered rhonchi and wheezing on exam  - Repeat CXR: with interval worsening of infiltration/consolidation in RML  - repeat WBC 20K (90% PMN), procalcitonin -> 9.9  - obtained influenza Ag -> negative    - covered broadly with vancomycin + zosyn + azithromycin  - continued on mechanical ventilation   - extubated on 5/6  to 3-4L NC, increased work of breathing and rhonchi on exam overnight  - interval worsening of patchy infiltrations on CXR suggestive of developing ARDS vs pulmonary edema due to IVF  - patient mentioning Hx of TB in 2001 which was treated, confirmed with the sister, sputum sent for AFB  - respiratory cultures -> rare GPC, 1/2 blood cx growing GCP resembling staph    5/9:  - AFB pending  - Legionella Ag negative  - will continue broad spectrum antibiotics, re-dosed vanc to 1g q24h  - received  lasix 60 mg IV x3 so far, net -2.9L over the last 24 hr, hold further diuresis  - currently on 15L/50% comfort flow, will try to wean down oxygen        Renal/   ROHITH (acute kidney injury)    Initial Cr at OSH elevated to 2.8 which trended down with IV fluids  Most likely due to poor PO intake and dehydration    - Repeat BMP upon arrival to C 1.5  - currently resolved, Cr~1.3          ID   Sepsis    At OSH:  - Lactic acid 4.1 -> 2.5 -> 1.6  - WBC 24K -> 38K   - U/a negative  - CXR with evidence of RML consolidation  Upon arrival to C  - afebrile, RR 28-33 on ventilator, BP 82/54  - WBC 20, lactic acid 1.6, procal 9.9  - qSOFA 1/3, SIRS 2/4  - received NS bolus 1.5L , MAPs>65, no need for pressors, remained afebrile  - 1/2 Blood cx growing GPC resembling staph, respiratory cx rare GPC  - CXR -> interval worsening of infiltrations    5/9:  - BP and HR WNL, afebrile  - WBC down to 9K  - will continue on zosyn and vanc. Discontinued azithromycin  - will continue on broad spectrum abx and await speciation of cultures           Critical Care Daily Checklist:    A: Awake: RASS Goal/Actual Goal: RASS Goal: 0-->alert and calm  Actual: [place] Agitation Sedation Scale (RASS): Alert and calm   B: Spontaneous Breathing Trial Performed? Spon. Breathing Trial Initiated?: Initiated (05/06/18 6150)   C: SAT & SBT Coordinated?  N/A                      D: Delirium: CAM-ICU     E: Early Mobility Performed? Yes   F: Feeding Goal:    Status:     Current Diet Order   Procedures    Diet NPO      AS: Analgesia/Sedation N/A   T: Thromboembolic Prophylaxis Lovenox 40mg   H: HOB > 300 Yes   U: Stress Ulcer Prophylaxis (if needed) N/A   G: Glucose Control N/A   B: Bowel Function Stool Occurrence: 1   I: Indwelling Catheter (Lines & Banda) Necessity None   D: De-escalation of Antimicrobials/Pharmacotherapies D/c'd azithromycin. On vanc/zosyn    Plan for the day/ETD Step down    Code Status:  Family/Goals of Care: Full Code        Critical secondary to Patient has a condition that poses threat to life and bodily function: Severe Respiratory Distress      Critical care was time spent personally by me on the following activities: development of treatment plan with patient or surrogate and bedside caregivers, discussions with consultants, evaluation of patient's response to treatment, examination of patient, ordering and performing treatments and interventions, ordering and review of laboratory studies, ordering and review of radiographic studies, pulse oximetry, re-evaluation of patient's condition. This critical care time did not overlap with that of any other provider or involve time for any procedures.     Black Lorenzo MD  Critical Care Medicine  [facility]

## 2018-05-10 PROBLEM — E83.42 HYPOMAGNESEMIA: Status: ACTIVE | Noted: 2018-05-10

## 2018-05-10 PROBLEM — G93.41 ENCEPHALOPATHY, METABOLIC: Status: ACTIVE | Noted: 2018-05-10

## 2018-05-10 PROBLEM — E87.6 HYPOKALEMIA: Status: ACTIVE | Noted: 2018-05-10

## 2018-05-10 PROBLEM — D64.9 ANEMIA: Status: ACTIVE | Noted: 2018-05-10

## 2018-05-10 LAB
ALBUMIN SERPL BCP-MCNC: 1.9 G/DL
ALLENS TEST: ABNORMAL
ALP SERPL-CCNC: 58 U/L
ALT SERPL W/O P-5'-P-CCNC: 8 U/L
ANION GAP SERPL CALC-SCNC: 17 MMOL/L
AST SERPL-CCNC: 16 U/L
BASOPHILS # BLD AUTO: 0.03 K/UL
BASOPHILS NFR BLD: 0.3 %
BILIRUB SERPL-MCNC: 0.4 MG/DL
BUN SERPL-MCNC: 34 MG/DL
CALCIUM SERPL-MCNC: 9.5 MG/DL
CHLORIDE SERPL-SCNC: 88 MMOL/L
CO2 SERPL-SCNC: 43 MMOL/L
CREAT SERPL-MCNC: 1.3 MG/DL
DIFFERENTIAL METHOD: ABNORMAL
EOSINOPHIL # BLD AUTO: 0 K/UL
EOSINOPHIL NFR BLD: 0.3 %
ERYTHROCYTE [DISTWIDTH] IN BLOOD BY AUTOMATED COUNT: 13.4 %
EST. GFR  (AFRICAN AMERICAN): >60 ML/MIN/1.73 M^2
EST. GFR  (NON AFRICAN AMERICAN): 58.5 ML/MIN/1.73 M^2
GLUCOSE SERPL-MCNC: 101 MG/DL
HCO3 UR-SCNC: 52.6 MMOL/L (ref 24–28)
HCT VFR BLD AUTO: 34.8 %
HGB BLD-MCNC: 10.9 G/DL
IMM GRANULOCYTES # BLD AUTO: 0.19 K/UL
IMM GRANULOCYTES NFR BLD AUTO: 1.6 %
LYMPHOCYTES # BLD AUTO: 0.8 K/UL
LYMPHOCYTES NFR BLD: 7 %
MAGNESIUM SERPL-MCNC: 1.4 MG/DL
MCH RBC QN AUTO: 29.9 PG
MCHC RBC AUTO-ENTMCNC: 31.3 G/DL
MCV RBC AUTO: 96 FL
MONOCYTES # BLD AUTO: 0.8 K/UL
MONOCYTES NFR BLD: 6.6 %
NEUTROPHILS # BLD AUTO: 9.9 K/UL
NEUTROPHILS NFR BLD: 84.2 %
NRBC BLD-RTO: 0 /100 WBC
PCO2 BLDA: 64.2 MMHG (ref 35–45)
PH SMN: 7.52 [PH] (ref 7.35–7.45)
PLATELET # BLD AUTO: 321 K/UL
PMV BLD AUTO: 9.2 FL
PO2 BLDA: 61 MMHG (ref 80–100)
POC BE: 30 MMOL/L
POC SATURATED O2: 92 % (ref 95–100)
POC TCO2: >50 MMOL/L (ref 23–27)
POCT GLUCOSE: 113 MG/DL (ref 70–110)
POTASSIUM SERPL-SCNC: 3.1 MMOL/L
PROT SERPL-MCNC: 7.7 G/DL
RBC # BLD AUTO: 3.64 M/UL
SAMPLE: ABNORMAL
SITE: ABNORMAL
SODIUM SERPL-SCNC: 148 MMOL/L
WBC # BLD AUTO: 11.75 K/UL

## 2018-05-10 PROCEDURE — 63600175 PHARM REV CODE 636 W HCPCS: Performed by: STUDENT IN AN ORGANIZED HEALTH CARE EDUCATION/TRAINING PROGRAM

## 2018-05-10 PROCEDURE — 97110 THERAPEUTIC EXERCISES: CPT

## 2018-05-10 PROCEDURE — 99900035 HC TECH TIME PER 15 MIN (STAT)

## 2018-05-10 PROCEDURE — 25000003 PHARM REV CODE 250: Performed by: STUDENT IN AN ORGANIZED HEALTH CARE EDUCATION/TRAINING PROGRAM

## 2018-05-10 PROCEDURE — 27000190 HC CPAP FULL FACE MASK W/VALVE

## 2018-05-10 PROCEDURE — 94640 AIRWAY INHALATION TREATMENT: CPT

## 2018-05-10 PROCEDURE — S5010 5% DEXTROSE AND 0.45% SALINE: HCPCS | Performed by: STUDENT IN AN ORGANIZED HEALTH CARE EDUCATION/TRAINING PROGRAM

## 2018-05-10 PROCEDURE — 94668 MNPJ CHEST WALL SBSQ: CPT

## 2018-05-10 PROCEDURE — 97530 THERAPEUTIC ACTIVITIES: CPT

## 2018-05-10 PROCEDURE — 80053 COMPREHEN METABOLIC PANEL: CPT

## 2018-05-10 PROCEDURE — 36600 WITHDRAWAL OF ARTERIAL BLOOD: CPT

## 2018-05-10 PROCEDURE — 25500020 PHARM REV CODE 255: Performed by: HOSPITALIST

## 2018-05-10 PROCEDURE — 83735 ASSAY OF MAGNESIUM: CPT

## 2018-05-10 PROCEDURE — 94660 CPAP INITIATION&MGMT: CPT

## 2018-05-10 PROCEDURE — 85025 COMPLETE CBC W/AUTO DIFF WBC: CPT

## 2018-05-10 PROCEDURE — 27000221 HC OXYGEN, UP TO 24 HOURS

## 2018-05-10 PROCEDURE — 82803 BLOOD GASES ANY COMBINATION: CPT

## 2018-05-10 PROCEDURE — 25000242 PHARM REV CODE 250 ALT 637 W/ HCPCS: Performed by: INTERNAL MEDICINE

## 2018-05-10 PROCEDURE — 82272 OCCULT BLD FECES 1-3 TESTS: CPT

## 2018-05-10 PROCEDURE — 99233 SBSQ HOSP IP/OBS HIGH 50: CPT | Mod: ,,, | Performed by: HOSPITALIST

## 2018-05-10 PROCEDURE — 94761 N-INVAS EAR/PLS OXIMETRY MLT: CPT

## 2018-05-10 PROCEDURE — 20600001 HC STEP DOWN PRIVATE ROOM

## 2018-05-10 PROCEDURE — 36415 COLL VENOUS BLD VENIPUNCTURE: CPT

## 2018-05-10 RX ORDER — CHLORTHALIDONE 25 MG/1
25 TABLET ORAL DAILY
COMMUNITY

## 2018-05-10 RX ORDER — DEXTROSE MONOHYDRATE AND SODIUM CHLORIDE 5; .45 G/100ML; G/100ML
INJECTION, SOLUTION INTRAVENOUS CONTINUOUS
Status: DISCONTINUED | OUTPATIENT
Start: 2018-05-10 | End: 2018-05-11

## 2018-05-10 RX ORDER — MAGNESIUM SULFATE HEPTAHYDRATE 40 MG/ML
2 INJECTION, SOLUTION INTRAVENOUS ONCE
Status: COMPLETED | OUTPATIENT
Start: 2018-05-10 | End: 2018-05-10

## 2018-05-10 RX ORDER — LOSARTAN POTASSIUM 100 MG/1
100 TABLET ORAL DAILY
Status: ON HOLD | COMMUNITY
End: 2018-05-24 | Stop reason: HOSPADM

## 2018-05-10 RX ORDER — POTASSIUM CHLORIDE 7.45 MG/ML
10 INJECTION INTRAVENOUS
Status: COMPLETED | OUTPATIENT
Start: 2018-05-10 | End: 2018-05-10

## 2018-05-10 RX ORDER — IBUPROFEN 200 MG
200 TABLET ORAL DAILY PRN
COMMUNITY

## 2018-05-10 RX ORDER — LOPERAMIDE HYDROCHLORIDE 2 MG/1
2 CAPSULE ORAL 4 TIMES DAILY PRN
Status: ON HOLD | COMMUNITY
End: 2018-05-24 | Stop reason: HOSPADM

## 2018-05-10 RX ORDER — GUAIFENESIN 100 MG/5ML
200 SOLUTION ORAL
Status: ON HOLD | COMMUNITY
End: 2018-05-24 | Stop reason: HOSPADM

## 2018-05-10 RX ORDER — LORAZEPAM 2 MG/ML
1 INJECTION INTRAMUSCULAR EVERY 4 HOURS PRN
Status: DISCONTINUED | OUTPATIENT
Start: 2018-05-10 | End: 2018-05-21

## 2018-05-10 RX ORDER — FUROSEMIDE 40 MG/1
40 TABLET ORAL DAILY
Status: ON HOLD | COMMUNITY
End: 2018-05-24 | Stop reason: HOSPADM

## 2018-05-10 RX ADMIN — POTASSIUM CHLORIDE 10 MEQ: 7.46 INJECTION, SOLUTION INTRAVENOUS at 12:05

## 2018-05-10 RX ADMIN — MAGNESIUM SULFATE IN WATER 2 G: 40 INJECTION, SOLUTION INTRAVENOUS at 08:05

## 2018-05-10 RX ADMIN — PIPERACILLIN AND TAZOBACTAM 4.5 G: 4; .5 INJECTION, POWDER, LYOPHILIZED, FOR SOLUTION INTRAVENOUS; PARENTERAL at 05:05

## 2018-05-10 RX ADMIN — THIAMINE HYDROCHLORIDE 100 MG: 100 INJECTION, SOLUTION INTRAMUSCULAR; INTRAVENOUS at 08:05

## 2018-05-10 RX ADMIN — IPRATROPIUM BROMIDE AND ALBUTEROL SULFATE 3 ML: .5; 3 SOLUTION RESPIRATORY (INHALATION) at 07:05

## 2018-05-10 RX ADMIN — IPRATROPIUM BROMIDE AND ALBUTEROL SULFATE 3 ML: .5; 3 SOLUTION RESPIRATORY (INHALATION) at 12:05

## 2018-05-10 RX ADMIN — IPRATROPIUM BROMIDE AND ALBUTEROL SULFATE 3 ML: .5; 3 SOLUTION RESPIRATORY (INHALATION) at 08:05

## 2018-05-10 RX ADMIN — IOHEXOL 100 ML: 350 INJECTION, SOLUTION INTRAVENOUS at 06:05

## 2018-05-10 RX ADMIN — PIPERACILLIN AND TAZOBACTAM 4.5 G: 4; .5 INJECTION, POWDER, LYOPHILIZED, FOR SOLUTION INTRAVENOUS; PARENTERAL at 09:05

## 2018-05-10 RX ADMIN — PIPERACILLIN AND TAZOBACTAM 4.5 G: 4; .5 INJECTION, POWDER, LYOPHILIZED, FOR SOLUTION INTRAVENOUS; PARENTERAL at 12:05

## 2018-05-10 RX ADMIN — DEXTROSE MONOHYDRATE AND SODIUM CHLORIDE: 5; .45 INJECTION, SOLUTION INTRAVENOUS at 06:05

## 2018-05-10 RX ADMIN — POTASSIUM CHLORIDE 10 MEQ: 7.46 INJECTION, SOLUTION INTRAVENOUS at 11:05

## 2018-05-10 RX ADMIN — POTASSIUM CHLORIDE 10 MEQ: 7.46 INJECTION, SOLUTION INTRAVENOUS at 10:05

## 2018-05-10 RX ADMIN — VANCOMYCIN HYDROCHLORIDE 1 G: 10 INJECTION, POWDER, LYOPHILIZED, FOR SOLUTION INTRAVENOUS at 12:05

## 2018-05-10 RX ADMIN — FOLIC ACID 1 MG: 5 INJECTION, SOLUTION INTRAMUSCULAR; INTRAVENOUS; SUBCUTANEOUS at 08:05

## 2018-05-10 RX ADMIN — POTASSIUM CHLORIDE 10 MEQ: 7.46 INJECTION, SOLUTION INTRAVENOUS at 08:05

## 2018-05-10 NOTE — ASSESSMENT & PLAN NOTE
At OSH:  - WBC elevated up to 38K  - CXR with bilateral multifocal infiltration (more pronounced in RML)  - Most likely due to aspiration secondary to AMS     On arrival to C:  - afebrile, mildly tachypneic, with scattered rhonchi and wheezing on exam  - Repeat CXR: with interval worsening of infiltration/consolidation in RML  - repeat WBC 20K (90% PMN), procalcitonin -> 9.9  - obtained influenza Ag -> negative    - covered broadly with vancomycin + zosyn + azithromycin  - continued on mechanical ventilation   - extubated on 5/6  to 3-4L NC, increased work of breathing and rhonchi on exam overnight  - interval worsening of patchy infiltrations on CXR suggestive of developing ARDS vs pulmonary edema due to IVF  - patient mentioning Hx of TB in 2001 which was treated, confirmed with the sister, sputum sent for AFB  - respiratory cultures -> rare GPC, 1/2 blood cx growing GCP resembling staph     5/9:  - AFB pending  - Legionella Ag negative  - will continue broad spectrum antibiotics, will get vancomycin trough in the am   - received lasix 60 mg IV x3 so far, net -2.9L over the last 24 hr, hold further diuresis  - currently on Bipap will wean as tolerated

## 2018-05-10 NOTE — HPI
The patient is a 64 y/o M new to our system, with HTN, COPD and alcoholism, who is transfer from [facility] ICU to [facility] on mechanical ventilation.     The patient lives with his brother across the street from his sister. He was found unresponsive in a chair with small amount of blood in his mouth by his nephew and taken to the hospital by EMS on 5/3. Patient drinks every day and has been having frequent falls recently. Per sister he has quit smoking 15 years ago.  In the ED at OSH he was found to be hypoxic and hypotensive, with undetectable EtOH levels, leukocytosis (24K) and hyponatremia (119) in the labs. he received narcan x1, IV fluids, started on metronidazole and ceftriaxone (also x1 of zosyn, azithromycin, vancomycin, ceftaroline) for aspiration pneumonia, and was put on 15L of Oxygen with non-rebreather, which improved his mental status and BP, however on 4/5 upon weaning off of oxygen he developed hypoxic hypercapnic respiratory failure and subsequent acidosis (pH 7.10), therefore he was intubated and transferred to the ICU. Patient arrived to [facility] on dopamine gtt which per notes was started to maintain BP in the setting of hyponatremia.

## 2018-05-10 NOTE — PLAN OF CARE
Problem: Patient Care Overview  Goal: Plan of Care Review  Dx: Pneumonia   Hx: HTN, COPD and alcoholism, who is a transfer from [facility] ICU to [facility] on mechanical ventilation after being found unresponsive at home    4/5: Transferred to [facility] on mechanical ventilation. Dopamine gtt off.   4/6: Pt extubated  5/7: Echo 50-55%         RN  MAP>65    *Pt likes [employer] channel [employer]        Outcome: Ongoing (interventions implemented as appropriate)  Pt AAOX4 with periods of confusion but becomes oriented x 4 a few minutes after confustion. VSS; see pervious note for more detail. after the 1 time dose of furosemide pt's PO2 sat was above 90% through the rest of the night. No c/o pain during shift. Pt is NPO; kept requesting water, ice water mouth swabs was given instead. Prn lorazepam was given once because of Pt's SBP greater than 160 and HR greater than 100. Pt safety maintained. Hourly rounding performed. Call light within reach. [facility]       05/09/18 1923   Vital Signs   BP (!) 165/85   MAP (mmHg) 118

## 2018-05-10 NOTE — PT/OT/SLP PROGRESS
Speech Language Pathology  Attempted    Ryder Boo  MRN: [id number]    Patient not seen today secondary to Unavailable (Comment), Other (Comment) (pt is on Bipap at this time and is not appropriate for po trials). Will follow-up at a later date and time as pt is able to participate.  Thank you.    Andra Jones, SANDRA-SLP   5/10/2018

## 2018-05-10 NOTE — ASSESSMENT & PLAN NOTE
- Likely secondary to sepsis and event that caused him to be found down  - after talking to sister does not know how long he was found down, she does state that he drank heavily before that   - waxing and waning but improving   - Will continue to monitor no focal deficits

## 2018-05-10 NOTE — SUBJECTIVE & OBJECTIVE
Interval History: Patient with worsening respiratory needs overnight. Changed him from high flow to Bipap and he did well. I talked to his sister about his past medical history and she states he was functional and performs all ADLs independently and has no problems with speech or swallowing.     Review of Systems   Constitutional: Negative for chills and fever.   HENT: Positive for trouble swallowing. Negative for congestion and sore throat.    Respiratory: Negative for chest tightness and shortness of breath.    Cardiovascular: Negative for chest pain, palpitations and leg swelling.   Gastrointestinal: Negative for abdominal pain, constipation, diarrhea, nausea and vomiting.   Genitourinary: Negative for difficulty urinating, frequency and hematuria.   Musculoskeletal: Negative for arthralgias and myalgias.   Skin: Negative for rash and wound.   Neurological: Negative for tremors, seizures, weakness and headaches.   Psychiatric/Behavioral: Negative for agitation and confusion. The patient is not nervous/anxious.      Objective:     Vital Signs (Most Recent):  Temp: 97.8 °F (36.6 °C) (05/10/18 1123)  Pulse: 102 (05/10/18 1240)  Resp: 20 (05/10/18 1240)  BP: (!) 145/86 (05/10/18 1123)  SpO2: 99 % (05/10/18 1240) Vital Signs (24h Range):  Temp:  [97.3 °F (36.3 °C)-98.8 °F (37.1 °C)] 97.8 °F (36.6 °C)  Pulse:  [] 102  Resp:  [18-22] 20  SpO2:  [87 %-99 %] 99 %  BP: (136-165)/(83-87) 145/86     Weight: 80.6 kg (177 lb 11.1 oz)  Body mass index is 28.68 kg/m².    Intake/Output Summary (Last 24 hours) at 05/10/18 1518  Last data filed at 05/10/18 0200   Gross per 24 hour   Intake                0 ml   Output              650 ml   Net             -650 ml      Physical Exam   Constitutional: He is oriented to person, place, and time. He appears well-developed and well-nourished. He is cooperative. No distress. Nasal cannula in place.   Alert, oriented to person and place.   HENT:   Head: Normocephalic and atraumatic.    Mouth/Throat: Mucous membranes are dry.   Eyes: Conjunctivae and EOM are normal. Pupils are equal, round, and reactive to light.   Neck: Normal range of motion.   Cardiovascular: Regular rhythm and normal heart sounds.  Tachycardia present.  Exam reveals no gallop.    No murmur heard.  Pulses:       Radial pulses are 2+ on the right side, and 2+ on the left side.   Pulmonary/Chest: No accessory muscle usage. No tachypnea. No respiratory distress. He has no decreased breath sounds. He has no wheezes. He has rhonchi (scattered (R>L)). He has no rales.   Abdominal: Soft. Bowel sounds are normal. He exhibits no distension. There is no tenderness. There is no rigidity, no rebound and no guarding.   Musculoskeletal: Normal range of motion. He exhibits no edema or tenderness.   Neurological: He is oriented to person, place, and time. He displays no tremor. He displays no seizure activity.   Skin: Skin is warm. Capillary refill takes less than 2 seconds. No rash noted. He is not diaphoretic. No cyanosis or erythema.   Psychiatric: He has a normal mood and affect. His speech is normal and behavior is normal.   Nursing note and vitals reviewed.      Significant Labs:   CBC:   Recent Labs  Lab 05/09/18  0313 05/10/18  0552   WBC 9.66 11.75   HGB 9.3* 10.9*   HCT 28.1* 34.8*    321     CMP:   Recent Labs  Lab 05/08/18  1809 05/09/18  0313 05/10/18  0552    142 148*   K 3.4* 3.2* 3.1*   CL 94* 89* 88*   CO2 36* 42* 43*    142* 101   BUN 35* 34* 34*   CREATININE 1.3 1.3 1.3   CALCIUM 9.3 8.8 9.5   PROT  --  7.0 7.7   ALBUMIN  --  1.7* 1.9*   BILITOT  --  0.4 0.4   ALKPHOS  --  57 58   AST  --  15 16   ALT  --  8* 8*   ANIONGAP 12 11 17*   EGFRNONAA 58.5* 58.5* 58.5*     All pertinent labs within the past 24 hours have been reviewed.    Significant Imaging: I have reviewed and interpreted all pertinent imaging results/findings within the past 24 hours.

## 2018-05-10 NOTE — PT/OT/SLP PROGRESS
Occupational Therapy   Treatment    Name: Ryder Boo  MRN: [id number]  Admitting Diagnosis:  Aspiration pneumonia       Recommendations:     Discharge Recommendations: nursing facility, skilled  Discharge Equipment Recommendations:     Barriers to discharge:       Subjective     Communicated with: RN prior to session.  Pain/Comfort:  · Pain Rating 1: 0/10    Patients cultural, spiritual, Christianity conflicts given the current situation: none reported    Objective:     Patient found with: BIPAP, hess catheter, peripheral IV    General Precautions: Standard, fall   Orthopedic Precautions:    Braces:       Occupational Performance:    Bed Mobility:    · Patient completed Rolling/Turning to Right with minimum assistance  · Patient completed Scooting/Bridging with maximal assistance  · Patient completed Supine to Sit with minimum assistance  · Patient completed Sit to Supine with moderate assistance     Functional Mobility/Transfers:  · Patient completed Sit <> Stand Transfer with moderate assistance and maximal assistance  with  no assistive device  (x 2 trials)    Patient left supine with all lines intact and call button in reach    [facility] 6 Click:  [facility] Total Score: 11    Treatment & Education:  Pt sat EOB ~ 18 mins with SBA/CGA 2/2 right lean and pt able to correct at times with cueing.   Performed BUE/LE therex sitting EOB with occasional LOB when moving both UEs.  Worked on reaching across midline with his RUE with success to correct right leaning and to force pt to reach with leaning past his JOHN.  Sit>stand with Mod-Max A.  Education:    Assessment:     Ryder Boo is a 63 y.o. male with a medical diagnosis of Aspiration pneumonia.  He presents with continued overall weakness and decreased endurance but showed very good improvement with bed mobility and EOB sitting time. Continue OT to facilitate further increased functional (I) and to progress with mobility. Performance deficits affecting function are  weakness, gait instability, decreased upper extremity function, impaired balance, impaired endurance, decreased lower extremity function, decreased coordination, impaired functional mobilty, impaired self care skills, decreased safety awareness.      Rehab Prognosis:  Good; patient would benefit from acute skilled OT services to address these deficits and reach maximum level of function.       Plan:     Patient to be seen 4 x/week to address the above listed problems via self-care/home management, therapeutic activities, therapeutic exercises, neuromuscular re-education  · Plan of Care Expires: 06/07/18  · Plan of Care Reviewed with: patient    This Plan of care has been discussed with the patient who was involved in its development and understands and is in agreement with the identified goals and treatment plan    GOALS:    Occupational Therapy Goals        Problem: Occupational Therapy Goal    Goal Priority Disciplines Outcome Interventions   Occupational Therapy Goal     OT, PT/OT Ongoing (interventions implemented as appropriate)    Description:  Goals to be met by: 5/22/18     Patient will increase functional independence with ADLs by performing:    Feeding with Supervision.  UE Dressing with Floyd and Minimal Assistance.  LE Dressing with Floyd and Moderate Assistance.  Grooming while standing with Contact Guard Assistance.  Toileting from bedside commode with Moderate Assistance for hygiene and clothing management.   Toilet transfer to bedside commode with Minimal Assistance .                      Time Tracking:     OT Date of Treatment: 05/10/18  OT Start Time: 1255  OT Stop Time: 1320  OT Total Time (min): 25 min    Billable Minutes:Therapeutic Activity 13  Neuromuscular Re-education 12    FRANCA Tavarez  5/10/2018

## 2018-05-10 NOTE — PLAN OF CARE
Problem: Occupational Therapy Goal  Goal: Occupational Therapy Goal  Goals to be met by: 5/22/18     Patient will increase functional independence with ADLs by performing:    Feeding with Supervision.  UE Dressing with Heath and Minimal Assistance.  LE Dressing with Heath and Moderate Assistance.  Grooming while standing with Contact Guard Assistance.  Toileting from bedside commode with Moderate Assistance for hygiene and clothing management.   Toilet transfer to bedside commode with Minimal Assistance .     Outcome: Ongoing (interventions implemented as appropriate)  Con't POC.    FRANCA Tavarez

## 2018-05-10 NOTE — NURSING
Pt had trouble maintaining SPO2 of >92 on FIO2 50% and LPM: 22. Pt's SPO2 dropped lowest at 86%. MED team T was paged and Dr. Willis Dhillon came and saw the pt. One time dose of Furosamide was ordered and Dr. Dhillon ordered to keep pt's SPO2 above 88%. Pt's SPO2 is currently at 95%. TM.

## 2018-05-10 NOTE — ASSESSMENT & PLAN NOTE
At OSH:  - Lactic acid 4.1 -> 2.5 -> 1.6  - WBC 24K -> 38K   - U/a negative  - CXR with evidence of RML consolidation  Upon arrival to [facility] [place]  - afebrile, RR 28-33 on ventilator, BP 82/54  - WBC 20, lactic acid 1.6, procal 9.9  - qSOFA 1/3, SIRS 2/4  - received NS bolus 1.5L , MAPs>65, no need for pressors, remained afebrile  - 1/2 Blood cx growing GPC resembling staph, respiratory cx rare GPC  - CXR -> interval worsening of infiltrations     5/10:  - BP and HR WNL, afebrile  - WBC down t  - will continue on zosyn and vanc.   - will continue on broad spectrum abx and await speciation of cultures  - Will try him on Bipap and high flow alternating

## 2018-05-10 NOTE — PLAN OF CARE
MARK unable to effectively communicate w/ pt d/t BiPap. MARK contacted pt's sister, Sejal Starkey p [phone] to d/c pt's d/c plans. Sejal will call CM back w/ fax # to forward a list as she had to cut short conversation d/t an MD appt. Sejal is very concerned about pt's black stools - MARK relayed to her that an Occult blood stool test was ordered yesterday but not completed and will relay info to [facility] team.    Update: MARK received callback from Sejal wants the SNF list emailed to her [email or web address].      05/10/18 1252   Discharge Reassessment   Assessment Type Discharge Planning Reassessment   Provided patient/caregiver education on the expected discharge date and the discharge plan Yes   Do you have any problems affording any of your prescribed medications? TBD   Discharge Plan A Skilled Nursing Facility   Discharge Plan B Home with family;Home Health   Patient choice form signed by patient/caregiver N/A   Can the patient answer the patient profile reliably? No historian available  (pt w/ BiPAP in place)   How does the patient rate their overall health at the present time? Fair   Describe the patient's ability to walk at the present time. Major restrictions/daily assistance from another person   How often would a person be available to care for the patient? Occasionally   Number of comorbid conditions (as recorded on the chart) Three

## 2018-05-10 NOTE — ASSESSMENT & PLAN NOTE
- Patient with improving h/h will continue to monitor  - I spoke to sister who states that before admission he was suppose to go for colonoscopy   - If he requires further intervention we will order no signs of bleed right now

## 2018-05-10 NOTE — PROGRESS NOTES
[facility] Hospital Medicine  Progress Note    Patient Name: Ryder Boo  MRN: [id number]  Patient Class: IP- Inpatient   Admission Date: 5/6/2018  Length of Stay: 4 days  Attending Physician: Silver Mcfarland MD  Primary Care Provider: Swapnil Heart MD    [facility]: [facility] HOSP MED 4 Mariaelena Luna MD    Subjective:     Principal Problem:Sepsis    HPI:  The patient is a 64 y/o M new to our system, with HTN, COPD and alcoholism, who is transfer from [facility] ICU to [facility] on mechanical ventilation.     The patient lives with his brother across the street from his sister. He was found unresponsive in a chair with small amount of blood in his mouth by his nephew and taken to the hospital by EMS on 5/3. Patient drinks every day and has been having frequent falls recently. Per sister he has quit smoking 15 years ago.  In the ED at OSH he was found to be hypoxic and hypotensive, with undetectable EtOH levels, leukocytosis (24K) and hyponatremia (119) in the labs. he received narcan x1, IV fluids, started on metronidazole and ceftriaxone (also x1 of zosyn, azithromycin, vancomycin, ceftaroline) for aspiration pneumonia, and was put on 15L of Oxygen with non-rebreather, which improved his mental status and BP, however on 4/5 upon weaning off of oxygen he developed hypoxic hypercapnic respiratory failure and subsequent acidosis (pH 7.10), therefore he was intubated and transferred to the ICU. Patient arrived to [facility] on dopamine gtt which per notes was started to maintain BP in the setting of hyponatremia.     Hospital Course:  5/6: upon arrival, patient was taken off of dopamine gtt and received 1.5L IV fluids to maintain MAPs. Continued on vanc/zosyn/azithromycin. Hyponatremia and ROHITH continued to improved with IVF. He was extubated in early afternoon and maintained normal O2 saturations on 3-4L oxygen on NC.   5/7: early morning he was noticed to be rattling while breathing by the  nurse. Upon examination he had increased work of breathing with worsening of bilateral rhonchi. Repeat CXR revealed interval worsening of the patchy infiltrations. Received x1 furosemide 60 mg IV and transitioned to high flow oxygen for possible development of ARDS, he is currently maintaining normal saturations, will keep on intubation watch.  5/8: several episodes of desaturation down to 70% overnight and early morning, that responded well to increasing oxygen to 100%, currently on 35L/50%, mildly drowsy due to precedex. Responded well to diuretics, net -2.8L. Will discontinue precedex and will have speech evaluate his swallowing.  5/9: Uneventful night off precedex. Remains confused, oriented to person and place. Oxygen requirements greatly improved 15L/50% with plan to wean to low-flow NC today. Diuresed -2.9L yesterday and discontinued lasix. Discontinued azithromycin and continued on zosyn. Failed speech eval by SLP and remains NPO    Interval History: Patient with worsening respiratory needs overnight. Changed him from high flow to Bipap and he did well. I talked to his sister about his past medical history and she states he was functional and performs all ADLs independently and has no problems with speech or swallowing.     Review of Systems   Constitutional: Negative for chills and fever.   HENT: Positive for trouble swallowing. Negative for congestion and sore throat.    Respiratory: Negative for chest tightness and shortness of breath.    Cardiovascular: Negative for chest pain, palpitations and leg swelling.   Gastrointestinal: Negative for abdominal pain, constipation, diarrhea, nausea and vomiting.   Genitourinary: Negative for difficulty urinating, frequency and hematuria.   Musculoskeletal: Negative for arthralgias and myalgias.   Skin: Negative for rash and wound.   Neurological: Negative for tremors, seizures, weakness and headaches.   Psychiatric/Behavioral: Negative for agitation and confusion.  The patient is not nervous/anxious.      Objective:     Vital Signs (Most Recent):  Temp: 97.8 °F (36.6 °C) (05/10/18 1123)  Pulse: 102 (05/10/18 1240)  Resp: 20 (05/10/18 1240)  BP: (!) 145/86 (05/10/18 1123)  SpO2: 99 % (05/10/18 1240) Vital Signs (24h Range):  Temp:  [97.3 °F (36.3 °C)-98.8 °F (37.1 °C)] 97.8 °F (36.6 °C)  Pulse:  [] 102  Resp:  [18-22] 20  SpO2:  [87 %-99 %] 99 %  BP: (136-165)/(83-87) 145/86     Weight: 80.6 kg (177 lb 11.1 oz)  Body mass index is 28.68 kg/m².    Intake/Output Summary (Last 24 hours) at 05/10/18 1518  Last data filed at 05/10/18 0200   Gross per 24 hour   Intake                0 ml   Output              650 ml   Net             -650 ml      Physical Exam   Constitutional: He is oriented to person, place, and time. He appears well-developed and well-nourished. He is cooperative. No distress. Nasal cannula in place.   Alert, oriented to person and place.   HENT:   Head: Normocephalic and atraumatic.   Mouth/Throat: Mucous membranes are dry.   Eyes: Conjunctivae and EOM are normal. Pupils are equal, round, and reactive to light.   Neck: Normal range of motion.   Cardiovascular: Regular rhythm and normal heart sounds.  Tachycardia present.  Exam reveals no gallop.    No murmur heard.  Pulses:       Radial pulses are 2+ on the right side, and 2+ on the left side.   Pulmonary/Chest: No accessory muscle usage. No tachypnea. No respiratory distress. He has no decreased breath sounds. He has no wheezes. He has rhonchi (scattered (R>L)). He has no rales.   Abdominal: Soft. Bowel sounds are normal. He exhibits no distension. There is no tenderness. There is no rigidity, no rebound and no guarding.   Musculoskeletal: Normal range of motion. He exhibits no edema or tenderness.   Neurological: He is oriented to person, place, and time. He displays no tremor. He displays no seizure activity.   Skin: Skin is warm. Capillary refill takes less than 2 seconds. No rash noted. He is not  diaphoretic. No cyanosis or erythema.   Psychiatric: He has a normal mood and affect. His speech is normal and behavior is normal.   Nursing note and vitals reviewed.      Significant Labs:   CBC:   Recent Labs  Lab 05/09/18  0313 05/10/18  0552   WBC 9.66 11.75   HGB 9.3* 10.9*   HCT 28.1* 34.8*    321     CMP:   Recent Labs  Lab 05/08/18  1809 05/09/18  0313 05/10/18  0552    142 148*   K 3.4* 3.2* 3.1*   CL 94* 89* 88*   CO2 36* 42* 43*    142* 101   BUN 35* 34* 34*   CREATININE 1.3 1.3 1.3   CALCIUM 9.3 8.8 9.5   PROT  --  7.0 7.7   ALBUMIN  --  1.7* 1.9*   BILITOT  --  0.4 0.4   ALKPHOS  --  57 58   AST  --  15 16   ALT  --  8* 8*   ANIONGAP 12 11 17*   EGFRNONAA 58.5* 58.5* 58.5*     All pertinent labs within the past 24 hours have been reviewed.    Significant Imaging: I have reviewed and interpreted all pertinent imaging results/findings within the past 24 hours.    Assessment/Plan:      * Sepsis      At OSH:  - Lactic acid 4.1 -> 2.5 -> 1.6  - WBC 24K -> 38K   - U/a negative  - CXR with evidence of RML consolidation  Upon arrival to C  - afebrile, RR 28-33 on ventilator, BP 82/54  - WBC 20, lactic acid 1.6, procal 9.9  - qSOFA 1/3, SIRS 2/4  - received NS bolus 1.5L , MAPs>65, no need for pressors, remained afebrile  - 1/2 Blood cx growing GPC resembling staph, respiratory cx rare GPC  - CXR -> interval worsening of infiltrations     5/10:  - BP and HR WNL, afebrile  - WBC down t  - will continue on zosyn and vanc.   - will continue on broad spectrum abx and await speciation of cultures  - Will try him on Bipap and high flow alternating            Hypomagnesemia    - repleting daily           Hypokalemia    - repleting daily           Anemia    - Patient with improving h/h will continue to monitor  - I spoke to sister who states that before admission he was suppose to go for colonoscopy   - If he requires further intervention we will order no signs of bleed right now            Encephalopathy, metabolic    - Likely secondary to sepsis and event that caused him to be found down  - after talking to sister does not know how long he was found down, she does state that he drank heavily before that   - waxing and waning but improving   - Will continue to monitor no focal deficits           Acute respiratory failure with hypoxia and hypercapnia      Former smoker with Hx of COPD, overlapped with volume overload due to acute decompensated HF  2+ pitting edema on lower extremities + bilateral rhonchi and wheezing on the exam     PFT on 5/31/2017 at OSH:               Moderate obstructive + mild restrictive pattern                FEV1/FVC 60%  2D echo on 2/2018 at OSH:                      EF 55-60%              Normal LA pressure              mild TVR  BNP on 5/4: 349     - Repeat CXR: fibrotic changes in DIAMANTE + patchy infiltration specially in RML   - ECG sinus tachycardia, Troponin normal, repeat   - extubated on 5/6 -> increased work of breathing overnight => transitioned to high flow for possible development of ARDS  - diuresed with lasix 60 mg x3 so far, with good response (net -2.6L since admit). Holding further diureses  - ABG on 5/8 with pH WNL, ABG today with pH WNL and ABG today with hypercapnia   - PT/OT evaluation, he was able to participate today.   - On Bipap . Weaning as tolerated  - Will rule out PE with CTA, as patient is not improving now with his respiratory status         Alcoholism /alcohol abuse       In initial labs at OSH:  EtOH undetectable, urine tox negative      - currently not agitated, no evidence of withdrawal or hepatic encephalopathy on exam  - LFTs within normal limits  -  on thiamine and folic acid daily  - will monitor the electrolytes and replace as needed  - Continue speech eval for swallowing                ROHITH (acute kidney injury)    - improving           Aspiration pneumonia       At OSH:  - WBC elevated up to 38K  - CXR with bilateral multifocal  infiltration (more pronounced in RML)  - Most likely due to aspiration secondary to AMS     On arrival to C:  - afebrile, mildly tachypneic, with scattered rhonchi and wheezing on exam  - Repeat CXR: with interval worsening of infiltration/consolidation in RML  - repeat WBC 20K (90% PMN), procalcitonin -> 9.9  - obtained influenza Ag -> negative    - covered broadly with vancomycin + zosyn + azithromycin  - continued on mechanical ventilation   - extubated on 5/6  to 3-4L NC, increased work of breathing and rhonchi on exam overnight  - interval worsening of patchy infiltrations on CXR suggestive of developing ARDS vs pulmonary edema due to IVF  - patient mentioning Hx of TB in 2001 which was treated, confirmed with the sister, sputum sent for AFB  - respiratory cultures -> rare GPC, 1/2 blood cx growing GCP resembling staph     5/9:  - AFB pending  - Legionella Ag negative  - will continue broad spectrum antibiotics, will get vancomycin trough in the am   - received lasix 60 mg IV x3 so far, net -2.9L over the last 24 hr, hold further diuresis  - currently on Bipap will wean as tolerated                VTE Risk Mitigation         Ordered     enoxaparin injection 40 mg  Daily      05/06/18 1016     Place sequential compression device  Until discontinued      05/06/18 0113     IP VTE LOW RISK PATIENT  Once      05/06/18 0113              Mariaelena Luna MD  Department of Hospital Medicine   [facility]

## 2018-05-10 NOTE — ASSESSMENT & PLAN NOTE
Former smoker with Hx of COPD, overlapped with volume overload due to acute decompensated HF  2+ pitting edema on lower extremities + bilateral rhonchi and wheezing on the exam     PFT on 5/31/2017 at OSH:               Moderate obstructive + mild restrictive pattern                FEV1/FVC 60%  2D echo on 2/2018 at OSH:                      EF 55-60%              Normal LA pressure              mild TVR  BNP on 5/4: 349     - Repeat CXR: fibrotic changes in DIAMANTE + patchy infiltration specially in RML   - ECG sinus tachycardia, Troponin normal, repeat   - extubated on 5/6 -> increased work of breathing overnight => transitioned to high flow for possible development of ARDS  - diuresed with lasix 60 mg x3 so far, with good response (net -2.6L since admit). Holding further diureses  - ABG on 5/8 with pH WNL, ABG today with pH WNL and ABG today with hypercapnia   - PT/OT evaluation, he was able to participate today.   - On Bipap . Weaning as tolerated  - Will rule out PE with CTA, as patient is not improving now with his respiratory status

## 2018-05-10 NOTE — ASSESSMENT & PLAN NOTE
In initial labs at OSH:  EtOH undetectable, urine tox negative      - currently not agitated, no evidence of withdrawal or hepatic encephalopathy on exam  - LFTs within normal limits  -  on thiamine and folic acid daily  - will monitor the electrolytes and replace as needed  - Continue speech eval for swallowing

## 2018-05-10 NOTE — HOSPITAL COURSE
5/6: upon arrival, patient was taken off of dopamine gtt and received 1.5L IV fluids to maintain MAPs. Continued on vanc/zosyn/azithromycin. Hyponatremia and ROHITH continued to improved with IVF. He was extubated in early afternoon and maintained normal O2 saturations on 3-4L oxygen on NC.   5/7: early morning he was noticed to be rattling while breathing by the nurse. Upon examination he had increased work of breathing with worsening of bilateral rhonchi. Repeat CXR revealed interval worsening of the patchy infiltrations. Received x1 furosemide 60 mg IV and transitioned to high flow oxygen for possible development of ARDS, he is currently maintaining normal saturations, will keep on intubation watch.  5/8: several episodes of desaturation down to 70% overnight and early morning, that responded well to increasing oxygen to 100%, currently on 35L/50%, mildly drowsy due to precedex. Responded well to diuretics, net -2.8L. Will discontinue precedex and will have speech evaluate his swallowing.  5/9: Uneventful night off precedex. Remains confused, oriented to person and place. Oxygen requirements greatly improved 15L/50% with plan to wean to low-flow NC today. Diuresed -2.9L yesterday and discontinued lasix. Discontinued azithromycin and continued on zosyn. Failed speech eval by SLP and remains NPO  5/11-5/12 patient had NG tube placed, removed it and re-ordered on gentle tube feeds. On high o2 support at night as patient cannot have bipap due to NG.  05/14/2018 -Patient passed swallow eval to full liquids. He is now sating 100 % RA family updated awaiting rehab evaluation   05/17/2018 Patient was desating in the morning to 85-88 but he was alert oriented and no complaints gave him a breathing treatment   05/18/2018 awaiting placement  05/19/2018 CT, requested by rehab to r/o stroke, shows mild signs of possible NPH. Will order f/u o/p neuro. Patient reports x3 BMs, will see if continues. Will complete 1 more day of  moxi.   05/20/2018 no acute events overnight patient on last day of moxi  Subsequently  Patient has been awaiting placement, added chest physiotherapy, deep suction, and acupella for chest congestion; also for upper airway congestion, added flonase and mucinex; procal negative for pna, cxr 5/28 with some superimpose edema, speech re-eval for aspiration, okay for dental soft diet. Given one dose of 20mg IV lasix

## 2018-05-11 PROBLEM — E44.0 MODERATE MALNUTRITION: Status: ACTIVE | Noted: 2018-05-11

## 2018-05-11 LAB
ALBUMIN SERPL BCP-MCNC: 1.8 G/DL
ALLENS TEST: ABNORMAL
ALP SERPL-CCNC: 49 U/L
ALT SERPL W/O P-5'-P-CCNC: 7 U/L
ANION GAP SERPL CALC-SCNC: 11 MMOL/L
AST SERPL-CCNC: 17 U/L
BACTERIA BLD CULT: NORMAL
BASOPHILS # BLD AUTO: 0.02 K/UL
BASOPHILS NFR BLD: 0.2 %
BILIRUB SERPL-MCNC: 0.3 MG/DL
BUN SERPL-MCNC: 32 MG/DL
CALCIUM SERPL-MCNC: 9.2 MG/DL
CHLORIDE SERPL-SCNC: 94 MMOL/L
CO2 SERPL-SCNC: 44 MMOL/L
CREAT SERPL-MCNC: 1.2 MG/DL
DIFFERENTIAL METHOD: ABNORMAL
EOSINOPHIL # BLD AUTO: 0.1 K/UL
EOSINOPHIL NFR BLD: 0.5 %
ERYTHROCYTE [DISTWIDTH] IN BLOOD BY AUTOMATED COUNT: 13.4 %
EST. GFR  (AFRICAN AMERICAN): >60 ML/MIN/1.73 M^2
EST. GFR  (NON AFRICAN AMERICAN): >60 ML/MIN/1.73 M^2
GLUCOSE SERPL-MCNC: 131 MG/DL
HCO3 UR-SCNC: 56.8 MMOL/L (ref 24–28)
HCT VFR BLD AUTO: 30.7 %
HGB BLD-MCNC: 9.6 G/DL
IMM GRANULOCYTES # BLD AUTO: 0.16 K/UL
IMM GRANULOCYTES NFR BLD AUTO: 1.5 %
LYMPHOCYTES # BLD AUTO: 0.7 K/UL
LYMPHOCYTES NFR BLD: 6.8 %
MAGNESIUM SERPL-MCNC: 1.8 MG/DL
MCH RBC QN AUTO: 29.7 PG
MCHC RBC AUTO-ENTMCNC: 31.3 G/DL
MCV RBC AUTO: 95 FL
MITOGEN NIL: 1.44 IU/ML
MONOCYTES # BLD AUTO: 0.7 K/UL
MONOCYTES NFR BLD: 6.9 %
NEUTROPHILS # BLD AUTO: 9 K/UL
NEUTROPHILS NFR BLD: 84.1 %
NIL: 0.02 IU/ML
NRBC BLD-RTO: 0 /100 WBC
OB PNL STL: POSITIVE
PCO2 BLDA: 73.2 MMHG (ref 35–45)
PH SMN: 7.5 [PH] (ref 7.35–7.45)
PLATELET # BLD AUTO: 327 K/UL
PMV BLD AUTO: 9.9 FL
PO2 BLDA: 149 MMHG (ref 80–100)
POC BE: >30 MMOL/L
POC SATURATED O2: 99 % (ref 95–100)
POC TCO2: >50 MMOL/L (ref 23–27)
POTASSIUM SERPL-SCNC: 3.2 MMOL/L
PROT SERPL-MCNC: 7.2 G/DL
RBC # BLD AUTO: 3.23 M/UL
SAMPLE: ABNORMAL
SITE: ABNORMAL
SODIUM SERPL-SCNC: 149 MMOL/L
TB ANTIGEN NIL: 0.03 IU/ML
TB ANTIGEN: 0.05 IU/ML
TB GOLD: NEGATIVE
WBC # BLD AUTO: 10.68 K/UL

## 2018-05-11 PROCEDURE — 25000003 PHARM REV CODE 250: Performed by: STUDENT IN AN ORGANIZED HEALTH CARE EDUCATION/TRAINING PROGRAM

## 2018-05-11 PROCEDURE — 63600175 PHARM REV CODE 636 W HCPCS: Performed by: STUDENT IN AN ORGANIZED HEALTH CARE EDUCATION/TRAINING PROGRAM

## 2018-05-11 PROCEDURE — 63600175 PHARM REV CODE 636 W HCPCS: Performed by: INTERNAL MEDICINE

## 2018-05-11 PROCEDURE — 85025 COMPLETE CBC W/AUTO DIFF WBC: CPT

## 2018-05-11 PROCEDURE — 92526 ORAL FUNCTION THERAPY: CPT

## 2018-05-11 PROCEDURE — 97530 THERAPEUTIC ACTIVITIES: CPT

## 2018-05-11 PROCEDURE — 94668 MNPJ CHEST WALL SBSQ: CPT

## 2018-05-11 PROCEDURE — 94640 AIRWAY INHALATION TREATMENT: CPT

## 2018-05-11 PROCEDURE — S5010 5% DEXTROSE AND 0.45% SALINE: HCPCS | Performed by: STUDENT IN AN ORGANIZED HEALTH CARE EDUCATION/TRAINING PROGRAM

## 2018-05-11 PROCEDURE — 83735 ASSAY OF MAGNESIUM: CPT

## 2018-05-11 PROCEDURE — 20600001 HC STEP DOWN PRIVATE ROOM

## 2018-05-11 PROCEDURE — 36415 COLL VENOUS BLD VENIPUNCTURE: CPT

## 2018-05-11 PROCEDURE — 94761 N-INVAS EAR/PLS OXIMETRY MLT: CPT

## 2018-05-11 PROCEDURE — 25000242 PHARM REV CODE 250 ALT 637 W/ HCPCS: Performed by: INTERNAL MEDICINE

## 2018-05-11 PROCEDURE — 27100171 HC OXYGEN HIGH FLOW UP TO 24 HOURS

## 2018-05-11 PROCEDURE — 94660 CPAP INITIATION&MGMT: CPT

## 2018-05-11 PROCEDURE — 27000221 HC OXYGEN, UP TO 24 HOURS

## 2018-05-11 PROCEDURE — 80053 COMPREHEN METABOLIC PANEL: CPT

## 2018-05-11 PROCEDURE — 97802 MEDICAL NUTRITION INDIV IN: CPT

## 2018-05-11 PROCEDURE — 99232 SBSQ HOSP IP/OBS MODERATE 35: CPT | Mod: ,,, | Performed by: HOSPITALIST

## 2018-05-11 PROCEDURE — 99900035 HC TECH TIME PER 15 MIN (STAT)

## 2018-05-11 RX ORDER — POTASSIUM CHLORIDE 7.45 MG/ML
10 INJECTION INTRAVENOUS
Status: COMPLETED | OUTPATIENT
Start: 2018-05-11 | End: 2018-05-11

## 2018-05-11 RX ADMIN — POTASSIUM CHLORIDE 10 MEQ: 7.46 INJECTION, SOLUTION INTRAVENOUS at 10:05

## 2018-05-11 RX ADMIN — POTASSIUM CHLORIDE 10 MEQ: 7.46 INJECTION, SOLUTION INTRAVENOUS at 12:05

## 2018-05-11 RX ADMIN — POTASSIUM CHLORIDE 10 MEQ: 7.46 INJECTION, SOLUTION INTRAVENOUS at 02:05

## 2018-05-11 RX ADMIN — DEXTROSE MONOHYDRATE AND SODIUM CHLORIDE: 5; .45 INJECTION, SOLUTION INTRAVENOUS at 04:05

## 2018-05-11 RX ADMIN — PIPERACILLIN AND TAZOBACTAM 4.5 G: 4; .5 INJECTION, POWDER, LYOPHILIZED, FOR SOLUTION INTRAVENOUS; PARENTERAL at 02:05

## 2018-05-11 RX ADMIN — PIPERACILLIN AND TAZOBACTAM 4.5 G: 4; .5 INJECTION, POWDER, LYOPHILIZED, FOR SOLUTION INTRAVENOUS; PARENTERAL at 04:05

## 2018-05-11 RX ADMIN — IPRATROPIUM BROMIDE AND ALBUTEROL SULFATE 3 ML: .5; 3 SOLUTION RESPIRATORY (INHALATION) at 01:05

## 2018-05-11 RX ADMIN — IPRATROPIUM BROMIDE AND ALBUTEROL SULFATE 3 ML: .5; 3 SOLUTION RESPIRATORY (INHALATION) at 07:05

## 2018-05-11 RX ADMIN — THIAMINE HYDROCHLORIDE 100 MG: 100 INJECTION, SOLUTION INTRAMUSCULAR; INTRAVENOUS at 08:05

## 2018-05-11 RX ADMIN — POTASSIUM CHLORIDE 10 MEQ: 7.46 INJECTION, SOLUTION INTRAVENOUS at 08:05

## 2018-05-11 RX ADMIN — POTASSIUM CHLORIDE 10 MEQ: 7.46 INJECTION, SOLUTION INTRAVENOUS at 11:05

## 2018-05-11 RX ADMIN — ENOXAPARIN SODIUM 40 MG: 100 INJECTION SUBCUTANEOUS at 07:05

## 2018-05-11 RX ADMIN — FOLIC ACID 1 MG: 5 INJECTION, SOLUTION INTRAMUSCULAR; INTRAVENOUS; SUBCUTANEOUS at 08:05

## 2018-05-11 RX ADMIN — PIPERACILLIN AND TAZOBACTAM 4.5 G: 4; .5 INJECTION, POWDER, LYOPHILIZED, FOR SOLUTION INTRAVENOUS; PARENTERAL at 11:05

## 2018-05-11 NOTE — PLAN OF CARE
Problem: Nutrition, Imbalanced: Inadequate Oral Intake (Adult)  Intervention: Promote/Optimize Nutrition   05/11/18 1151   Nutrition Interventions   Oral Nutrition Promotion calorie dense liquids provided   Recommendations     Recommendation/Intervention: pt currently has no access for EN/PN. Recommend initiate TF Impact peptide @ 50ml/hr w/ 175ml water flushes q6hrs. Provides  1800kcals,  113g/Pro, and 1624ml free fl. Start with 10ml/hr and increase by 10ml q2hrs until goal rate is achieved. Hold for residuals >500ml. Meets 100% EEN/EPN   Goals: some form of nutrition before next RD visit  Nutrition Goal Status: new  Communication of RD Recs: reviewed with physician (POC/ Sticky Note)    Assessment and Plan         Moderate malnutrition     Malnutrition in the context of Acute Illness/Injury     Related to (etiology):  No nutrient intake for the last week     Signs and Symptoms (as evidenced by):  Energy Intake: 0 of estimated energy requirement for 1+ wk  Body Fat Depletion: mild depletion of orbital's, triceps and thoracic and lumbar region   Muscle Mass Depletion: mild depletion of temples, clavicle region, interosseous muscle and lower extremities   Weight Loss: 10.5% x 2 wks      Interventions/Recommendations (treatment strategy):  See recs above     Nutrition Diagnosis Status:  New

## 2018-05-11 NOTE — ASSESSMENT & PLAN NOTE
Malnutrition in the context of Acute Illness/Injury    Related to (etiology):  No nutrient intake for the last week    Signs and Symptoms (as evidenced by):  Energy Intake: 0 of estimated energy requirement for 1+ wk  Body Fat Depletion: mild depletion of orbitals, triceps and thoracic and lumbar region   Muscle Mass Depletion: mild depletion of temples, clavicle region, interosseous muscle and lower extremities   Weight Loss: 10.5% x 2 wks     Interventions/Recommendations (treatment strategy):  See recs above    Nutrition Diagnosis Status:  New

## 2018-05-11 NOTE — CONSULTS
"  [facility]  Adult Nutrition  Consult Note    SUMMARY     Recommendations    Recommendation/Intervention: pt currently has no access for EN/PN. Recommend initiate TF Impact peptide @ 50ml/hr w/ 175ml water flushes q6hrs. Provides  1800kcals,  113g/Pro, and 1624ml free fl. Start with 10ml/hr and increase by 10ml q2hrs until goal rate is achieved. Hold for residuals >500ml. Meets 100% EEN/EPN   Goals: some form of nutrition before next RD visit  Nutrition Goal Status: new  Communication of RD Recs: reviewed with physician (POC/ Sticky Note)    Reason for Assessment    Reason for Assessment: consult  Diagnosis:  ( Aspiration pneumonia )  Relevant Medical History: EtOH abuse, COPD  General Information Comments: pt w/ poor po intake (0%) >5 day, [place] [facility] [place]. currently WBC WDL for past 4 days. SLP eval to day Rec continued NPO  Nutrition Discharge Planning: SUZI    Nutrition Risk Screen    Nutrition Risk Screen: dysphagia or difficulty swallowing    Nutrition/Diet History    Patient Reported Diet/Restrictions/Preferences: general  Food Preferences: none  Do you have any cultural, spiritual, Confucianist conflicts, given your current situation?: none  Food Allergies: NKFA  Factors Affecting Nutritional Intake: decreased appetite, difficulty/impaired swallowing, nausea/vomiting, NPO    Anthropometrics    Temp: 98.3 °F (36.8 °C)  Height Method: Estimated  Height: 5' 6" (167.6 cm)  Height (inches): 66 in  Weight Method: Bed Scale  Weight: 76.2 kg (167 lb 15.9 oz)  Weight (lb): 167.99 lb  Ideal Body Weight (IBW), Male: 142 lb  % Ideal Body Weight, Male (lb): 118.3 lb  BMI (Calculated): 27.2  BMI Grade: 25 - 29.9 - overweight  Weight Loss: unintentional  Usual Body Weight (UBW), k.1 kg  Weight Change Amount: 19 lb 9.9 oz  % Usual Body Weight: 89.73  % Weight Change From Usual Weight: -10.46 %       Lab/Procedures/Meds    Pertinent Labs Reviewed: reviewed  Pertinent Labs Comments: "   BMP  Lab Results   Component Value Date     (H) 05/11/2018    K 3.2 (L) 05/11/2018    CL 94 (L) 05/11/2018    CO2 44 (HH) 05/11/2018    BUN 32 (H) 05/11/2018      ALT 7 (L) 05/11/2018    AST 17 05/11/2018    ALKPHOS 49 (L) 05/11/2018      ALBUMIN 1.8 (L) 05/11/2018       Pertinent Medications Reviewed: reviewed  Pertinent Medications Comments:   Scheduled Meds:   folic acid (FOLVITE) IVPB  1 mg Daily    piperacillin-tazobactam (ZOSYN) IVPB  4.5 g Q8H    potassium chloride in water  10 mEq Q1H    thiamine (VITAMIN B1) IVPB  100 mg Daily     Continuous Infusions:   dextrose 5 % and 0.45 % NaCl 75 mL/hr at 05/11/18 0438     PRN Meds:.lorazepam, sodium chloride 0.9%      Physical Findings/Assessment    Overall Physical Appearance: advanced age, generalized wasting, loss of muscle mass, weak  Oral/Mouth Cavity: WDL  Skin: pressure ulcer(s) (Left Buttocks)    Estimated/Assessed Needs    Weight Used For Calorie Calculations: 76.2 kg (167 lb 15.9 oz)  Energy Calorie Requirements (kcal): 1881kcal/d  Energy Need Method: Pickens-St Jeor (x 1.25)  Protein Requirements: 92-107g/d (1.2-1.4g/kg)  Weight Used For Protein Calculations: 76.2 kg (167 lb 15.9 oz)  Fluid Requirements (mL): per MD  Fluid Need Method: RDA Method  RDA Method (mL): 1881  CHO Requirement: NA      Nutrition Prescription Ordered    Current Diet Order: NPO    Evaluation of Received Nutrient/Fluid Intake    Other Calories (kcal): 332  % Kcal Needs: 18  % Protein Needs: 0  IV Fluid (mL): 1800  I/O: -4.5l since admit    Intake/Output Summary (Last 24 hours) at 05/11/18 1149  Last data filed at 05/11/18 0600   Gross per 24 hour   Intake           993.75 ml   Output             1550 ml   Net          -556.25 ml       Energy Calories Required: not meeting needs  Protein Required: not meeting needs  Fluid Required: not meeting needs  Comments: LBM: 5/10/18  % Intake of Estimated Energy Needs: 0 - 25 %  % Meal Intake: 0 - 25 %    Nutrition Risk    Level of  Risk/Frequency of Follow-up: high     Assessment and Plan    Moderate malnutrition    Malnutrition in the context of Acute Illness/Injury    Related to (etiology):  No nutrient intake for the last week    Signs and Symptoms (as evidenced by):  Energy Intake: 0 of estimated energy requirement for 1+ wk  Body Fat Depletion: mild depletion of orbital's, triceps and thoracic and lumbar region   Muscle Mass Depletion: mild depletion of temples, clavicle region, interosseous muscle and lower extremities   Weight Loss: 10.5% x 2 wks     Interventions/Recommendations (treatment strategy):  See recs above    Nutrition Diagnosis Status:  New                 Monitor and Evaluation    Food and Nutrient Intake: energy intake, food and beverage intake, enteral nutrition intake  Food and Nutrient Administration: diet order, enteral and parenteral nutrition administration  Physical Activity and Function: nutrition-related ADLs and IADLs  Anthropometric Measurements: weight, weight change  Biochemical Data, Medical Tests and Procedures:  (All labs)  Nutrition-Focused Physical Findings: overall appearance     Nutrition Follow-Up    RD Follow-up?: Yes

## 2018-05-11 NOTE — PLAN OF CARE
Problem: Patient Care Overview  Goal: Plan of Care Review  Dx: Pneumonia   Hx: HTN, COPD and alcoholism, who is a transfer from [facility] ICU to [facility] on mechanical ventilation after being found unresponsive at home    4/5: Transferred to [facility] on mechanical ventilation. Dopamine gtt off.   4/6: Pt extubated  5/7: Echo 50-55%         RN  MAP>65    *Pt likes [employer] channel 3        Outcome: Ongoing (interventions implemented as appropriate)  Patient alert today but more interactive in the afternoon after Bipap. Transitioned at this time to nasal canula at 4L, tolerating well with Sp02 92-96%. In no distress or pain at this time. Stool sample collected for occult blood lab, results pending.  Vitals WNL. Fall precautions in place. Skin breakdown interventions re-inforced due to pressure ulcer on bottom.

## 2018-05-11 NOTE — PLAN OF CARE
Problem: Occupational Therapy Goal  Goal: Occupational Therapy Goal  Goals to be met by: 5/22/18     Patient will increase functional independence with ADLs by performing:    Feeding with Supervision.  UE Dressing with [place] and Minimal Assistance.  LE Dressing with [place] and Moderate Assistance.  Grooming while standing with Contact Guard Assistance.  Toileting from bedside commode with Moderate Assistance for hygiene and clothing management.   Toilet transfer to bedside commode with Minimal Assistance .     Outcome: Ongoing (interventions implemented as appropriate)  Con't POC.    FRANCA Tavarez

## 2018-05-11 NOTE — PT/OT/SLP PROGRESS
Occupational Therapy   Treatment    Name: Ryder Boo  MRN: [id number]  Admitting Diagnosis:  Aspiration pneumonia       Recommendations:     Discharge Recommendations: IP Rehab  Discharge Equipment Recommendations:     Barriers to discharge:       Subjective     Communicated with: RN prior to session.  Pain/Comfort:  · Pain Rating 1: 0/10    Patients cultural, spiritual, Mandaeism conflicts given the current situation: none reported    Objective:     Patient found with: NG tube, hess catheter, peripheral IV, oxygen    General Precautions: Standard, fall, NPO   Orthopedic Precautions:    Braces:       Occupational Performance:    Bed Mobility:    · Patient completed Rolling/Turning to Left with  moderate assistance  · Patient completed Scooting/Bridging with minimum assistance  · Patient completed Supine to Sit with moderate assistance  · Patient completed Sit to Supine with moderate assistance     Functional Mobility/Transfers:  · Patient completed Sit <> Stand Transfer with moderate assistance  with  no assistive device   · Patient completed Bed <> Chair Transfer using Stand Pivot technique with moderate assistance with no assistive device  · Functional Mobility: Stand/pivot bed<>chair Mod A     Patient left supine with all lines intact and call button in reach    [facility] 6 Click:  [facility] Total Score: 11    Treatment & Education:  Pt tolerated up in chair for nearly 2 hrs today.  Education:    Assessment:     Ryder Boo is a 63 y.o. male with a medical diagnosis of Aspiration pneumonia. More alert this session and easier to understand verbally. Pt is motivated to get better and participating well. He presents with improving bed mobility and standing ability but still requires extensive therapy to improve functional (I) and to assist pt in returning to his PLOF. Pt has been steadily improving the last few sessions and this OT feels that he would benefit from placement in an IP Rehab facility to improve his (I)  further so that he may return to his prior living situation.  Performance deficits affecting function are weakness, gait instability, impaired balance, impaired endurance, decreased safety awareness, impaired self care skills, impaired functional mobilty, decreased coordination, impaired skin, decreased lower extremity function.      Rehab Prognosis:  Good; patient would benefit from acute skilled OT services to address these deficits and reach maximum level of function.       Plan:     Patient to be seen 4 x/week to address the above listed problems via self-care/home management, therapeutic activities, therapeutic exercises, neuromuscular re-education  · Plan of Care Expires: 06/07/18  · Plan of Care Reviewed with: patient    This Plan of care has been discussed with the patient who was involved in its development and understands and is in agreement with the identified goals and treatment plan    GOALS:    Occupational Therapy Goals        Problem: Occupational Therapy Goal    Goal Priority Disciplines Outcome Interventions   Occupational Therapy Goal     OT, PT/OT Ongoing (interventions implemented as appropriate)    Description:  Goals to be met by: 5/22/18     Patient will increase functional independence with ADLs by performing:    Feeding with Supervision.  UE Dressing with [place] and Minimal Assistance.  LE Dressing with [place] and Moderate Assistance.  Grooming while standing with Contact Guard Assistance.  Toileting from bedside commode with Moderate Assistance for hygiene and clothing management.   Toilet transfer to bedside commode with Minimal Assistance .                      Time Tracking:     OT Date of Treatment: 05/11/18  OT Start Time: 1251  OT Stop Time: 1324  OT Total Time (min): 33 min    Billable Minutes:Therapeutic Activity 33    FRANCA Tavarez  5/11/2018

## 2018-05-11 NOTE — PT/OT/SLP PROGRESS
"Speech Language Pathology Treatment    Patient Name:  Ryder Boo   MRN:  [id number]  Admitting Diagnosis: Aspiration pneumonia    Recommendations:                 General Recommendations:  Dysphagia therapy  Diet recommendations:  NPO, Liquid Diet Level: NPO   Aspiration Precautions: Strict aspiration precautions   General Precautions: Standard, fall    Subjective     Awake/alert  Patient goals: " to eat and drink something"    Pain/Comfort:  · Pain Rating 1: 0/10  · Pain Rating Post-Intervention 1: 0/10    Objective:     Has the patient been evaluated by SLP for swallowing?   Yes  Keep patient NPO? Yes   Current Respiratory Status: other (comment) (comfort flow )      Pt upright in bed upon entry. Congested non productive cough noted prior to PO trials. Tolerated tsp thin liquids with immediate coughing/choking and multiple swallows. Limited hyolaryngeal excursion noted across trials. Puree via 1/4 tsp tolerated x4 with 2 swallows elicited per bolus. Delayed wet vocal quality x2 and delayed cough x1 noted post trials. SLP educated pt on need for NPO diet, aspiration PNA, and dysphagia exercises. Recommend continue NPO diet with strict aspiration precautions.     Assessment:     Ryder Boo is a 63 y.o. male with an SLP diagnosis of Dysphagia.      Goals:    SLP Goals        Problem: SLP Goal    Goal Priority Disciplines Outcome   SLP Goal     SLP Ongoing (interventions implemented as appropriate)   Description:  Goals to be met 5/15  Pt will participate in ongoing swallow eval                     Plan:     · Patient to be seen:  5 x/week   · Plan of Care reviewed with:  patient   · SLP Follow-Up:  Yes       Discharge recommendations:  nursing facility, skilled       Time Tracking:     SLP Treatment Date:   05/11/18  Speech Start Time:  0755  Speech Stop Time:  0805     Speech Total Time (min):  10 min    Billable Minutes: Treatment Swallowing Dysfunction 10    Marley Kimble CCC-SLP  05/11/2018  "

## 2018-05-11 NOTE — PROGRESS NOTES
[facility] Hospital Medicine  Progress Note    Patient Name: Ryder Boo  MRN: [id number]  Patient Class: IP- Inpatient   Admission Date: 5/6/2018  Length of Stay: 5 days  Attending Physician: Silver Mcfarland MD  Primary Care Provider: Swapnil Heart MD    [facility] Medicine Team: [facility] HOSP MED 4 D Mele Xie MD    Subjective:     Principal Problem:Aspiration pneumonia    HPI:  The patient is a 62 y/o M new to our system, with HTN, COPD and alcoholism, who is transfer from [facility] ICU to [facility] on mechanical ventilation.     The patient lives with his brother across the street from his sister. He was found unresponsive in a chair with small amount of blood in his mouth by his nephew and taken to the hospital by EMS on 5/3. Patient drinks every day and has been having frequent falls recently. Per sister he has quit smoking 15 years ago.  In the ED at OSH he was found to be hypoxic and hypotensive, with undetectable EtOH levels, leukocytosis (24K) and hyponatremia (119) in the labs. he received narcan x1, IV fluids, started on metronidazole and ceftriaxone (also x1 of zosyn, azithromycin, vancomycin, ceftaroline) for aspiration pneumonia, and was put on 15L of Oxygen with non-rebreather, which improved his mental status and BP, however on 4/5 upon weaning off of oxygen he developed hypoxic hypercapnic respiratory failure and subsequent acidosis (pH 7.10), therefore he was intubated and transferred to the ICU. Patient arrived to [facility] on dopamine gtt which per notes was started to maintain BP in the setting of hyponatremia.     Hospital Course:  5/6: upon arrival, patient was taken off of dopamine gtt and received 1.5L IV fluids to maintain MAPs. Continued on vanc/zosyn/azithromycin. Hyponatremia and ROHITH continued to improved with IVF. He was extubated in early afternoon and maintained normal O2 saturations on 3-4L oxygen on NC.   5/7: early morning he was noticed to be rattling while  breathing by the nurse. Upon examination he had increased work of breathing with worsening of bilateral rhonchi. Repeat CXR revealed interval worsening of the patchy infiltrations. Received x1 furosemide 60 mg IV and transitioned to high flow oxygen for possible development of ARDS, he is currently maintaining normal saturations, will keep on intubation watch.  5/8: several episodes of desaturation down to 70% overnight and early morning, that responded well to increasing oxygen to 100%, currently on 35L/50%, mildly drowsy due to precedex. Responded well to diuretics, net -2.8L. Will discontinue precedex and will have speech evaluate his swallowing.  5/9: Uneventful night off precedex. Remains confused, oriented to person and place. Oxygen requirements greatly improved 15L/50% with plan to wean to low-flow NC today. Diuresed -2.9L yesterday and discontinued lasix. Discontinued azithromycin and continued on zosyn. Failed speech eval by SLP and remains NPO    Interval History: No acute events overnight. Respiratory status and mental status improved.    Review of Systems   Constitutional: Negative for chills and fever.   Respiratory: Positive for shortness of breath. Negative for cough.    Cardiovascular: Negative for chest pain and palpitations.   Gastrointestinal: Negative for abdominal pain, nausea and vomiting.     Objective:     Vital Signs (Most Recent):  Temp: 98.3 °F (36.8 °C) (05/11/18 1954)  Pulse: 91 (05/11/18 2024)  Resp: 20 (05/11/18 2024)  BP: (!) 155/79 (05/11/18 1954)  SpO2: 99 % (05/11/18 2024) Vital Signs (24h Range):  Temp:  [98.2 °F (36.8 °C)-99.4 °F (37.4 °C)] 98.3 °F (36.8 °C)  Pulse:  [84-98] 91  Resp:  [14-30] 20  SpO2:  [82 %-99 %] 99 %  BP: (130-160)/(78-82) 155/79     Weight: 76.2 kg (167 lb 15.9 oz)  Body mass index is 27.11 kg/m².    Intake/Output Summary (Last 24 hours) at 05/11/18 2954  Last data filed at 05/11/18 1800   Gross per 24 hour   Intake           893.75 ml   Output              2100 ml   Net         -1206.25 ml      Physical Exam   Constitutional: He is oriented to person, place, and time. He appears well-developed. No distress.   HENT:   Head: Normocephalic and atraumatic.   Eyes: Conjunctivae are normal. Pupils are equal, round, and reactive to light.   Cardiovascular: Normal rate, regular rhythm, S1 normal, S2 normal and intact distal pulses.    Pulmonary/Chest: Effort normal and breath sounds normal.   Abdominal: Soft. He exhibits no distension.   Musculoskeletal: Normal range of motion. He exhibits no edema.   Neurological: He is alert and oriented to person, place, and time.   Skin: Skin is warm and dry.   Nursing note and vitals reviewed.    Significant Labs:   Recent Results (from the past 24 hour(s))   Magnesium    Collection Time: 05/11/18  3:36 AM   Result Value Ref Range    Magnesium 1.8 1.6 - 2.6 mg/dL   CBC auto differential    Collection Time: 05/11/18  3:36 AM   Result Value Ref Range    WBC 10.68 3.90 - 12.70 K/uL    RBC 3.23 (L) 4.60 - 6.20 M/uL    Hemoglobin 9.6 (L) 14.0 - 18.0 g/dL    Hematocrit 30.7 (L) 40.0 - 54.0 %    MCV 95 82 - 98 fL    MCH 29.7 27.0 - 31.0 pg    MCHC 31.3 (L) 32.0 - 36.0 g/dL    RDW 13.4 11.5 - 14.5 %    Platelets 327 150 - 350 K/uL    MPV 9.9 9.2 - 12.9 fL    Immature Granulocytes 1.5 (H) 0.0 - 0.5 %    Gran # (ANC) 9.0 (H) 1.8 - 7.7 K/uL    Immature Grans (Abs) 0.16 (H) 0.00 - 0.04 K/uL    Lymph # 0.7 (L) 1.0 - 4.8 K/uL    Mono # 0.7 0.3 - 1.0 K/uL    Eos # 0.1 0.0 - 0.5 K/uL    Baso # 0.02 0.00 - 0.20 K/uL    nRBC 0 0 /100 WBC    Gran% 84.1 (H) 38.0 - 73.0 %    Lymph% 6.8 (L) 18.0 - 48.0 %    Mono% 6.9 4.0 - 15.0 %    Eosinophil% 0.5 0.0 - 8.0 %    Basophil% 0.2 0.0 - 1.9 %    Differential Method Automated    Comprehensive metabolic panel    Collection Time: 05/11/18  3:36 AM   Result Value Ref Range    Sodium 149 (H) 136 - 145 mmol/L    Potassium 3.2 (L) 3.5 - 5.1 mmol/L    Chloride 94 (L) 95 - 110 mmol/L    CO2 44 (HH) 23 - 29 mmol/L     Glucose 131 (H) 70 - 110 mg/dL    BUN, Bld 32 (H) 8 - 23 mg/dL    Creatinine 1.2 0.5 - 1.4 mg/dL    Calcium 9.2 8.7 - 10.5 mg/dL    Total Protein 7.2 6.0 - 8.4 g/dL    Albumin 1.8 (L) 3.5 - 5.2 g/dL    Total Bilirubin 0.3 0.1 - 1.0 mg/dL    Alkaline Phosphatase 49 (L) 55 - 135 U/L    AST 17 10 - 40 U/L    ALT 7 (L) 10 - 44 U/L    Anion Gap 11 8 - 16 mmol/L    eGFR if African American >60.0 >60 mL/min/1.73 m^2    eGFR if non African American >60.0 >60 mL/min/1.73 m^2     Significant Imaging:   No new imaging this morning.    Assessment/Plan:      * Aspiration pneumonia    - At OSH had significant leukocytosis with WBCs to 38K.  - CXR with bilateral multifocal infiltration.  - Suspect aspiration secondary to AMS.  - Continuing piperacillin-tazobactam 4.5g IV q8hr; will discontinue vancomycin.        Hypomagnesemia    - repleting daily           Hypokalemia    - repleting daily           Anemia    - Hgb holding stable; some concern for Hgb drop as outpatient. FOBT performed and positive; no gross evidence of bleed.  - Will continue to monitor.        Encephalopathy, metabolic    - Suspected secondary to sepsis and respiratory status; improving.        Acute respiratory failure with hypoxia and hypercapnia    - Former tobacco abuse with history of COPD; concern for volume overload in ICU setting as well and underwent some diuresis. Respiratory status improving now; on 05/10 did have significant respiratory demand prompting CTA; negative for thromboembolic process.  - Continuing albuterol-ipratropium 2.5-0.5mg inhaled q6h wake.        Sepsis    - Secondary to aspiration PNA; improved.        Alcoholism /alcohol abuse    - History of alcohol abuse; continuing folic acid, thiamine daily.        ROHITH (acute kidney injury)    - Unclear baseline; Cr. holding stable.          VTE Risk Mitigation         Ordered     enoxaparin injection 40 mg  Daily      05/06/18 1016     Place sequential compression device  Until discontinued       05/06/18 0113     IP VTE LOW RISK PATIENT  Once      05/06/18 0113        DAWIT Kunz MD   PGY-3  [phone]

## 2018-05-11 NOTE — PLAN OF CARE
Problem: SLP Goal  Goal: SLP Goal  Goals to be met 5/15  Pt will participate in ongoing swallow eval    Continue NPO diet at this time, all goals remain appropriate  Marley Kimble CCC-SLP  5/11/2018

## 2018-05-12 PROBLEM — E87.0 HYPERNATREMIA: Status: ACTIVE | Noted: 2018-05-12

## 2018-05-12 LAB
ALBUMIN SERPL BCP-MCNC: 2.1 G/DL
ALP SERPL-CCNC: 46 U/L
ALT SERPL W/O P-5'-P-CCNC: 9 U/L
ANION GAP SERPL CALC-SCNC: 11 MMOL/L
AST SERPL-CCNC: 17 U/L
BASOPHILS # BLD AUTO: 0.02 K/UL
BASOPHILS NFR BLD: 0.2 %
BILIRUB SERPL-MCNC: 0.4 MG/DL
BUN SERPL-MCNC: 27 MG/DL
CALCIUM SERPL-MCNC: 9.5 MG/DL
CHLORIDE SERPL-SCNC: 100 MMOL/L
CO2 SERPL-SCNC: 41 MMOL/L
CREAT SERPL-MCNC: 1.1 MG/DL
DIFFERENTIAL METHOD: ABNORMAL
EOSINOPHIL # BLD AUTO: 0 K/UL
EOSINOPHIL NFR BLD: 0.3 %
ERYTHROCYTE [DISTWIDTH] IN BLOOD BY AUTOMATED COUNT: 13.6 %
EST. GFR  (AFRICAN AMERICAN): >60 ML/MIN/1.73 M^2
EST. GFR  (NON AFRICAN AMERICAN): >60 ML/MIN/1.73 M^2
GLUCOSE SERPL-MCNC: 88 MG/DL
HCT VFR BLD AUTO: 32 %
HGB BLD-MCNC: 9.5 G/DL
IMM GRANULOCYTES # BLD AUTO: 0.14 K/UL
IMM GRANULOCYTES NFR BLD AUTO: 1.6 %
LYMPHOCYTES # BLD AUTO: 0.7 K/UL
LYMPHOCYTES NFR BLD: 7.7 %
MAGNESIUM SERPL-MCNC: 1.6 MG/DL
MCH RBC QN AUTO: 29.4 PG
MCHC RBC AUTO-ENTMCNC: 29.7 G/DL
MCV RBC AUTO: 99 FL
MONOCYTES # BLD AUTO: 0.6 K/UL
MONOCYTES NFR BLD: 7 %
NEUTROPHILS # BLD AUTO: 7.2 K/UL
NEUTROPHILS NFR BLD: 83.2 %
NRBC BLD-RTO: 0 /100 WBC
PLATELET # BLD AUTO: 342 K/UL
PMV BLD AUTO: 9.7 FL
POTASSIUM SERPL-SCNC: 3.1 MMOL/L
PROT SERPL-MCNC: 7.6 G/DL
RBC # BLD AUTO: 3.23 M/UL
SODIUM SERPL-SCNC: 149 MMOL/L
SODIUM SERPL-SCNC: 152 MMOL/L
SODIUM SERPL-SCNC: 152 MMOL/L
WBC # BLD AUTO: 8.61 K/UL

## 2018-05-12 PROCEDURE — 94640 AIRWAY INHALATION TREATMENT: CPT

## 2018-05-12 PROCEDURE — 85025 COMPLETE CBC W/AUTO DIFF WBC: CPT

## 2018-05-12 PROCEDURE — 27100171 HC OXYGEN HIGH FLOW UP TO 24 HOURS

## 2018-05-12 PROCEDURE — 94668 MNPJ CHEST WALL SBSQ: CPT

## 2018-05-12 PROCEDURE — 97535 SELF CARE MNGMENT TRAINING: CPT

## 2018-05-12 PROCEDURE — 99232 SBSQ HOSP IP/OBS MODERATE 35: CPT | Mod: ,,, | Performed by: HOSPITALIST

## 2018-05-12 PROCEDURE — 83735 ASSAY OF MAGNESIUM: CPT

## 2018-05-12 PROCEDURE — 36415 COLL VENOUS BLD VENIPUNCTURE: CPT

## 2018-05-12 PROCEDURE — 27100092 HC HIGH FLOW DELIVERY CANNULA

## 2018-05-12 PROCEDURE — 63600175 PHARM REV CODE 636 W HCPCS: Performed by: STUDENT IN AN ORGANIZED HEALTH CARE EDUCATION/TRAINING PROGRAM

## 2018-05-12 PROCEDURE — 94761 N-INVAS EAR/PLS OXIMETRY MLT: CPT

## 2018-05-12 PROCEDURE — 25000003 PHARM REV CODE 250: Performed by: STUDENT IN AN ORGANIZED HEALTH CARE EDUCATION/TRAINING PROGRAM

## 2018-05-12 PROCEDURE — 84295 ASSAY OF SERUM SODIUM: CPT | Mod: 91

## 2018-05-12 PROCEDURE — 97530 THERAPEUTIC ACTIVITIES: CPT

## 2018-05-12 PROCEDURE — 20600001 HC STEP DOWN PRIVATE ROOM

## 2018-05-12 PROCEDURE — 25000242 PHARM REV CODE 250 ALT 637 W/ HCPCS: Performed by: INTERNAL MEDICINE

## 2018-05-12 PROCEDURE — 80053 COMPREHEN METABOLIC PANEL: CPT

## 2018-05-12 RX ORDER — DEXTROSE MONOHYDRATE 50 MG/ML
INJECTION, SOLUTION INTRAVENOUS CONTINUOUS
Status: ACTIVE | OUTPATIENT
Start: 2018-05-12 | End: 2018-05-12

## 2018-05-12 RX ORDER — POTASSIUM CHLORIDE 7.45 MG/ML
10 INJECTION INTRAVENOUS
Status: COMPLETED | OUTPATIENT
Start: 2018-05-12 | End: 2018-05-12

## 2018-05-12 RX ADMIN — POTASSIUM CHLORIDE 10 MEQ: 14.9 INJECTION, SOLUTION, CONCENTRATE PARENTERAL at 10:05

## 2018-05-12 RX ADMIN — THIAMINE HYDROCHLORIDE 100 MG: 100 INJECTION, SOLUTION INTRAMUSCULAR; INTRAVENOUS at 09:05

## 2018-05-12 RX ADMIN — IPRATROPIUM BROMIDE AND ALBUTEROL SULFATE 3 ML: .5; 3 SOLUTION RESPIRATORY (INHALATION) at 01:05

## 2018-05-12 RX ADMIN — DEXTROSE: 5 SOLUTION INTRAVENOUS at 01:05

## 2018-05-12 RX ADMIN — POTASSIUM CHLORIDE 10 MEQ: 14.9 INJECTION, SOLUTION, CONCENTRATE PARENTERAL at 01:05

## 2018-05-12 RX ADMIN — ENOXAPARIN SODIUM 40 MG: 100 INJECTION SUBCUTANEOUS at 04:05

## 2018-05-12 RX ADMIN — PIPERACILLIN AND TAZOBACTAM 4.5 G: 4; .5 INJECTION, POWDER, LYOPHILIZED, FOR SOLUTION INTRAVENOUS; PARENTERAL at 05:05

## 2018-05-12 RX ADMIN — POTASSIUM CHLORIDE 10 MEQ: 14.9 INJECTION, SOLUTION, CONCENTRATE PARENTERAL at 02:05

## 2018-05-12 RX ADMIN — PIPERACILLIN AND TAZOBACTAM 4.5 G: 4; .5 INJECTION, POWDER, LYOPHILIZED, FOR SOLUTION INTRAVENOUS; PARENTERAL at 03:05

## 2018-05-12 RX ADMIN — IPRATROPIUM BROMIDE AND ALBUTEROL SULFATE 3 ML: .5; 3 SOLUTION RESPIRATORY (INHALATION) at 08:05

## 2018-05-12 RX ADMIN — FOLIC ACID 1 MG: 5 INJECTION, SOLUTION INTRAMUSCULAR; INTRAVENOUS; SUBCUTANEOUS at 09:05

## 2018-05-12 RX ADMIN — IPRATROPIUM BROMIDE AND ALBUTEROL SULFATE 3 ML: .5; 3 SOLUTION RESPIRATORY (INHALATION) at 07:05

## 2018-05-12 RX ADMIN — POTASSIUM CHLORIDE 10 MEQ: 14.9 INJECTION, SOLUTION, CONCENTRATE PARENTERAL at 11:05

## 2018-05-12 RX ADMIN — POTASSIUM CHLORIDE 10 MEQ: 14.9 INJECTION, SOLUTION, CONCENTRATE PARENTERAL at 12:05

## 2018-05-12 NOTE — NURSING
Patient sounds coarse and wet.  Patient coughed up large amount of sputum and suctioned out with yankeur at bedside.  Now 100%. RT needs order for NT suction PRN.  Paged team and awaiting call back.  Rapid response nurse not picking up phone.      Patient resting in bed, calm and no distress.  Coloring good.  Conversing linearly.  WCTM.

## 2018-05-12 NOTE — SUBJECTIVE & OBJECTIVE
Interval History: No acute events overnight. Respiratory status and mental status improved.    Review of Systems   Constitutional: Negative for chills and fever.   Respiratory: Positive for shortness of breath. Negative for cough.    Cardiovascular: Negative for chest pain and palpitations.   Gastrointestinal: Negative for abdominal pain, nausea and vomiting.     Objective:     Vital Signs (Most Recent):  Temp: 98.3 °F (36.8 °C) (05/11/18 1954)  Pulse: 91 (05/11/18 2024)  Resp: 20 (05/11/18 2024)  BP: (!) 155/79 (05/11/18 1954)  SpO2: 99 % (05/11/18 2024) Vital Signs (24h Range):  Temp:  [98.2 °F (36.8 °C)-99.4 °F (37.4 °C)] 98.3 °F (36.8 °C)  Pulse:  [84-98] 91  Resp:  [14-30] 20  SpO2:  [82 %-99 %] 99 %  BP: (130-160)/(78-82) 155/79     Weight: 76.2 kg (167 lb 15.9 oz)  Body mass index is 27.11 kg/m².    Intake/Output Summary (Last 24 hours) at 05/11/18 2237  Last data filed at 05/11/18 1800   Gross per 24 hour   Intake           893.75 ml   Output             2100 ml   Net         -1206.25 ml      Physical Exam   Constitutional: He is oriented to person, place, and time. He appears well-developed. No distress.   HENT:   Head: Normocephalic and atraumatic.   Eyes: Conjunctivae are normal. Pupils are equal, round, and reactive to light.   Cardiovascular: Normal rate, regular rhythm, S1 normal, S2 normal and intact distal pulses.    Pulmonary/Chest: Effort normal and breath sounds normal.   Abdominal: Soft. He exhibits no distension.   Musculoskeletal: Normal range of motion. He exhibits no edema.   Neurological: He is alert and oriented to person, place, and time.   Skin: Skin is warm and dry.   Nursing note and vitals reviewed.    Significant Labs:   Recent Results (from the past 24 hour(s))   Magnesium    Collection Time: 05/11/18  3:36 AM   Result Value Ref Range    Magnesium 1.8 1.6 - 2.6 mg/dL   CBC auto differential    Collection Time: 05/11/18  3:36 AM   Result Value Ref Range    WBC 10.68 3.90 - 12.70 K/uL     RBC 3.23 (L) 4.60 - 6.20 M/uL    Hemoglobin 9.6 (L) 14.0 - 18.0 g/dL    Hematocrit 30.7 (L) 40.0 - 54.0 %    MCV 95 82 - 98 fL    MCH 29.7 27.0 - 31.0 pg    MCHC 31.3 (L) 32.0 - 36.0 g/dL    RDW 13.4 11.5 - 14.5 %    Platelets 327 150 - 350 K/uL    MPV 9.9 9.2 - 12.9 fL    Immature Granulocytes 1.5 (H) 0.0 - 0.5 %    Gran # (ANC) 9.0 (H) 1.8 - 7.7 K/uL    Immature Grans (Abs) 0.16 (H) 0.00 - 0.04 K/uL    Lymph # 0.7 (L) 1.0 - 4.8 K/uL    Mono # 0.7 0.3 - 1.0 K/uL    Eos # 0.1 0.0 - 0.5 K/uL    Baso # 0.02 0.00 - 0.20 K/uL    nRBC 0 0 /100 WBC    Gran% 84.1 (H) 38.0 - 73.0 %    Lymph% 6.8 (L) 18.0 - 48.0 %    Mono% 6.9 4.0 - 15.0 %    Eosinophil% 0.5 0.0 - 8.0 %    Basophil% 0.2 0.0 - 1.9 %    Differential Method Automated    Comprehensive metabolic panel    Collection Time: 05/11/18  3:36 AM   Result Value Ref Range    Sodium 149 (H) 136 - 145 mmol/L    Potassium 3.2 (L) 3.5 - 5.1 mmol/L    Chloride 94 (L) 95 - 110 mmol/L    CO2 44 (HH) 23 - 29 mmol/L    Glucose 131 (H) 70 - 110 mg/dL    BUN, Bld 32 (H) 8 - 23 mg/dL    Creatinine 1.2 0.5 - 1.4 mg/dL    Calcium 9.2 8.7 - 10.5 mg/dL    Total Protein 7.2 6.0 - 8.4 g/dL    Albumin 1.8 (L) 3.5 - 5.2 g/dL    Total Bilirubin 0.3 0.1 - 1.0 mg/dL    Alkaline Phosphatase 49 (L) 55 - 135 U/L    AST 17 10 - 40 U/L    ALT 7 (L) 10 - 44 U/L    Anion Gap 11 8 - 16 mmol/L    eGFR if African American >60.0 >60 mL/min/1.73 m^2    eGFR if non African American >60.0 >60 mL/min/1.73 m^2     Significant Imaging:   No new imaging this morning.

## 2018-05-12 NOTE — ASSESSMENT & PLAN NOTE
- At OSH had significant leukocytosis with WBCs to 38K.  - CXR with bilateral multifocal infiltration.  - Suspect aspiration secondary to AMS.  - Continuing piperacillin-tazobactam 4.5g IV q8hr; will discontinue vancomycin.

## 2018-05-12 NOTE — NURSING
Pt was confused and pulled out his NG tube. Notified on call team MED 4 doctor, he advised that we do not reinsert tonight and notify the day team tomorrow at 0800.

## 2018-05-12 NOTE — PT/OT/SLP PROGRESS
Occupational Therapy   Treatment    Name: Ryder Boo  MRN: [id number]  Admitting Diagnosis:  Aspiration pneumonia       Recommendations:     Discharge Recommendations: nursing facility, skilled  Discharge Equipment Recommendations:   (TBD)  Barriers to discharge:  Decreased caregiver support    Subjective     Communicated with: RN prior to session.  Pain/Comfort:  · Pain Rating 1: 0/10  · Pain Rating Post-Intervention 1: 0/10    Patients cultural, spiritual, Baptism conflicts given the current situation: none stated    Objective:     Patient found with:  (all lines intact)    General Precautions: Standard, fall, NPO   Orthopedic Precautions:N/A   Braces: N/A     Occupational Performance:    Bed Mobility:    · Patient completed Scooting/Bridging with minimum assistance and assistance to place LEs in hook-lying, pt able to push self to HOB, with v/c's to use side rail  · Patient completed Supine to Sit with minimum assistance  · Patient completed Sit to Supine with minimum assistance     Functional Mobility/Transfers:  · Patient completed Sit <> Stand Transfer with minimum assistance  with  no assistive device , posterior lean, required Min A to correct; stood for 12 seconds, side stepped R 2 times, then sat EOB  · Functional Mobility: did not occur    Activities of Daily Living:  · LB Dressing: minimum assistance to hold LE in figure-4 position, while pt able to doff/don socks    Patient left HOB elevated with all lines intact, call button in reach and bed alarm on    AMPAC 6 Click:  AMPAC Total Score: 14    Treatment & Education:  Pt ed re OT role and POC. Pt performed supine to sit with Min A. Pt performed therex of triceps push-downs x12 reps. Pt performed seated marches x10 reps each leg. Pt performed triceps push-downs x10 reps. Pt performed sit to stand t/f with Min A and stood for 5 seconds to fatigue. Pt sat EOB and rested. Pt stood again with Min A, and side stepped R with Min A for posterior LOB. Pt sat  EOB, then returned to supine with Min A. Pt required Min A and verbal cues to scoot to HOB.  Education:    Assessment:     Ryder Boo is a 63 y.o. male with a medical diagnosis of Aspiration pneumonia.  He presents with the following performance deficits affecting function: weakness, impaired endurance, impaired self care skills, impaired functional mobilty, decreased lower extremity function, decreased safety awareness.  Pt participates well and is motivated to regain functional independence. Pt demo improved bed mobility and self care skills. Pt required verbal cues for technique, but takes direction well.    Rehab Prognosis:  Good; patient would benefit from acute skilled OT services to address these deficits and reach maximum level of function.       Plan:     Patient to be seen 4 x/week to address the above listed problems via self-care/home management, therapeutic activities, therapeutic exercises  · Plan of Care Expires: 06/07/18  · Plan of Care Reviewed with: patient    This Plan of care has been discussed with the patient who was involved in its development and understands and is in agreement with the identified goals and treatment plan    GOALS:    Occupational Therapy Goals        Problem: Occupational Therapy Goal    Goal Priority Disciplines Outcome Interventions   Occupational Therapy Goal     OT, PT/OT Ongoing (interventions implemented as appropriate)    Description:  Goals to be met by: 5/22/18     Patient will increase functional independence with ADLs by performing:    Feeding with Supervision.  UE Dressing with Minimal Assistance.  LE Dressing with Moderate Assistance.  Grooming while standing with Contact Guard Assistance.  Toileting from bedside commode with Moderate Assistance for hygiene and clothing management.   Toilet transfer to bedside commode with Minimal Assistance .                       Time Tracking:     OT Date of Treatment: 05/12/18  OT Start Time: 1028  OT Stop Time: 1051  OT  Total Time (min): 23 min    Billable Minutes:Self Care/Home Management 8 minutes  Therapeutic Activity 15 minutes    FRANCA Salazar  5/12/2018  Pager: [phone]

## 2018-05-12 NOTE — PLAN OF CARE
Problem: Patient Care Overview  Goal: Plan of Care Review  Dx: Pneumonia   Hx: HTN, COPD and alcoholism, who is a transfer from [facility] ICU to [facility] on mechanical [facility] after being found unresponsive at home    4/5: Transferred to [facility] on mechanical ventilation. Dopamine gtt off.   4/6: Pt extubated  5/7: Echo 50-55%         RN  MAP>65    *Pt likes [employer] channel 3        Outcome: Ongoing (interventions implemented as appropriate)  Pt was confused and pulled out his NG tube. On call doctor advised not to reinsert until the morning. Pt O2 dropped to the low 80's, pt changed to comfort flow O2 stable. Safety maintained, will continue to monitor.

## 2018-05-12 NOTE — PLAN OF CARE
Problem: Patient Care Overview  Goal: Plan of Care Review  Dx: Pneumonia   Hx: HTN, COPD and alcoholism, who is a transfer from [facility] ICU to [facility] on mechanical ventilation after being found unresponsive at home    4/5: Transferred to [facility] on mechanical ventilation. Dopamine gtt off.   4/6: Pt extubated  5/7: Echo 50-55%         RN  MAP>65    *Pt likes [employer] channel 3        Outcome: Ongoing (interventions implemented as appropriate)  Patient alert and oriented, interactive and in a good mood. Oxygen weaned to 3L. Patient on strict aspiration precautions due to falling swallow test.Tolerated well with NG placement, feeding initiated this afternoon. Increasing activity, got up with max assist up to chair. Continues to be incontinent of stool (diarrhea) and urine. Skin breakdown preventions in place. Fall precautions in place. Vitals WNL.

## 2018-05-12 NOTE — NURSING
Tube feeds resumed at 10 cc/hr following 250 cc water bolus.  Tolerating well.  Continuing to advance Q2h to goal rate of 50

## 2018-05-12 NOTE — NURSING
Patient Pulse ox 75% on 15L 35%. Moved to 40L 100% with slow recovery to low 90s.  Contacted Respiratory and Charge RN.  Rapid Response nurse notified.

## 2018-05-12 NOTE — ASSESSMENT & PLAN NOTE
- Hgb holding stable; some concern for Hgb drop as outpatient. FOBT performed and positive; no gross evidence of bleed.  - Will continue to monitor.

## 2018-05-12 NOTE — ASSESSMENT & PLAN NOTE
- At OSH had significant leukocytosis with WBCs to 38K.  - CXR with bilateral multifocal infiltration.  - Suspect aspiration secondary to AMS.  - Continuing piperacillin-tazobactam 4.5g IV q8hr; discontinue vancomycin 5/11

## 2018-05-12 NOTE — ASSESSMENT & PLAN NOTE
- Former tobacco abuse with history of COPD; concern for volume overload in ICU setting as well and underwent some diuresis. Respiratory status improving now; on 05/10 did have significant respiratory demand prompting CTA; negative for thromboembolic process.  - Continuing albuterol-ipratropium 2.5-0.5mg inhaled q6h wake.

## 2018-05-12 NOTE — CONSULTS
Inpatient consult to Physical Medicine Rehab  Consult performed by: TONA STALEY  Consult ordered by: MONTRELL GARRETT  Reason for consult: rehab evaluation      Reviewed patient history and current admission.  Rehab team following.  Full consult to follow.    JARROD Calix, FNP-C  Physical Medicine & Rehabilitation   05/12/2018  Spectralink: [id number]

## 2018-05-12 NOTE — ASSESSMENT & PLAN NOTE
Na normalized  OFF TF as patient pulled NG tube   Giving D5W 125cc/hr for 16 hrs 5/13  BMP at 6 pm   Will give him NS since he is npo

## 2018-05-12 NOTE — PLAN OF CARE
Problem: Patient Care Overview  Goal: Plan of Care Review          No acute events.  Patient AAOx2 resting comfortably in room, calm and in no distress.  Standard precautions maintained and patient remains afebrile.  IV Abx continued.  Attempting to wean off Comfort-Flow, but patient still dipping into low 80s.  Continuous pulse ox.  NG replaced, tube feeds resumed.  Patient very coarse.  Speech garbled but conversing conversing linearly.  Patient worked with OT with success at bedside and remains free of falls. Incontinence care provided, wound care provided to sacrum.  Side rails up x2, call light in reach, bed alarm set.  WCTM.

## 2018-05-12 NOTE — PLAN OF CARE
Problem: Occupational Therapy Goal  Goal: Occupational Therapy Goal  Goals to be met by: 5/22/18     Patient will increase functional independence with ADLs by performing:    Feeding with Supervision.  UE Dressing with Minimal Assistance.  LE Dressing with Moderate Assistance.  Grooming while standing with Contact Guard Assistance.  Toileting from bedside commode with Moderate Assistance for hygiene and clothing management.   Toilet transfer to bedside commode with Minimal Assistance .     Outcome: Ongoing (interventions implemented as appropriate)  Goals remain appropriate. Cont POC.    FRANCA Salazar  5/12/2018  Pager: [phone]

## 2018-05-12 NOTE — ASSESSMENT & PLAN NOTE
- Former tobacco abuse with history of COPD; concern for volume overload in ICU setting as well and underwent some diuresis. Respiratory status improving now; on 05/10 did have significant respiratory demand prompting CTA; negative for thromboembolic process.  - Continuing albuterol-ipratropium 2.5-0.5mg inhaled q6h wake.  - continue zosyn for pna for now  - Weaning O2 as tolerated, on venti mask qhs for now for some pressure support as bipap cannot be used with NG

## 2018-05-12 NOTE — PT/OT/SLP PROGRESS
Physical Therapy      Patient Name:  Ryder Boo   MRN:  [id number]    Patient not seen today secondary to pt being seen by OT first attempt. Was unable to return for second attempt. Will follow-up at next scheduled visit per PT POC.    Elysia Cabrales, SAROJ

## 2018-05-12 NOTE — CARE UPDATE
RN Proactive Rounding Note  Time of Visit: 928    Admit Date: 2018  LOS: 6  Code Status: Full Code   Date of Visit: 2018  : 1955  Age: 63 y.o.  Sex: male  Bed: [facility]5/[facility] A:   MRN: [id number]  Was the patient discharged from an ICU this admission? yes   Was the patient discharged from a PACU within last 24 hours? no  Did the patient receive conscious sedation/general anesthesia in last 24 hours? no  Was the patient in the ED within the past 24 hours? no  Was the patient started on NIPPV within the past 24 hours? yes  Attending Physician: Silver Mcfarland MD  Primary Service: [facility] HOSP MED 4      ASSESSMENT:     Abnormal Vital Signs: Decreased SaO2, pt removed NG overnight. Spoke with charge nurse, asked to assess patient. Bedside RNAdela, at bedside. Pt is confused, NAD noted. RN states plans to reinsert NG tube, no additional needs at this time.  Clinical Issues: Respiratory     INTERVENTIONS/ RECOMMENDATIONS:     Remain on unit    Discussed plan of care with Hannah    PHYSICIAN ESCALATION:     Yes/No no     Disposition: remain on unit    FOLLOW-UP/CONTINGENCY:       Call back the Rapid Response Nurse at x [phone]  for additional questions or concerns

## 2018-05-12 NOTE — NURSING
18 Fr NG placed to R nare.  Marking at 60.  Notified Radiology to come up to confirm placement via imaging.

## 2018-05-12 NOTE — SUBJECTIVE & OBJECTIVE
Interval History: Patient pulled NG, replaced. Bharathi Comfort flow at night used for pressure support as bipap cannot be used with NG, weaning during the day.      Review of Systems   Constitutional: Negative for chills and fever.   Respiratory: Positive for shortness of breath. Negative for cough.    Cardiovascular: Negative for chest pain and palpitations.   Gastrointestinal: Negative for abdominal pain, nausea and vomiting.     Objective:     Vital Signs (Most Recent):  Temp: 99.2 °F (37.3 °C) (05/12/18 1110)  Pulse: 96 (05/12/18 1300)  Resp: 16 (05/12/18 1300)  BP: (!) 159/92 (05/12/18 1110)  SpO2: (!) 94 % (05/12/18 1300) Vital Signs (24h Range):  Temp:  [98.2 °F (36.8 °C)-99.2 °F (37.3 °C)] 99.2 °F (37.3 °C)  Pulse:  [] 96  Resp:  [16-30] 16  SpO2:  [82 %-99 %] 94 %  BP: (142-166)/(77-92) 159/92     Weight: 76.2 kg (167 lb 15.9 oz)  Body mass index is 27.11 kg/m².    Intake/Output Summary (Last 24 hours) at 05/12/18 1307  Last data filed at 05/12/18 1039   Gross per 24 hour   Intake              850 ml   Output              400 ml   Net              450 ml      Physical Exam   Constitutional: He is oriented to person, place, and time. He appears well-developed. No distress.   HENT:   Head: Normocephalic and atraumatic.   Eyes: Conjunctivae are normal. Pupils are equal, round, and reactive to light.   Cardiovascular: Normal rate, regular rhythm, S1 normal, S2 normal and intact distal pulses.    Pulmonary/Chest: Effort normal.   Diffuse insp crackles and exp rhonchi   Abdominal: Soft. He exhibits no distension.   Musculoskeletal: Normal range of motion. He exhibits no edema.   Neurological: He is alert and oriented to person, place, and time.   Skin: Skin is warm and dry.   Nursing note and vitals reviewed.    Significant Labs:   Recent Results (from the past 24 hour(s))   Magnesium    Collection Time: 05/12/18  6:49 AM   Result Value Ref Range    Magnesium 1.6 1.6 - 2.6 mg/dL   CBC auto differential     Collection Time: 05/12/18  6:49 AM   Result Value Ref Range    WBC 8.61 3.90 - 12.70 K/uL    RBC 3.23 (L) 4.60 - 6.20 M/uL    Hemoglobin 9.5 (L) 14.0 - 18.0 g/dL    Hematocrit 32.0 (L) 40.0 - 54.0 %    MCV 99 (H) 82 - 98 fL    MCH 29.4 27.0 - 31.0 pg    MCHC 29.7 (L) 32.0 - 36.0 g/dL    RDW 13.6 11.5 - 14.5 %    Platelets 342 150 - 350 K/uL    MPV 9.7 9.2 - 12.9 fL    Immature Granulocytes 1.6 (H) 0.0 - 0.5 %    Gran # (ANC) 7.2 1.8 - 7.7 K/uL    Immature Grans (Abs) 0.14 (H) 0.00 - 0.04 K/uL    Lymph # 0.7 (L) 1.0 - 4.8 K/uL    Mono # 0.6 0.3 - 1.0 K/uL    Eos # 0.0 0.0 - 0.5 K/uL    Baso # 0.02 0.00 - 0.20 K/uL    nRBC 0 0 /100 WBC    Gran% 83.2 (H) 38.0 - 73.0 %    Lymph% 7.7 (L) 18.0 - 48.0 %    Mono% 7.0 4.0 - 15.0 %    Eosinophil% 0.3 0.0 - 8.0 %    Basophil% 0.2 0.0 - 1.9 %    Differential Method Automated    Comprehensive metabolic panel    Collection Time: 05/12/18  6:49 AM   Result Value Ref Range    Sodium 152 (H) 136 - 145 mmol/L    Potassium 3.1 (L) 3.5 - 5.1 mmol/L    Chloride 100 95 - 110 mmol/L    CO2 41 (HH) 23 - 29 mmol/L    Glucose 88 70 - 110 mg/dL    BUN, Bld 27 (H) 8 - 23 mg/dL    Creatinine 1.1 0.5 - 1.4 mg/dL    Calcium 9.5 8.7 - 10.5 mg/dL    Total Protein 7.6 6.0 - 8.4 g/dL    Albumin 2.1 (L) 3.5 - 5.2 g/dL    Total Bilirubin 0.4 0.1 - 1.0 mg/dL    Alkaline Phosphatase 46 (L) 55 - 135 U/L    AST 17 10 - 40 U/L    ALT 9 (L) 10 - 44 U/L    Anion Gap 11 8 - 16 mmol/L    eGFR if African American >60.0 >60 mL/min/1.73 m^2    eGFR if non African American >60.0 >60 mL/min/1.73 m^2   Sodium    Collection Time: 05/12/18 10:58 AM   Result Value Ref Range    Sodium 152 (H) 136 - 145 mmol/L     Significant Imaging:   No new imaging this morning.

## 2018-05-12 NOTE — PROGRESS NOTES
[facility] Hospital Medicine  Progress Note    Patient Name: Ryder Boo  MRN: [id number]  Patient Class: IP- Inpatient   Admission Date: 5/6/2018  Length of Stay: 6 days  Attending Physician: Silver Mcfarland MD  Primary Care Provider: Swapnil Heart MD    [facility] Medicine Team: [facility] HOSP MED 4 Eladia Barbour MD    Subjective:     Principal Problem:Aspiration pneumonia    HPI:  The patient is a 64 y/o M new to our system, with HTN, COPD and alcoholism, who is transfer from [facility] ICU to [facility] on mechanical ventilation.     The patient lives with his brother across the street from his sister. He was found unresponsive in a chair with small amount of blood in his mouth by his nephew and taken to the hospital by EMS on 5/3. Patient drinks every day and has been having frequent falls recently. Per sister he has quit smoking 15 years ago.  In the ED at OSH he was found to be hypoxic and hypotensive, with undetectable EtOH levels, leukocytosis (24K) and hyponatremia (119) in the labs. he received narcan x1, IV fluids, started on metronidazole and ceftriaxone (also x1 of zosyn, azithromycin, vancomycin, ceftaroline) for aspiration pneumonia, and was put on 15L of Oxygen with non-rebreather, which improved his mental status and BP, however on 4/5 upon weaning off of oxygen he developed hypoxic hypercapnic respiratory failure and subsequent acidosis (pH 7.10), therefore he was intubated and transferred to the ICU. Patient arrived to [facility] on dopamine gtt which per notes was started to maintain BP in the setting of hyponatremia.     Hospital Course:  5/6: upon arrival, patient was taken off of dopamine gtt and received 1.5L IV fluids to maintain MAPs. Continued on vanc/zosyn/azithromycin. Hyponatremia and ROHITH continued to improved with IVF. He was extubated in early afternoon and maintained normal O2 saturations on 3-4L oxygen on NC.   5/7: early morning he was noticed to be rattling while  breathing by the nurse. Upon examination he had increased work of breathing with worsening of bilateral rhonchi. Repeat CXR revealed interval worsening of the patchy infiltrations. Received x1 furosemide 60 mg IV and transitioned to high flow oxygen for possible development of ARDS, he is currently maintaining normal saturations, will keep on intubation watch.  5/8: several episodes of desaturation down to 70% overnight and early morning, that responded well to increasing oxygen to 100%, currently on 35L/50%, mildly drowsy due to precedex. Responded well to diuretics, net -2.8L. Will discontinue precedex and will have speech evaluate his swallowing.  5/9: Uneventful night off precedex. Remains confused, oriented to person and place. Oxygen requirements greatly improved 15L/50% with plan to wean to low-flow NC today. Diuresed -2.9L yesterday and discontinued lasix. Discontinued azithromycin and continued on zosyn. Failed speech eval by SLP and remains NPO  5/11-5/12 patient had NG tube placed, removed it and re-ordered on gentle tube feeds. On high o2 support at night as patient cannot have bipap due to NG.    Interval History: Patient pulled NG, replaced. Bharathi Comfort flow at night used for pressure support as bipap cannot be used with NG, weaning during the day.      Review of Systems   Constitutional: Negative for chills and fever.   Respiratory: Positive for shortness of breath. Negative for cough.    Cardiovascular: Negative for chest pain and palpitations.   Gastrointestinal: Negative for abdominal pain, nausea and vomiting.     Objective:     Vital Signs (Most Recent):  Temp: 99.2 °F (37.3 °C) (05/12/18 1110)  Pulse: 96 (05/12/18 1300)  Resp: 16 (05/12/18 1300)  BP: (!) 159/92 (05/12/18 1110)  SpO2: (!) 94 % (05/12/18 1300) Vital Signs (24h Range):  Temp:  [98.2 °F (36.8 °C)-99.2 °F (37.3 °C)] 99.2 °F (37.3 °C)  Pulse:  [] 96  Resp:  [16-30] 16  SpO2:  [82 %-99 %] 94 %  BP: (142-166)/(77-92) 159/92      Weight: 76.2 kg (167 lb 15.9 oz)  Body mass index is 27.11 kg/m².    Intake/Output Summary (Last 24 hours) at 05/12/18 1307  Last data filed at 05/12/18 1039   Gross per 24 hour   Intake              850 ml   Output              400 ml   Net              450 ml      Physical Exam   Constitutional: He is oriented to person, place, and time. He appears well-developed. No distress.   HENT:   Head: Normocephalic and atraumatic.   Eyes: Conjunctivae are normal. Pupils are equal, round, and reactive to light.   Cardiovascular: Normal rate, regular rhythm, S1 normal, S2 normal and intact distal pulses.    Pulmonary/Chest: Effort normal.   Diffuse insp crackles and exp rhonchi   Abdominal: Soft. He exhibits no distension.   Musculoskeletal: Normal range of motion. He exhibits no edema.   Neurological: He is alert and oriented to person, place, and time.   Skin: Skin is warm and dry.   Nursing note and vitals reviewed.    Significant Labs:   Recent Results (from the past 24 hour(s))   Magnesium    Collection Time: 05/12/18  6:49 AM   Result Value Ref Range    Magnesium 1.6 1.6 - 2.6 mg/dL   CBC auto differential    Collection Time: 05/12/18  6:49 AM   Result Value Ref Range    WBC 8.61 3.90 - 12.70 K/uL    RBC 3.23 (L) 4.60 - 6.20 M/uL    Hemoglobin 9.5 (L) 14.0 - 18.0 g/dL    Hematocrit 32.0 (L) 40.0 - 54.0 %    MCV 99 (H) 82 - 98 fL    MCH 29.4 27.0 - 31.0 pg    MCHC 29.7 (L) 32.0 - 36.0 g/dL    RDW 13.6 11.5 - 14.5 %    Platelets 342 150 - 350 K/uL    MPV 9.7 9.2 - 12.9 fL    Immature Granulocytes 1.6 (H) 0.0 - 0.5 %    Gran # (ANC) 7.2 1.8 - 7.7 K/uL    Immature Grans (Abs) 0.14 (H) 0.00 - 0.04 K/uL    Lymph # 0.7 (L) 1.0 - 4.8 K/uL    Mono # 0.6 0.3 - 1.0 K/uL    Eos # 0.0 0.0 - 0.5 K/uL    Baso # 0.02 0.00 - 0.20 K/uL    nRBC 0 0 /100 WBC    Gran% 83.2 (H) 38.0 - 73.0 %    Lymph% 7.7 (L) 18.0 - 48.0 %    Mono% 7.0 4.0 - 15.0 %    Eosinophil% 0.3 0.0 - 8.0 %    Basophil% 0.2 0.0 - 1.9 %    Differential Method  Automated    Comprehensive metabolic panel    Collection Time: 05/12/18  6:49 AM   Result Value Ref Range    Sodium 152 (H) 136 - 145 mmol/L    Potassium 3.1 (L) 3.5 - 5.1 mmol/L    Chloride 100 95 - 110 mmol/L    CO2 41 (HH) 23 - 29 mmol/L    Glucose 88 70 - 110 mg/dL    BUN, Bld 27 (H) 8 - 23 mg/dL    Creatinine 1.1 0.5 - 1.4 mg/dL    Calcium 9.5 8.7 - 10.5 mg/dL    Total Protein 7.6 6.0 - 8.4 g/dL    Albumin 2.1 (L) 3.5 - 5.2 g/dL    Total Bilirubin 0.4 0.1 - 1.0 mg/dL    Alkaline Phosphatase 46 (L) 55 - 135 U/L    AST 17 10 - 40 U/L    ALT 9 (L) 10 - 44 U/L    Anion Gap 11 8 - 16 mmol/L    eGFR if African American >60.0 >60 mL/min/1.73 m^2    eGFR if non African American >60.0 >60 mL/min/1.73 m^2   Sodium    Collection Time: 05/12/18 10:58 AM   Result Value Ref Range    Sodium 152 (H) 136 - 145 mmol/L     Significant Imaging:   No new imaging this morning.    Assessment/Plan:      * Aspiration pneumonia    - At OSH had significant leukocytosis with WBCs to 38K.  - CXR with bilateral multifocal infiltration.  - Suspect aspiration secondary to AMS.  - Continuing piperacillin-tazobactam 4.5g IV q8hr; discontinue vancomycin 5/11        Hypernatremia    Na trending up as patient NPO  Starting TF  Free water 250 TID  Giving D5W 125cc/hr for 10 hrs 5/12  Q6hr sodium 5/12          Hypomagnesemia    - repleting daily           Hypokalemia    - repleting daily           Anemia    - Hgb holding stable; some concern for Hgb drop as outpatient. FOBT performed and positive; no gross evidence of bleed.  - Will continue to monitor.        Encephalopathy, metabolic    - Suspected secondary to sepsis and respiratory status; improving.        Acute respiratory failure with hypoxia and hypercapnia    - Former tobacco abuse with history of COPD; concern for volume overload in ICU setting as well and underwent some diuresis. Respiratory status improving now; on 05/10 did have significant respiratory demand prompting CTA; negative for  thromboembolic process.  - Continuing albuterol-ipratropium 2.5-0.5mg inhaled q6h wake.  - continue zosyn for pna for now  - Weaning O2 as tolerated, on venti mask qhs for now for some pressure support as bipap cannot be used with NG        Sepsis    - Secondary to aspiration PNA; improved.        Alcoholism /alcohol abuse    - History of alcohol abuse; continuing folic acid, thiamine daily.        ROHITH (acute kidney injury)    - Unclear baseline; Cr. holding stable.          VTE Risk Mitigation         Ordered     enoxaparin injection 40 mg  Daily      05/06/18 1016     Place sequential compression device  Until discontinued      05/06/18 0113     IP VTE LOW RISK PATIENT  Once      05/06/18 0113              Eladia Barbour MD  Department of Hospital Medicine   [facility]

## 2018-05-13 PROBLEM — E87.0 HYPERNATREMIA: Status: ACTIVE | Noted: 2018-05-13

## 2018-05-13 LAB
ALBUMIN SERPL BCP-MCNC: 2 G/DL
ALP SERPL-CCNC: 42 U/L
ALT SERPL W/O P-5'-P-CCNC: 9 U/L
ANION GAP SERPL CALC-SCNC: 13 MMOL/L
ANION GAP SERPL CALC-SCNC: 9 MMOL/L
AST SERPL-CCNC: 24 U/L
BASOPHILS # BLD AUTO: 0.02 K/UL
BASOPHILS NFR BLD: 0.2 %
BILIRUB SERPL-MCNC: 0.5 MG/DL
BUN SERPL-MCNC: 17 MG/DL
BUN SERPL-MCNC: 22 MG/DL
CALCIUM SERPL-MCNC: 8.5 MG/DL
CALCIUM SERPL-MCNC: 8.9 MG/DL
CHLORIDE SERPL-SCNC: 100 MMOL/L
CHLORIDE SERPL-SCNC: 101 MMOL/L
CO2 SERPL-SCNC: 32 MMOL/L
CO2 SERPL-SCNC: 34 MMOL/L
CREAT SERPL-MCNC: 0.9 MG/DL
CREAT SERPL-MCNC: 1 MG/DL
DIFFERENTIAL METHOD: ABNORMAL
EOSINOPHIL # BLD AUTO: 0.1 K/UL
EOSINOPHIL NFR BLD: 1.4 %
ERYTHROCYTE [DISTWIDTH] IN BLOOD BY AUTOMATED COUNT: 13.7 %
EST. GFR  (AFRICAN AMERICAN): >60 ML/MIN/1.73 M^2
EST. GFR  (AFRICAN AMERICAN): >60 ML/MIN/1.73 M^2
EST. GFR  (NON AFRICAN AMERICAN): >60 ML/MIN/1.73 M^2
EST. GFR  (NON AFRICAN AMERICAN): >60 ML/MIN/1.73 M^2
GLUCOSE SERPL-MCNC: 102 MG/DL
GLUCOSE SERPL-MCNC: 122 MG/DL
HCT VFR BLD AUTO: 30.4 %
HGB BLD-MCNC: 9.6 G/DL
IMM GRANULOCYTES # BLD AUTO: 0.07 K/UL
IMM GRANULOCYTES NFR BLD AUTO: 0.8 %
LYMPHOCYTES # BLD AUTO: 0.7 K/UL
LYMPHOCYTES NFR BLD: 7.6 %
MAGNESIUM SERPL-MCNC: 1.5 MG/DL
MCH RBC QN AUTO: 30.3 PG
MCHC RBC AUTO-ENTMCNC: 31.6 G/DL
MCV RBC AUTO: 96 FL
MONOCYTES # BLD AUTO: 0.5 K/UL
MONOCYTES NFR BLD: 6 %
NEUTROPHILS # BLD AUTO: 7.3 K/UL
NEUTROPHILS NFR BLD: 84 %
NRBC BLD-RTO: 0 /100 WBC
PLATELET # BLD AUTO: 333 K/UL
PMV BLD AUTO: 10 FL
POTASSIUM SERPL-SCNC: 2.6 MMOL/L
POTASSIUM SERPL-SCNC: 2.9 MMOL/L
PROT SERPL-MCNC: 7.1 G/DL
RBC # BLD AUTO: 3.17 M/UL
SODIUM SERPL-SCNC: 144 MMOL/L
SODIUM SERPL-SCNC: 145 MMOL/L
SODIUM SERPL-SCNC: 145 MMOL/L
WBC # BLD AUTO: 8.71 K/UL

## 2018-05-13 PROCEDURE — 36415 COLL VENOUS BLD VENIPUNCTURE: CPT

## 2018-05-13 PROCEDURE — 25000242 PHARM REV CODE 250 ALT 637 W/ HCPCS: Performed by: INTERNAL MEDICINE

## 2018-05-13 PROCEDURE — 63600175 PHARM REV CODE 636 W HCPCS: Performed by: STUDENT IN AN ORGANIZED HEALTH CARE EDUCATION/TRAINING PROGRAM

## 2018-05-13 PROCEDURE — 94761 N-INVAS EAR/PLS OXIMETRY MLT: CPT

## 2018-05-13 PROCEDURE — 99232 SBSQ HOSP IP/OBS MODERATE 35: CPT | Mod: ,,, | Performed by: HOSPITALIST

## 2018-05-13 PROCEDURE — 83735 ASSAY OF MAGNESIUM: CPT

## 2018-05-13 PROCEDURE — 85025 COMPLETE CBC W/AUTO DIFF WBC: CPT

## 2018-05-13 PROCEDURE — 94640 AIRWAY INHALATION TREATMENT: CPT

## 2018-05-13 PROCEDURE — 27100171 HC OXYGEN HIGH FLOW UP TO 24 HOURS

## 2018-05-13 PROCEDURE — 27000221 HC OXYGEN, UP TO 24 HOURS

## 2018-05-13 PROCEDURE — 80053 COMPREHEN METABOLIC PANEL: CPT

## 2018-05-13 PROCEDURE — 25000003 PHARM REV CODE 250: Performed by: STUDENT IN AN ORGANIZED HEALTH CARE EDUCATION/TRAINING PROGRAM

## 2018-05-13 PROCEDURE — 20600001 HC STEP DOWN PRIVATE ROOM

## 2018-05-13 PROCEDURE — 80048 BASIC METABOLIC PNL TOTAL CA: CPT

## 2018-05-13 PROCEDURE — 27100092 HC HIGH FLOW DELIVERY CANNULA

## 2018-05-13 RX ORDER — POTASSIUM CHLORIDE 7.45 MG/ML
10 INJECTION INTRAVENOUS
Status: COMPLETED | OUTPATIENT
Start: 2018-05-13 | End: 2018-05-14

## 2018-05-13 RX ORDER — MAGNESIUM SULFATE HEPTAHYDRATE 40 MG/ML
2 INJECTION, SOLUTION INTRAVENOUS ONCE
Status: COMPLETED | OUTPATIENT
Start: 2018-05-13 | End: 2018-05-13

## 2018-05-13 RX ORDER — POTASSIUM CHLORIDE 7.45 MG/ML
10 INJECTION INTRAVENOUS
Status: DISCONTINUED | OUTPATIENT
Start: 2018-05-13 | End: 2018-05-13

## 2018-05-13 RX ORDER — DEXTROSE MONOHYDRATE, SODIUM CHLORIDE, AND POTASSIUM CHLORIDE 50; 1.49; 4.5 G/1000ML; G/1000ML; G/1000ML
INJECTION, SOLUTION INTRAVENOUS CONTINUOUS
Status: DISPENSED | OUTPATIENT
Start: 2018-05-13 | End: 2018-05-14

## 2018-05-13 RX ORDER — DEXTROSE MONOHYDRATE, SODIUM CHLORIDE, AND POTASSIUM CHLORIDE 50; .745; 4.5 G/1000ML; G/1000ML; G/1000ML
INJECTION, SOLUTION INTRAVENOUS CONTINUOUS
Status: DISCONTINUED | OUTPATIENT
Start: 2018-05-13 | End: 2018-05-13

## 2018-05-13 RX ADMIN — THIAMINE HYDROCHLORIDE 100 MG: 100 INJECTION, SOLUTION INTRAMUSCULAR; INTRAVENOUS at 08:05

## 2018-05-13 RX ADMIN — ENOXAPARIN SODIUM 40 MG: 100 INJECTION SUBCUTANEOUS at 04:05

## 2018-05-13 RX ADMIN — MAGNESIUM SULFATE IN WATER 2 G: 40 INJECTION, SOLUTION INTRAVENOUS at 10:05

## 2018-05-13 RX ADMIN — IPRATROPIUM BROMIDE AND ALBUTEROL SULFATE 3 ML: .5; 3 SOLUTION RESPIRATORY (INHALATION) at 08:05

## 2018-05-13 RX ADMIN — PIPERACILLIN AND TAZOBACTAM 4.5 G: 4; .5 INJECTION, POWDER, LYOPHILIZED, FOR SOLUTION INTRAVENOUS; PARENTERAL at 12:05

## 2018-05-13 RX ADMIN — IPRATROPIUM BROMIDE AND ALBUTEROL SULFATE 3 ML: .5; 3 SOLUTION RESPIRATORY (INHALATION) at 12:05

## 2018-05-13 RX ADMIN — DEXTROSE MONOHYDRATE, SODIUM CHLORIDE, AND POTASSIUM CHLORIDE: 50; 4.5; 1.49 INJECTION, SOLUTION INTRAVENOUS at 10:05

## 2018-05-13 RX ADMIN — DEXTROSE MONOHYDRATE, SODIUM CHLORIDE, AND POTASSIUM CHLORIDE: 50; 4.5; 1.49 INJECTION, SOLUTION INTRAVENOUS at 11:05

## 2018-05-13 RX ADMIN — FOLIC ACID 1 MG: 5 INJECTION, SOLUTION INTRAMUSCULAR; INTRAVENOUS; SUBCUTANEOUS at 08:05

## 2018-05-13 RX ADMIN — POTASSIUM CHLORIDE 10 MEQ: 7.46 INJECTION, SOLUTION INTRAVENOUS at 10:05

## 2018-05-13 RX ADMIN — POTASSIUM CHLORIDE 10 MEQ: 7.46 INJECTION, SOLUTION INTRAVENOUS at 11:05

## 2018-05-13 RX ADMIN — POTASSIUM CHLORIDE 10 MEQ: 7.46 INJECTION, SOLUTION INTRAVENOUS at 09:05

## 2018-05-13 RX ADMIN — PIPERACILLIN AND TAZOBACTAM 4.5 G: 4; .5 INJECTION, POWDER, LYOPHILIZED, FOR SOLUTION INTRAVENOUS; PARENTERAL at 07:05

## 2018-05-13 RX ADMIN — PIPERACILLIN AND TAZOBACTAM 4.5 G: 4; .5 INJECTION, POWDER, LYOPHILIZED, FOR SOLUTION INTRAVENOUS; PARENTERAL at 03:05

## 2018-05-13 NOTE — PLAN OF CARE
Problem: Patient Care Overview  Goal: Plan of Care Review  Dx: Pneumonia       Outcome: Ongoing (interventions implemented as appropriate)    No acute events.  Patient AAOx2 resting comfortably in room, calm and in no distress.  Standard precautions maintained and patient remains afebrile.  IV Abx continued.  Continuous pulse ox.   Patient very coarse.  Speech garbled but conversing conversing linearly.    NGT remained out over shift per Dr. Mcfarland's instruction.  D5 1/2 NS + 20K @ 100mL/hr (rather than 5 hours worth of riders) ordered for K of 2.9.  Placing midline consult and end of day due to poor peripheral access.  Unable to wean off of 40L 100% Comfort Flow due to desat issues (parameters 88% per COPD).  Dr. Mcfarland performed very general bedside swallow exam and patient failed.   Incontinence care provided, wound care provided to sacrum.  Side rails up x2, call light in reach, bed alarm set.  WCTM.

## 2018-05-13 NOTE — ASSESSMENT & PLAN NOTE
- Former tobacco abuse with history of COPD; concern for volume overload in ICU setting as well and underwent some diuresis. Respiratory status improving now; on 05/10 did have significant respiratory demand prompting CTA; negative for thromboembolic process.  - Continuing albuterol-ipratropium 2.5-0.5mg inhaled q6h wake.  - continue zosyn for pna for now  - Weaning O2 as tolerated, on venti mask qhs for now for some pressure support

## 2018-05-13 NOTE — NURSING
Made midnight rounds and found that pt had removed his NGT.  I stopped the feeds, assessed the pt, and paged [facility] Med 4.  Spoke w/ Dr. Soto who instructed me to leave the NGT out, hold his tube feedings and that the team would decide if he needs another NGT in the morning.

## 2018-05-13 NOTE — PROGRESS NOTES
[facility] Hospital Medicine  Progress Note    Patient Name: Ryder Boo  MRN: [id number]  Patient Class: IP- Inpatient   Admission Date: 5/6/2018  Length of Stay: 7 days  Attending Physician: Silver Mcfarland MD  Primary Care Provider: Swapnil Heart MD    [facility] Medicine Team: [facility]OS[facility] MED 4 Mariaelena Luna MD    Subjective:     Principal Problem:Aspiration pneumonia    HPI:  The patient is a 64 y/o M new to our system, with HTN, COPD and alcoholism, who is transfer from [facility] ICU to [facility] on mechanical ventilation.     The patient lives with his brother across the street from his sister. He was found unresponsive in a chair with small amount of blood in his mouth by his nephew and taken to the hospital by EMS on 5/3. Patient drinks every day and has been having frequent falls recently. Per sister he has quit smoking 15 years ago.  In the ED at OSH he was found to be hypoxic and hypotensive, with undetectable EtOH levels, leukocytosis (24K) and hyponatremia (119) in the labs. he received narcan x1, IV fluids, started on metronidazole and ceftriaxone (also x1 of zosyn, azithromycin, vancomycin, ceftaroline) for aspiration pneumonia, and was put on 15L of Oxygen with non-rebreather, which improved his mental status and BP, however on 4/5 upon weaning off of oxygen he developed hypoxic hypercapnic respiratory failure and subsequent acidosis (pH 7.10), therefore he was intubated and transferred to the ICU. Patient arrived to [facility] on dopamine gtt which per notes was started to maintain BP in the setting of hyponatremia.     Hospital Course:  5/6: upon arrival, patient was taken off of dopamine gtt and received 1.5L IV fluids to maintain MAPs. Continued on vanc/zosyn/azithromycin. Hyponatremia and ROHITH continued to improved with IVF. He was extubated in early afternoon and maintained normal O2 saturations on 3-4L oxygen on NC.   5/7: early morning he was noticed to be rattling while  breathing by the nurse. Upon examination he had increased work of breathing with worsening of bilateral rhonchi. Repeat CXR revealed interval worsening of the patchy infiltrations. Received x1 furosemide 60 mg IV and transitioned to high flow oxygen for possible development of ARDS, he is currently maintaining normal saturations, will keep on intubation watch.  5/8: several episodes of desaturation down to 70% overnight and early morning, that responded well to increasing oxygen to 100%, currently on 35L/50%, mildly drowsy due to precedex. Responded well to diuretics, net -2.8L. Will discontinue precedex and will have speech evaluate his swallowing.  5/9: Uneventful night off precedex. Remains confused, oriented to person and place. Oxygen requirements greatly improved 15L/50% with plan to wean to low-flow NC today. Diuresed -2.9L yesterday and discontinued lasix. Discontinued azithromycin and continued on zosyn. Failed speech eval by SLP and remains NPO  5/11-5/12 patient had NG tube placed, removed it and re-ordered on gentle tube feeds. On high o2 support at night as patient cannot have bipap due to NG.    Interval History: Patient pulled NG tube again, he states it bothers him. He also has a hard NG placement per nursing its difficult and he bleeds. Attempted bedside swallow during rounds but patient was coughing. Remains needing high flow.  Review of Systems   Constitutional: Negative for chills and fever.   Respiratory: Positive for shortness of breath. Negative for cough.    Cardiovascular: Negative for chest pain and palpitations.   Gastrointestinal: Negative for abdominal pain, nausea and vomiting.     Objective:     Vital Signs (Most Recent):  Temp: 98.2 °F (36.8 °C) (05/13/18 0759)  Pulse: 90 (05/13/18 0850)  Resp: 18 (05/13/18 0850)  BP: (!) 151/81 (05/13/18 0759)  SpO2: (!) 88 % (05/13/18 0850) Vital Signs (24h Range):  Temp:  [98.2 °F (36.8 °C)-98.8 °F (37.1 °C)] 98.2 °F (36.8 °C)  Pulse:  []  90  Resp:  [15-22] 18  SpO2:  [75 %-100 %] 88 %  BP: (137-155)/(76-81) 151/81     Weight: 76.2 kg (167 lb 15.9 oz)  Body mass index is 27.11 kg/m².    Intake/Output Summary (Last 24 hours) at 05/13/18 1119  Last data filed at 05/12/18 2000   Gross per 24 hour   Intake          1593.75 ml   Output              700 ml   Net           893.75 ml      Physical Exam   Constitutional: He is oriented to person, place, and time. He appears well-developed. No distress.   HENT:   Head: Normocephalic and atraumatic.   Eyes: Conjunctivae are normal. Pupils are equal, round, and reactive to light.   Cardiovascular: Normal rate, regular rhythm, S1 normal, S2 normal and intact distal pulses.    Pulmonary/Chest: Effort normal.   Diffuse insp crackles and exp rhonchi   Abdominal: Soft. He exhibits no distension.   Musculoskeletal: Normal range of motion. He exhibits no edema.   Neurological: He is alert and oriented to person, place, and time.   Skin: Skin is warm and dry.   Nursing note and vitals reviewed.      Significant Labs:   CBC:   Recent Labs  Lab 05/12/18  0649 05/13/18  0557   WBC 8.61 8.71   HGB 9.5* 9.6*   HCT 32.0* 30.4*    333     CMP:   Recent Labs  Lab 05/12/18  0649  05/12/18  1910 05/12/18  2319 05/13/18  0557   *  < > 149* 145 145   K 3.1*  --   --   --  2.9*     --   --   --  100   CO2 41*  --   --   --  32*   GLU 88  --   --   --  102   BUN 27*  --   --   --  22   CREATININE 1.1  --   --   --  1.0   CALCIUM 9.5  --   --   --  8.9   PROT 7.6  --   --   --  7.1   ALBUMIN 2.1*  --   --   --  2.0*   BILITOT 0.4  --   --   --  0.5   ALKPHOS 46*  --   --   --  42*   AST 17  --   --   --  24   ALT 9*  --   --   --  9*   ANIONGAP 11  --   --   --  13   EGFRNONAA >60.0  --   --   --  >60.0   < > = values in this interval not displayed.  All pertinent labs within the past 24 hours have been reviewed.    Significant Imaging: I have reviewed and interpreted all pertinent imaging results/findings within  the past 24 hours.    Assessment/Plan:      * Aspiration pneumonia    - At OSH had significant leukocytosis with WBCs to 38K.  - CXR with bilateral multifocal infiltration.  - Suspect aspiration secondary to AMS.  - Continuing piperacillin-tazobactam 4.5g IV q8hr; discontinue vancomycin 5/11        Hypernatremia    Na normalized  OFF TF as patient pulled NG tube   Giving D5W 125cc/hr for 16 hrs 5/13  BMP at 6 pm   Will give him NS since he is npo           Moderate malnutrition    - if patient does not pass swallow eval tomorrow will consider PPN          Hypomagnesemia    - repleting daily           Hypokalemia    - repleting daily           Anemia    - Hgb holding stable; some concern for Hgb drop as outpatient. FOBT performed and positive; no gross evidence of bleed.  - Will continue to monitor.        Encephalopathy, metabolic    - Suspected secondary to sepsis and respiratory status; improving.        Acute respiratory failure with hypoxia and hypercapnia    - Former tobacco abuse with history of COPD; concern for volume overload in ICU setting as well and underwent some diuresis. Respiratory status improving now; on 05/10 did have significant respiratory demand prompting CTA; negative for thromboembolic process.  - Continuing albuterol-ipratropium 2.5-0.5mg inhaled q6h wake.  - continue zosyn for pna for now  - Weaning O2 as tolerated, on venti mask qhs for now for some pressure support          Sepsis    - Secondary to aspiration PNA; improved.        Alcoholism /alcohol abuse    - History of alcohol abuse; continuing folic acid, thiamine daily.        ROHITH (acute kidney injury)    - Unclear baseline; Cr. holding stable.          VTE Risk Mitigation         Ordered     enoxaparin injection 40 mg  Daily      05/06/18 1016     Place sequential compression device  Until discontinued      05/06/18 0113     IP VTE LOW RISK PATIENT  Once      05/06/18 0113              Mariaelena Luna MD  Department of Hospital  Medicine   [facility]

## 2018-05-13 NOTE — SUBJECTIVE & OBJECTIVE
Interval History: Patient pulled NG tube again, he states it bothers him. He also has a hard NG placement per nursing its difficult and he bleeds. Attempted bedside swallow during rounds but patient was coughing. Remains needing high flow.  Review of Systems   Constitutional: Negative for chills and fever.   Respiratory: Positive for shortness of breath. Negative for cough.    Cardiovascular: Negative for chest pain and palpitations.   Gastrointestinal: Negative for abdominal pain, nausea and vomiting.     Objective:     Vital Signs (Most Recent):  Temp: 98.2 °F (36.8 °C) (05/13/18 0759)  Pulse: 90 (05/13/18 0850)  Resp: 18 (05/13/18 0850)  BP: (!) 151/81 (05/13/18 0759)  SpO2: (!) 88 % (05/13/18 0850) Vital Signs (24h Range):  Temp:  [98.2 °F (36.8 °C)-98.8 °F (37.1 °C)] 98.2 °F (36.8 °C)  Pulse:  [] 90  Resp:  [15-22] 18  SpO2:  [75 %-100 %] 88 %  BP: (137-155)/(76-81) 151/81     Weight: 76.2 kg (167 lb 15.9 oz)  Body mass index is 27.11 kg/m².    Intake/Output Summary (Last 24 hours) at 05/13/18 1119  Last data filed at 05/12/18 2000   Gross per 24 hour   Intake          1593.75 ml   Output              700 ml   Net           893.75 ml      Physical Exam   Constitutional: He is oriented to person, place, and time. He appears well-developed. No distress.   HENT:   Head: Normocephalic and atraumatic.   Eyes: Conjunctivae are normal. Pupils are equal, round, and reactive to light.   Cardiovascular: Normal rate, regular rhythm, S1 normal, S2 normal and intact distal pulses.    Pulmonary/Chest: Effort normal.   Diffuse insp crackles and exp rhonchi   Abdominal: Soft. He exhibits no distension.   Musculoskeletal: Normal range of motion. He exhibits no edema.   Neurological: He is alert and oriented to person, place, and time.   Skin: Skin is warm and dry.   Nursing note and vitals reviewed.      Significant Labs:   CBC:   Recent Labs  Lab 05/12/18  0649 05/13/18  0557   WBC 8.61 8.71   HGB 9.5* 9.6*   HCT 32.0*  30.4*    333     CMP:   Recent Labs  Lab 05/12/18  0649  05/12/18  1910 05/12/18  2319 05/13/18  0557   *  < > 149* 145 145   K 3.1*  --   --   --  2.9*     --   --   --  100   CO2 41*  --   --   --  32*   GLU 88  --   --   --  102   BUN 27*  --   --   --  22   CREATININE 1.1  --   --   --  1.0   CALCIUM 9.5  --   --   --  8.9   PROT 7.6  --   --   --  7.1   ALBUMIN 2.1*  --   --   --  2.0*   BILITOT 0.4  --   --   --  0.5   ALKPHOS 46*  --   --   --  42*   AST 17  --   --   --  24   ALT 9*  --   --   --  9*   ANIONGAP 11  --   --   --  13   EGFRNONAA >60.0  --   --   --  >60.0   < > = values in this interval not displayed.  All pertinent labs within the past 24 hours have been reviewed.    Significant Imaging: I have reviewed and interpreted all pertinent imaging results/findings within the past 24 hours.

## 2018-05-13 NOTE — PT/OT/SLP PROGRESS
Physical Therapy      Patient Name:  Ryder Boo   MRN:  [id number]    Patient not seen today secondary to pt in care of nursing. Was unable to return for second attempt. Will follow-up at next scheduled visit per PT POC.    Elysia Cabrales, SAROJ

## 2018-05-14 PROBLEM — Z74.09 IMPAIRED MOBILITY AND ADLS: Status: ACTIVE | Noted: 2018-05-14

## 2018-05-14 PROBLEM — Z78.9 IMPAIRED MOBILITY AND ADLS: Status: ACTIVE | Noted: 2018-05-14

## 2018-05-14 PROBLEM — L89.93 PRESSURE INJURY OF SKIN AND SUBCUTANEOUS TISSUE, STAGE 3: Status: ACTIVE | Noted: 2018-05-14

## 2018-05-14 LAB
ALBUMIN SERPL BCP-MCNC: 1.9 G/DL
ALP SERPL-CCNC: 39 U/L
ALT SERPL W/O P-5'-P-CCNC: 9 U/L
ANION GAP SERPL CALC-SCNC: 9 MMOL/L
AST SERPL-CCNC: 18 U/L
BASOPHILS # BLD AUTO: 0.02 K/UL
BASOPHILS NFR BLD: 0.3 %
BILIRUB SERPL-MCNC: 0.5 MG/DL
BUN SERPL-MCNC: 15 MG/DL
CALCIUM SERPL-MCNC: 8.3 MG/DL
CHLORIDE SERPL-SCNC: 102 MMOL/L
CO2 SERPL-SCNC: 29 MMOL/L
CREAT SERPL-MCNC: 0.9 MG/DL
DIFFERENTIAL METHOD: ABNORMAL
EOSINOPHIL # BLD AUTO: 0.1 K/UL
EOSINOPHIL NFR BLD: 1.4 %
ERYTHROCYTE [DISTWIDTH] IN BLOOD BY AUTOMATED COUNT: 13.5 %
EST. GFR  (AFRICAN AMERICAN): >60 ML/MIN/1.73 M^2
EST. GFR  (NON AFRICAN AMERICAN): >60 ML/MIN/1.73 M^2
GLUCOSE SERPL-MCNC: 125 MG/DL
HCT VFR BLD AUTO: 27.6 %
HGB BLD-MCNC: 8.9 G/DL
IMM GRANULOCYTES # BLD AUTO: 0.03 K/UL
IMM GRANULOCYTES NFR BLD AUTO: 0.5 %
LYMPHOCYTES # BLD AUTO: 0.6 K/UL
LYMPHOCYTES NFR BLD: 9.6 %
MAGNESIUM SERPL-MCNC: 1.8 MG/DL
MCH RBC QN AUTO: 29.9 PG
MCHC RBC AUTO-ENTMCNC: 32.2 G/DL
MCV RBC AUTO: 93 FL
MONOCYTES # BLD AUTO: 0.5 K/UL
MONOCYTES NFR BLD: 7.2 %
NEUTROPHILS # BLD AUTO: 5.2 K/UL
NEUTROPHILS NFR BLD: 81 %
NRBC BLD-RTO: 0 /100 WBC
PLATELET # BLD AUTO: 329 K/UL
PMV BLD AUTO: 9.7 FL
POTASSIUM SERPL-SCNC: 2.8 MMOL/L
PROT SERPL-MCNC: 6.4 G/DL
RBC # BLD AUTO: 2.98 M/UL
SODIUM SERPL-SCNC: 140 MMOL/L
WBC # BLD AUTO: 6.36 K/UL

## 2018-05-14 PROCEDURE — 25000242 PHARM REV CODE 250 ALT 637 W/ HCPCS: Performed by: INTERNAL MEDICINE

## 2018-05-14 PROCEDURE — 83735 ASSAY OF MAGNESIUM: CPT

## 2018-05-14 PROCEDURE — 25000003 PHARM REV CODE 250: Performed by: INTERNAL MEDICINE

## 2018-05-14 PROCEDURE — 63600175 PHARM REV CODE 636 W HCPCS: Performed by: STUDENT IN AN ORGANIZED HEALTH CARE EDUCATION/TRAINING PROGRAM

## 2018-05-14 PROCEDURE — 36415 COLL VENOUS BLD VENIPUNCTURE: CPT

## 2018-05-14 PROCEDURE — 80053 COMPREHEN METABOLIC PANEL: CPT

## 2018-05-14 PROCEDURE — 97803 MED NUTRITION INDIV SUBSEQ: CPT

## 2018-05-14 PROCEDURE — 27100092 HC HIGH FLOW DELIVERY CANNULA

## 2018-05-14 PROCEDURE — 94668 MNPJ CHEST WALL SBSQ: CPT

## 2018-05-14 PROCEDURE — 94640 AIRWAY INHALATION TREATMENT: CPT

## 2018-05-14 PROCEDURE — 25000003 PHARM REV CODE 250: Performed by: STUDENT IN AN ORGANIZED HEALTH CARE EDUCATION/TRAINING PROGRAM

## 2018-05-14 PROCEDURE — 99232 SBSQ HOSP IP/OBS MODERATE 35: CPT | Mod: ,,, | Performed by: HOSPITALIST

## 2018-05-14 PROCEDURE — 99232 SBSQ HOSP IP/OBS MODERATE 35: CPT | Mod: ,,, | Performed by: NURSE PRACTITIONER

## 2018-05-14 PROCEDURE — 92526 ORAL FUNCTION THERAPY: CPT

## 2018-05-14 PROCEDURE — 97530 THERAPEUTIC ACTIVITIES: CPT

## 2018-05-14 PROCEDURE — 85025 COMPLETE CBC W/AUTO DIFF WBC: CPT

## 2018-05-14 PROCEDURE — 27100171 HC OXYGEN HIGH FLOW UP TO 24 HOURS

## 2018-05-14 PROCEDURE — 97110 THERAPEUTIC EXERCISES: CPT

## 2018-05-14 PROCEDURE — 20600001 HC STEP DOWN PRIVATE ROOM

## 2018-05-14 PROCEDURE — 94761 N-INVAS EAR/PLS OXIMETRY MLT: CPT

## 2018-05-14 RX ORDER — THIAMINE HCL 100 MG
100 TABLET ORAL DAILY
Status: DISCONTINUED | OUTPATIENT
Start: 2018-05-14 | End: 2018-06-01 | Stop reason: HOSPADM

## 2018-05-14 RX ORDER — DEXTROSE MONOHYDRATE, SODIUM CHLORIDE, AND POTASSIUM CHLORIDE 50; 1.49; 4.5 G/1000ML; G/1000ML; G/1000ML
INJECTION, SOLUTION INTRAVENOUS CONTINUOUS
Status: DISCONTINUED | OUTPATIENT
Start: 2018-05-14 | End: 2018-05-14

## 2018-05-14 RX ORDER — POTASSIUM CHLORIDE 7.45 MG/ML
10 INJECTION INTRAVENOUS
Status: DISCONTINUED | OUTPATIENT
Start: 2018-05-14 | End: 2018-05-14

## 2018-05-14 RX ORDER — FOLIC ACID 1 MG/1
1 TABLET ORAL DAILY
Status: DISCONTINUED | OUTPATIENT
Start: 2018-05-14 | End: 2018-06-01 | Stop reason: HOSPADM

## 2018-05-14 RX ADMIN — PIPERACILLIN AND TAZOBACTAM 4.5 G: 4; .5 INJECTION, POWDER, LYOPHILIZED, FOR SOLUTION INTRAVENOUS; PARENTERAL at 06:05

## 2018-05-14 RX ADMIN — PIPERACILLIN AND TAZOBACTAM 4.5 G: 4; .5 INJECTION, POWDER, LYOPHILIZED, FOR SOLUTION INTRAVENOUS; PARENTERAL at 09:05

## 2018-05-14 RX ADMIN — POTASSIUM CHLORIDE 10 MEQ: 7.46 INJECTION, SOLUTION INTRAVENOUS at 12:05

## 2018-05-14 RX ADMIN — IPRATROPIUM BROMIDE AND ALBUTEROL SULFATE 3 ML: .5; 3 SOLUTION RESPIRATORY (INHALATION) at 08:05

## 2018-05-14 RX ADMIN — DEXTROSE MONOHYDRATE, SODIUM CHLORIDE, AND POTASSIUM CHLORIDE: 50; 4.5; 1.49 INJECTION, SOLUTION INTRAVENOUS at 11:05

## 2018-05-14 RX ADMIN — PIPERACILLIN AND TAZOBACTAM 4.5 G: 4; .5 INJECTION, POWDER, LYOPHILIZED, FOR SOLUTION INTRAVENOUS; PARENTERAL at 12:05

## 2018-05-14 RX ADMIN — THIAMINE HCL TAB 100 MG 100 MG: 100 TAB at 11:05

## 2018-05-14 RX ADMIN — ENOXAPARIN SODIUM 40 MG: 100 INJECTION SUBCUTANEOUS at 04:05

## 2018-05-14 RX ADMIN — FOLIC ACID 1 MG: 5 INJECTION, SOLUTION INTRAMUSCULAR; INTRAVENOUS; SUBCUTANEOUS at 09:05

## 2018-05-14 RX ADMIN — POTASSIUM CHLORIDE 10 MEQ: 7.46 INJECTION, SOLUTION INTRAVENOUS at 10:05

## 2018-05-14 NOTE — SUBJECTIVE & OBJECTIVE
Interval History:  Patient passed swallow eval to full liquids. He is now sating 100 % RA family updated awaiting rehab evaluation       Review of Systems   Constitutional: Negative for chills and fever.   Respiratory: Negative for cough and shortness of breath.    Cardiovascular: Negative for chest pain and palpitations.   Gastrointestinal: Negative for abdominal pain, nausea and vomiting.     Objective:     Vital Signs (Most Recent):  Temp: 98.4 °F (36.9 °C) (05/14/18 1139)  Pulse: 80 (05/14/18 1139)  Resp: 18 (05/14/18 1139)  BP: 137/83 (05/14/18 1139)  SpO2: 95 % (05/14/18 1139) Vital Signs (24h Range):  Temp:  [98 °F (36.7 °C)-98.7 °F (37.1 °C)] 98.4 °F (36.9 °C)  Pulse:  [74-93] 80  Resp:  [16-20] 18  SpO2:  [86 %-100 %] 95 %  BP: (122-155)/(72-89) 137/83     Weight: 76.2 kg (167 lb 15.9 oz)  Body mass index is 27.11 kg/m².    Intake/Output Summary (Last 24 hours) at 05/14/18 1258  Last data filed at 05/14/18 0604   Gross per 24 hour   Intake             2475 ml   Output              885 ml   Net             1590 ml      Physical Exam   Constitutional: He is oriented to person, place, and time. He appears well-developed. No distress.   HENT:   Head: Normocephalic and atraumatic.   Eyes: Conjunctivae are normal. Pupils are equal, round, and reactive to light.   Cardiovascular: Normal rate, regular rhythm, S1 normal, S2 normal and intact distal pulses.    Pulmonary/Chest: Effort normal.   Diffuse insp crackles and exp rhonchi   Abdominal: Soft. He exhibits no distension.   Musculoskeletal: Normal range of motion. He exhibits no edema.   Neurological: He is alert and oriented to person, place, and time.   Skin: Skin is warm and dry.   Nursing note and vitals reviewed.      Significant Labs:   CBC:   Recent Labs  Lab 05/13/18  0557 05/14/18  0330   WBC 8.71 6.36   HGB 9.6* 8.9*   HCT 30.4* 27.6*    329     CMP:   Recent Labs  Lab 05/13/18  0557 05/13/18  1914 05/14/18  0330    144 140   K 2.9* 2.6*  2.8*    101 102   CO2 32* 34* 29    122* 125*   BUN 22 17 15   CREATININE 1.0 0.9 0.9   CALCIUM 8.9 8.5* 8.3*   PROT 7.1  --  6.4   ALBUMIN 2.0*  --  1.9*   BILITOT 0.5  --  0.5   ALKPHOS 42*  --  39*   AST 24  --  18   ALT 9*  --  9*   ANIONGAP 13 9 9   EGFRNONAA >60.0 >60.0 >60.0     All pertinent labs within the past 24 hours have been reviewed.    Significant Imaging: I have reviewed and interpreted all pertinent imaging results/findings within the past 24 hours.

## 2018-05-14 NOTE — HPI
Ryder Boo is a 63-year-old male with PMHx of COPD and EtOH abuse.  Patient presented to [facility] after being found unresponsive.  On arrival, found to be hypoxic and hypotensive with undetectable EtOH levels, leukocytosis, ROHITH, and hyponatremia.  Placed on non-rebreather and given one dose of Narcan.  CXR showed bilateral multifocal infiltration.  He was then started on IV fluids and antibiotics for presumed pneumonia.  Following arrival, he developed hypoxic hypercapnic respiratory failure and acidosis requiring intubation and pressor support.  He was then transferred to [facility] on 5/6/18 for further evaluation and management.  Admitted to MICU for acute respiratory failure 2/2 suspected aspiration pneumonia.  Treated with Zosyn and Vancomycin.  Hospital course further complicated by acute encephalopathy/delirium, fluid overload requiring diuresis and high flow oxygen, and dysphagia.  Stepped down to floor on 5/9/18.    Functional History: Per patient (poor historian), he lives in [place] with his brother and nephew in a mobile home.  Prior to admission, he was (I) with ADLs and (I)-mod(I) with mobility.  Used cane PRN.  DME: cane.

## 2018-05-14 NOTE — PLAN OF CARE
Problem: Physical Therapy Goal  Goal: Physical Therapy Goal  Goals to be met by: 2018    Patient will increase functional independence with mobility by performin. Supine to sit with Moderate Assistance - MET  Revised: Supine to sit with CGA  2. Sit to supine with Moderate Assistance -  Met    Revised: Sit to supine with CGA  3. Sit to stand transfer with Moderate Assistance using LRAD or no AD - not met  4. Bed to chair transfer with Moderate Assistance using LRAD or no AD - not met  5. Gait  x 20 feet with Moderate Assistance using LRAD or no AD - not met  6. Lower extremity exercise program x20 reps per handout, with supervision - not met      Outcome: Ongoing (interventions implemented as appropriate)  2 goals met and revised on today. Goals remain appropriate.    Ana Stiles, PT, DPT  2018

## 2018-05-14 NOTE — CONSULTS
[facility]  Physical Medicine & Rehab  Consult Note    Patient Name: Ryder Boo  MRN: [id number]  Admission Date: 5/6/2018  Hospital Length of Stay: 8 days  Attending Physician: Silver Mcfarland MD     Inpatient consult to Physical Medicine & Rehabilitation  Consult performed by: Lin Stahl NP  Consult requested by:  Silver Mcfarland MD    Collaborating Physician: Lila Molina MD  Reason for Consult:  Rehab evaluation     Consults  Subjective:     Principal Problem: Aspiration pneumonia    HPI: Ryder Boo is a 63-year-old male with PMHx of COPD and EtOH abuse.  Patient presented to [facility] after being found unresponsive.  On arrival, found to be hypoxic and hypotensive with undetectable EtOH levels, leukocytosis, ROHITH, and hyponatremia.  Placed on non-rebreather and given one dose of Narcan.  CXR showed bilateral multifocal infiltration.  He was then started on IV fluids and antibiotics for presumed pneumonia.  Following arrival, he developed hypoxic hypercapnic respiratory failure and acidosis requiring intubation and pressor support.  He was then transferred to [facility] on 5/6/18 for further evaluation and management.  Admitted to MICU for acute respiratory failure 2/2 suspected aspiration pneumonia.  Treated with Zosyn and Vancomycin.  Hospital course further complicated by acute encephalopathy/delirium, fluid overload requiring diuresis and high flow oxygen, and dysphagia.  Stepped down to floor on 5/9/18.    Functional History: Per patient (poor historian), he lives in [place] with his brother and nephew in a mobile home.  Prior to admission, he was (I) with ADLs and (I)-mod(I) with mobility.  Used cane PRN.  DME: cane.    Hospital Course:   5/11/18:  Participated with OT and SLP.  Remains NPO.   Bed mobility min-modA.  Sit to stand modA and transfers modA.  5/12/18:  Participated with OT.  Bed mobility Breonna.  Sit to stand Breonna and transfers Breonna.  BD Breonna.  5/14/18:   Participated with PT and SLP.  SLP recommendation: NPO diet and full liquids.  Bed mobility modA.  EOB SBA-CGA.  Sit to stand modA.  Standing balance modA.  No further transfers or gait.      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  Review of patient's allergies indicates:   Allergen Reactions    Pneumococcal 23-whitney ps vaccine      Pt confused, cannot recall reaction symptoms       Scheduled Medications:    albuterol-ipratropium 2.5mg-0.5mg/3mL  3 mL Nebulization Q6H WAKE    enoxaparin  40 mg Subcutaneous Daily    folic acid  1 mg Oral Daily    piperacillin-tazobactam (ZOSYN) IVPB  4.5 g Intravenous Q8H    thiamine  100 mg Oral Daily       PRN Medications: lorazepam, sodium chloride 0.9%    Family History     None        Social History Main Topics    Smoking status: Not on file    Smokeless tobacco: Not on file    Alcohol use Not on file    Drug use: Unknown    Sexual activity: Not on file     Review of Systems   Constitutional: Positive for activity change. Negative for chills, fatigue and fever.   HENT: Positive for trouble swallowing. Negative for drooling, hearing loss and voice change.    Eyes: Negative for pain and visual disturbance.   Respiratory: Negative for cough, shortness of breath and wheezing.    Cardiovascular: Negative for chest pain and palpitations.   Gastrointestinal: Negative for abdominal pain, nausea and vomiting.   Genitourinary: Negative for difficulty urinating and flank pain.   Musculoskeletal: Positive for arthralgias and gait problem. Negative for back pain, myalgias and neck pain.   Skin: Negative for rash and wound.   Neurological: Negative for dizziness, weakness, numbness and headaches.   Psychiatric/Behavioral:  Positive for confusion. Negative for agitation and hallucinations. The patient is not nervous/anxious.      Objective:     Vital Signs (Most Recent):  Temp: 98.4 °F (36.9 °C) (18 1139)  Pulse: 80 (18 1139)  Resp: 18 (18 1139)  BP: 137/83 (18 1139)  SpO2: 95 % (18 1139)    Vital Signs (24h Range):  Temp:  [98 °F (36.7 °C)-98.7 °F (37.1 °C)] 98.4 °F (36.9 °C)  Pulse:  [74-93] 80  Resp:  [16-20] 18  SpO2:  [86 %-100 %] 95 %  BP: (122-155)/(72-89) 137/83     Body mass index is 27.11 kg/m².    Physical Exam   Constitutional: He appears well-developed and well-nourished. No distress.   HENT:   Head: Normocephalic and atraumatic.   Right Ear: External ear normal.   Left Ear: External ear normal.   Nose: Nose normal.   Eyes: Right eye exhibits no discharge. Left eye exhibits no discharge. No scleral icterus.   Neck: Normal range of motion.   Cardiovascular: Normal rate, regular rhythm and intact distal pulses.    Pulmonary/Chest: Effort normal. No respiratory distress. He has no wheezes.   Abdominal: Soft. He exhibits no distension. There is no tenderness.   Musculoskeletal: Normal range of motion. He exhibits no edema or tenderness.   Neurological: He is alert. He is disoriented. He exhibits normal muscle tone.   -  Mental Status:  Awake and alert.  Oriented to self. Follows commands.  Answers correct age and .   -  Motor:  No focal weakness.  RUE: 4/5.  LUE: 4/5.  RLE: 4-/5.  LLE: 4-/5.  -  Tone:  Normal.   -  Sensory:  Intact to light touch and pin prick.   Skin: Skin is warm and dry. No rash noted.   Psychiatric: He has a normal mood and affect. His behavior is normal. His speech is slurred. Cognition and memory are impaired.   Vitals reviewed.    Diagnostic Results:   Labs: Reviewed  X-Ray: Reviewed  CT: Reviewed  CTA: Reviewed    Assessment/Plan:     * Aspiration pneumonia    -  Presented after being found unresponsive  -  Found to be hypoxic and hypotensive with undetectable EtOH  levels, leukocytosis, ROHITH, and hyponatremia  CXR showed bilateral multifocal infiltration--> started on IV fluids and antibiotics for presumed pneumonia  -  Treated with Zosyn and Vancomycin--> currently on Zosyn         Impaired mobility and ADLs    -  Per patient, (I)/mod(I) with ADLs and mobility PTA  -  Likely related to acute hospital course  Recommendations  -  Encourage mobility, OOB in chair, and early ambulation as appropriate  -  PT/OT evaluate and treat  -  Pain management  -  Monitor for and prevent skin breakdown and pressure ulcers  · Early mobility, repositioning/weight shifting every 20-30 minutes when sitting, turn patient every 2 hours, proper mattress/overlay and chair cushioning, pressure relief/heel protector boots  -  DVT prophylaxis:  MENDY, SCD        Moderate malnutrition    -  Dysphagia--> SLP following  - SLP recommendation: NPO diet and full liquids        Acute respiratory failure with hypoxia and hypercapnia    -  Intubated prior to transfer to [facility]  -  Now extubated, weaned off high flow oxygen         Patient with rehab goals.  Continue PT and OT.  SLP evaluation pending.  Will follow progress and discuss with rehab team for final rehab recommendation.    Thank you for your consult.     USZANNE Calix  Department of Physical Medicine & Rehab  [facility]

## 2018-05-14 NOTE — PROGRESS NOTES
[facility] Hospital Medicine  Progress Note    Patient Name: Ryder Boo  MRN: [id number]  Patient Class: IP- Inpatient   Admission Date: 5/6/2018  Length of Stay: 8 days  Attending Physician: Silver Mcfarland MD  Primary Care Provider: Swapnil Heart MD    [facility] Medicine Team: [facility] MED 4 Mariaelena Luna MD    Subjective:     Principal Problem:Aspiration pneumonia    HPI:  The patient is a 62 y/o M new to our system, with HTN, COPD and alcoholism, who is transfer from [facility] ICU to [facility] [place] [facility].     The patient lives with his brother across the street from his sister. He was found unresponsive in a chair with small amount of blood in his mouth by his nephew and taken to the hospital by EMS on 5/3. Patient drinks every day and has been having frequent falls recently. Per sister he has quit smoking 15 years ago.  In the ED at OSH he was found to be hypoxic and hypotensive, with undetectable EtOH levels, leukocytosis (24K) and hyponatremia (119) in the labs. he received narcan x1, IV fluids, started on metronidazole and ceftriaxone (also x1 of zosyn, azithromycin, vancomycin, ceftaroline) for aspiration pneumonia, and was put on 15L of Oxygen with non-rebreather, which improved his mental status and BP, however on 4/5 upon weaning off of oxygen he developed hypoxic hypercapnic respiratory failure and subsequent acidosis (pH 7.10), therefore he was intubated and transferred to the ICU. Patient arrived to [facility] on dopamine gtt which per notes was started to maintain BP in the setting of hyponatremia.     Hospital Course:  5/6: upon arrival, patient was taken off of dopamine gtt and received 1.5L IV fluids to maintain MAPs. Continued on vanc/zosyn/azithromycin. Hyponatremia and ROHITH continued to improved with IVF. He was extubated in early afternoon and maintained normal O2 saturations on 3-4L oxygen on NC.   5/7: early morning he was noticed to be rattling while  breathing by the nurse. Upon examination he had increased work of breathing with worsening of bilateral rhonchi. Repeat CXR revealed interval worsening of the patchy infiltrations. Received x1 furosemide 60 mg IV and transitioned to high flow oxygen for possible development of ARDS, he is currently maintaining normal saturations, will keep on intubation watch.  5/8: several episodes of desaturation down to 70% overnight and early morning, that responded well to increasing oxygen to 100%, currently on 35L/50%, mildly drowsy due to precedex. Responded well to diuretics, net -2.8L. Will discontinue precedex and will have speech evaluate his swallowing.  5/9: Uneventful night off precedex. Remains confused, oriented to person and place. Oxygen requirements greatly improved 15L/50% with plan to wean to low-flow NC today. Diuresed -2.9L yesterday and discontinued lasix. Discontinued azithromycin and continued on zosyn. Failed speech eval by SLP and remains NPO  5/11-5/12 patient had NG tube placed, removed it and re-ordered on gentle tube feeds. On high o2 support at night as patient cannot have bipap due to NG.  05/14/2018 Patient passed swallow eval to full liquids. He is now sating 100 % RA family updated awaiting rehab evaluation     Interval History:  Patient passed swallow eval to full liquids. He is now sating 100 % RA family updated awaiting rehab evaluation       Review of Systems   Constitutional: Negative for chills and fever.   Respiratory: Negative for cough and shortness of breath.    Cardiovascular: Negative for chest pain and palpitations.   Gastrointestinal: Negative for abdominal pain, nausea and vomiting.     Objective:     Vital Signs (Most Recent):  Temp: 98.4 °F (36.9 °C) (05/14/18 1139)  Pulse: 80 (05/14/18 1139)  Resp: 18 (05/14/18 1139)  BP: 137/83 (05/14/18 1139)  SpO2: 95 % (05/14/18 1139) Vital Signs (24h Range):  Temp:  [98 °F (36.7 °C)-98.7 °F (37.1 °C)] 98.4 °F (36.9 °C)  Pulse:  [74-93]  80  Resp:  [16-20] 18  SpO2:  [86 %-100 %] 95 %  BP: (122-155)/(72-89) 137/83     Weight: 76.2 kg (167 lb 15.9 oz)  Body mass index is 27.11 kg/m².    Intake/Output Summary (Last 24 hours) at 05/14/18 1258  Last data filed at 05/14/18 0604   Gross per 24 hour   Intake             2475 ml   Output              885 ml   Net             1590 ml      Physical Exam   Constitutional: He is oriented to person, place, and time. He appears well-developed. No distress.   HENT:   Head: Normocephalic and atraumatic.   Eyes: Conjunctivae are normal. Pupils are equal, round, and reactive to light.   Cardiovascular: Normal rate, regular rhythm, S1 normal, S2 normal and intact distal pulses.    Pulmonary/Chest: Effort normal.   Diffuse insp crackles and exp rhonchi   Abdominal: Soft. He exhibits no distension.   Musculoskeletal: Normal range of motion. He exhibits no edema.   Neurological: He is alert and oriented to person, place, and time.   Skin: Skin is warm and dry.   Nursing note and vitals reviewed.      Significant Labs:   CBC:   Recent Labs  Lab 05/13/18  0557 05/14/18  0330   WBC 8.71 6.36   HGB 9.6* 8.9*   HCT 30.4* 27.6*    329     CMP:   Recent Labs  Lab 05/13/18  0557 05/13/18  1914 05/14/18  0330    144 140   K 2.9* 2.6* 2.8*    101 102   CO2 32* 34* 29    122* 125*   BUN 22 17 15   CREATININE 1.0 0.9 0.9   CALCIUM 8.9 8.5* 8.3*   PROT 7.1  --  6.4   ALBUMIN 2.0*  --  1.9*   BILITOT 0.5  --  0.5   ALKPHOS 42*  --  39*   AST 24  --  18   ALT 9*  --  9*   ANIONGAP 13 9 9   EGFRNONAA >60.0 >60.0 >60.0     All pertinent labs within the past 24 hours have been reviewed.    Significant Imaging: I have reviewed and interpreted all pertinent imaging results/findings within the past 24 hours.    Assessment/Plan:      * Aspiration pneumonia    - At OSH had significant leukocytosis with WBCs to 38K.  - CXR with bilateral multifocal infiltration.  - Suspect aspiration secondary to AMS.  - Continuing  piperacillin-tazobactam 4.5g IV q8hr; will start moxifloxacin tomorrow         Hypernatremia    resolved          Moderate malnutrition    - now on liquid diet will continue to advance.           Hypomagnesemia    - repleting daily           Hypokalemia    - repleting daily           Anemia    - Hgb holding stable; some concern for Hgb drop as outpatient. FOBT performed and positive; no gross evidence of bleed.  - Will continue to monitor.        Encephalopathy, metabolic    -resolved        Acute respiratory failure with hypoxia and hypercapnia    - Former tobacco abuse with history of COPD; concern for volume overload in ICU setting as well and underwent some diuresis. Respiratory status improving now; on 05/10 did have significant respiratory demand prompting CTA; negative for thromboembolic process.  - Continuing albuterol-ipratropium 2.5-0.5mg inhaled q6h wake.  - continue zosyn for pna for now he has had 9 days will likely DC starting tomorrow he will get moxifloxacin for 5 days   - Weaning O2 as tolerated, on venti mask qhs for now for some pressure support          Sepsis    - Secondary to aspiration PNA; improved.        Alcoholism /alcohol abuse    - History of alcohol abuse; continuing folic acid, thiamine daily.        ROHITH (acute kidney injury)    - Unclear baseline; Cr. holding stable.          VTE Risk Mitigation         Ordered     enoxaparin injection 40 mg  Daily      05/06/18 1016     Place sequential compression device  Until discontinued      05/06/18 0113     IP VTE LOW RISK PATIENT  Once      05/06/18 0113              Mariaelena Luna MD  Department of Hospital Medicine   [facility]

## 2018-05-14 NOTE — HOSPITAL COURSE
5/11/18:  Participated with OT and SLP.  Remains NPO.   Bed mobility min-modA.  Sit to stand modA and transfers modA.  5/12/18:  Participated with OT.  Bed mobility Breonna.  Sit to stand Breonna and transfers Breonna.  LBD Breonna.  5/14/18:  Participated with PT and SLP.  SLP recommendation: NPO diet and full liquids.  Bed mobility modA.  EOB SBA-CGA.  Sit to stand modA.  Standing balance modA.  No further transfers or gait.    5/15/18:  Participated with PT and SLP.  SLP upgraded recommendation: dental soft diet and thin liquids.  Cognitive-linguistic evaluation pending.  Bed mobility modA.  EOB x 6 minutes SBA-CGA.  No further functional mobility 2/2 wanting to get on bedpan.

## 2018-05-14 NOTE — PLAN OF CARE
Problem: SLP Goal  Goal: SLP Goal  Goals to be met 5/15  1. Pt will participate in ongoing swallow eval   2. Pt will tolerate full liquids PO diet with no overt s/s of aspiration.   Outcome: Ongoing (interventions implemented as appropriate)  Pt participated in ongoing swallow eval. Pt's POC remains appropriate.

## 2018-05-14 NOTE — PROGRESS NOTES
Consult received on patient. Stage 3 pressure injury with small amount of slough noted to patient's sacral spine. (0.4cm x 0.4cm x 0.2cm)   Recommend applying triad barrier cream qshift and as needed.   Turn every 2hrs. Low airloss overlay ordered for patient. Nursing to continue care.           05/14/18 1246       Pressure Injury 05/10/18 0900 Sacral spine Stage 3   Date First Assessed/Time First Assessed: 05/10/18 0900   Location: Sacral spine  Staging: Stage 3   Staging 3   Drainage Amount None   Drainage Characteristics/Odor No odor   Appearance Moist;Pink;Adipose;Slough   Tissue loss description Full thickness   Red (%), Wound Tissue Color 95 %   Yellow (%), Wound Tissue Color 5 %   Periwound Area Redness   Wound Edges Open   Wound Length (cm) 0.4 cm   Wound Width (cm) 0.4 cm   Wound Depth (cm) 0.2 cm   Wound Volume (cm^3) 0.03 cm^3

## 2018-05-14 NOTE — ASSESSMENT & PLAN NOTE
-  Intubated prior to transfer to [facility]  -  Now extubated, weaned off high flow oxygen

## 2018-05-14 NOTE — PLAN OF CARE
Problem: Patient Care Overview  Goal: Plan of Care Review  Dx: Pneumonia   Hx: HTN, COPD and alcoholism, who is a transfer from [facility] ICU to [facility] [place] after being found unresponsive at home    4/5: Transferred to [facility] [place]. Dopamine gtt off.   4/6: Pt extubated  5/7: Echo 50-55%         RN  MAP>65    *Pt likes [employer] channel 3        Outcome: Ongoing (interventions implemented as appropriate)  Pt was evaluated by speech and diet advanced to full liquid diet, iv team came and placed 20g R FA, iv potassium discontinued since pt now has diet     Problem: Fall Risk (Adult)  Intervention: Safety Precautions  Remains free of fall, has telesitter

## 2018-05-14 NOTE — ASSESSMENT & PLAN NOTE
- At OSH had significant leukocytosis with WBCs to 38K.  - CXR with bilateral multifocal infiltration.  - Suspect aspiration secondary to AMS.  - Continuing piperacillin-tazobactam 4.5g IV q8hr; will start moxifloxacin tomorrow

## 2018-05-14 NOTE — ASSESSMENT & PLAN NOTE
- Former tobacco abuse with history of COPD; concern for volume overload in ICU setting as well and underwent some diuresis. Respiratory status improving now; on 05/10 did have significant respiratory demand prompting CTA; negative for thromboembolic process.  - Continuing albuterol-ipratropium 2.5-0.5mg inhaled q6h wake.  - continue zosyn for pna for now he has had 9 days will likely DC starting tomorrow he will get moxifloxacin for 5 days   - Weaning O2 as tolerated, on venti mask qhs for now for some pressure support

## 2018-05-14 NOTE — PT/OT/SLP PROGRESS
"Physical Therapy Treatment    Patient Name:  Ryder Boo   MRN:  [id number]    Recommendations:     Discharge Recommendations:  rehabilitation facility   Discharge Equipment Recommendations: none   Barriers to discharge: None    Assessment:     Ryder Boo is a 63 y.o. male admitted with a medical diagnosis of Aspiration pneumonia.  He presents with the following impairments/functional limitations:  weakness, impaired endurance, impaired functional mobilty, impaired balance, decreased safety awareness, decreased lower extremity function, decreased upper extremity function, gait instability, impaired cognition, impaired cardiopulmonary response to activity. During today's session, pt motivated to improve quality of movement to return to (I) state. Disoriented to location and expressed fear of falling limiting progression of standing in today's session. Seated balance improved from prior session. Pt appropriate for discharge to IP rehab to address current deficits.     Rehab Prognosis:  Good; patient would benefit from acute skilled PT services to address these deficits and reach maximum level of function.      Recent Surgery: * No surgery found *      Plan:     During this hospitalization, patient to be seen 4 x/week to address the above listed problems via gait training, therapeutic activities, therapeutic exercises, neuromuscular re-education  · Plan of Care Expires:  06/06/18   Plan of Care Reviewed with: patient    Subjective     Communicated with nsg prior to session.  Patient found supine in bed upon PT entry to room, agreeable to treatment.      Chief Complaint: fatigue  Patient comments/goals: " I am in an hotel " per pt when questioned where he was. He is able to state [place], [place] but required reorientation to being in [facility].    Pain/Comfort:  · Pain Rating 1: 0/10    Patients cultural, spiritual, Worship conflicts given the current situation: none    Objective:     Patient found with: " peripheral IV     General Precautions: Standard, fall   Orthopedic Precautions:N/A   Braces: N/A     Functional Mobility  Bed Mobility  Scooting: moderate assistance  Supine to Sit: moderate assistance; req incr assistance with (B) LE management  Sit to Supine: moderate assistance; req incr assistance with (B) LE advancement   Transfers Sit to Stand:  moderate assistance with no AD for 5 trials  - Pt with incr (B) knee flexion and decr hip extension inhibiting ability to come to full upright position  Scooting along edge of bed: MIN A            Balance   Static Sitting stand by assistance   - Pt sat EOB for 15 minutes with and without (B) UE support.    Dynamic Sitting contact guard assistance   Static Standing moderate assistance   Dynamic Standing moderate assistance         AM-PAC 6 CLICK MOBILITY  Turning over in bed (including adjusting bedclothes, sheets and blankets)?: 2  Sitting down on and standing up from a chair with arms (e.g., wheelchair, bedside commode, etc.): 2  Moving from lying on back to sitting on the side of the bed?: 2  Moving to and from a bed to a chair (including a wheelchair)?: 2  Need to walk in hospital room?: 1  Climbing 3-5 steps with a railing?: 1  Total Score: 10       Therapeutic Activities and Exercises:   THERAPEUTIC EXERCISE  Pt performed therapeutic exercise seated for 20 reps (B) LE   - strengthening: LAQ, hip flexion, AP      EDUCATION  Pt educated pt on incr OOB activity including sitting in bedside chair majority of day, utilizing bedside commode for toileting need  Educated pt on being appropriate to transfer with nsg and PCT with 1 person assistance via squat pivot  Updated white board with appropriate PT mobility information for medical team notification  Pt verbalized agreement.       Patient left HOB elevated with all lines intact and call button in reach..    GOALS:    Physical Therapy Goals        Problem: Physical Therapy Goal    Goal Priority Disciplines Outcome  Goal Variances Interventions   Physical Therapy Goal     PT/OT, PT Ongoing (interventions implemented as appropriate)     Description:  Goals to be met by: 2018    Patient will increase functional independence with mobility by performin. Supine to sit with Moderate Assistance - MET  Revised: Supine to sit with CGA  2. Sit to supine with Moderate Assistance -  Met    Revised: Sit to supine with CGA  3. Sit to stand transfer with Moderate Assistance using LRAD or no AD - not met  4. Bed to chair transfer with Moderate Assistance using LRAD or no AD - not met  5. Gait  x 20 feet with Moderate Assistance using LRAD or no AD - not met  6. Lower extremity exercise program x20 reps per handout, with supervision - not met                        Time Tracking:     PT Received On: 18  PT Start Time: 1000     PT Stop Time: 1023  PT Total Time (min): 23 min     Billable Minutes: Therapeutic Activity 15 and Therapeutic Exercise 8    Treatment Type: Treatment  PT/PTA: PT     PTA Visit Number: 0     Ana Stiles PT, DPT  2018

## 2018-05-14 NOTE — PLAN OF CARE
CM left msg for pt's sister, Sejal to f/u on local SNF/Rehab list that was emailed as well updating her that a consult was placed for [facility]. CM informed Sejal via msg that [facility] accepts Medicaid; awaiting callback.    MARK received callback from sisterSejal - she will get the email from her dgtr this afternoon, review w/ her brother and call CM back. Sejal also stated that she felt that [facility] was a good choice as well b/c she has been satisfied w/ pt's care.    CM received callback from sisterSejal - family feels that pt would receive better care at [facility] and did not want to provide any local choices. CM [email or web address]

## 2018-05-14 NOTE — ASSESSMENT & PLAN NOTE
-  Per patient, (I)/mod(I) with ADLs and mobility PTA  -  Likely related to acute hospital course  Recommendations  -  Encourage mobility, OOB in chair, and early ambulation as appropriate  -  PT/OT evaluate and treat  -  Pain management  -  Monitor for and prevent skin breakdown and pressure ulcers  · Early mobility, repositioning/weight shifting every 20-30 minutes when sitting, turn patient every 2 hours, proper mattress/overlay and chair cushioning, pressure relief/heel protector boots  -  DVT prophylaxis:  MENDY, SCD

## 2018-05-14 NOTE — PT/OT/SLP PROGRESS
"Speech Language Pathology Treatment    Patient Name:  Ryder Boo   MRN:  [id number]  Admitting Diagnosis: Aspiration pneumonia    Recommendations:                 General Recommendations:  Dysphagia therapy  Diet recommendations:  NPO, Liquid Diet Level: Full liquids   Aspiration Precautions: Discontinue PO intake if pt is coughing and choking, 1 bite/sip at a time, Alternating bites/sips, Avoid talking while eating, Eliminate distractions, Feed only when awake/alert, Frequent oral care, HOB to 90 degrees, Monitor for s/s of aspiration, No straws, Remain upright 30 minutes post meal, Small bites/sips and Strict aspiration precautions   General Precautions: Standard, fall, aspiration  Communication strategies:  none    Subjective     Prior to SLP entry pt spoke with MD team and JAIME Butts, regarding pt's POC. MD team requested update on pt's POC after ST session complete to determine if pt would be safe to start a PO diet.   Pt awake, alert and oriented. Pt sitting upright in bed upon SLP entry. JAIME Butts present in room during session.    Patient goals: "I don't want to mess up" per pt     Pain/Comfort:  · Pain Rating 1: 0/10  · Pain Rating Post-Intervention 1: 0/10    Objective:     Has the patient been evaluated by SLP for swallowing?   Yes  Keep patient NPO? No   Current Respiratory Status: room air      Pt participated in ongoing swallow eval. Pt tolerated cup sips of thin liquids x8 and puree textures x5 with no overt s/s of aspiration. However, pt did demonstrate 2 episodes of delayed coughing s/p swallow thin liquids. Pt also required 2 swallows per bite during puree textures. However, pt demonstrated clear vocal quality s/p swallow across all trials of thin liquids and puree textures, with the exception of one trial of thin liquids.     SLP educated pt and pt's nurse on upgraded diet recs/swallow precautions, specifically to monitor for s/s of aspiration and to discontinue PO intake if coughing, choking, " turing red in the face or a wet, gurgly voice is observed, since pt is at risk for aspiration. Pt and JAIME Butts acknowledged and confirmed understanding, via teach back. Pt's whiteboard updated. SLP also called MD team and notified them of results/recs. MD team agreed with recs.    Assessment:     Ryder Boo is a 63 y.o. male with an SLP diagnosis of Dysphagia.  ST will continue to follow.    Goals:    SLP Goals        Problem: SLP Goal    Goal Priority Disciplines Outcome   SLP Goal     SLP Ongoing (interventions implemented as appropriate)   Description:  Goals to be met 5/15  1. Pt will participate in ongoing swallow eval   2. Pt will tolerate full liquids PO diet with no overt s/s of aspiration.                     Plan:     · Patient to be seen:  5 x/week   · Plan of Care expires:     · Plan of Care reviewed with:  patient (MD team and JAIME Butts)   · SLP Follow-Up:  Yes       Discharge recommendations:  rehabilitation facility       Time Tracking:     SLP Treatment Date:   05/14/18  Speech Start Time:  1027  Speech Stop Time:  1041     Speech Total Time (min):  14 min    Billable Minutes: Treatment Swallowing Dysfunction 14    PAYAL Tam, CF-SLP  Speech-Language Pathologist   5/14/2018

## 2018-05-14 NOTE — PLAN OF CARE
Problem: Patient Care Overview  Goal: Plan of Care Review  Dx: Pneumonia   Hx: HTN, COPD and alcoholism, who is a transfer from [facility] ICU to [facility] [place] mechanical ventilation after being found unresponsive at home    4/5: Transferred to [facility] [place] mechanical ventilation. Dopamine gtt off.   4/6: Pt extubated  5/7: Echo 50-55%         RN  MAP>65    *Pt likes [employer] channel 3        Outcome: Ongoing (interventions implemented as appropriate)  Pt remains free from falls and injuries. Pt weaned from 40 L comfort flow to room air overnight. Condom cath replaced X2, good u/o. K replaced but still 2.8 on AM labs; MD aware. NPO status maintained. Pt repositioned frequently. Plan of care discussed with pt and family. No c/o SOB, chest pain, or discomfort. VSS, no acute issues overnight.

## 2018-05-14 NOTE — SUBJECTIVE & OBJECTIVE
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  Review of patient's allergies indicates:   Allergen Reactions    Pneumococcal 23-whitney ps vaccine      Pt confused, cannot recall reaction symptoms       Scheduled Medications:    albuterol-ipratropium 2.5mg-0.5mg/3mL  3 mL Nebulization Q6H WAKE    enoxaparin  40 mg Subcutaneous Daily    folic acid  1 mg Oral Daily    piperacillin-tazobactam (ZOSYN) IVPB  4.5 g Intravenous Q8H    thiamine  100 mg Oral Daily       PRN Medications: lorazepam, sodium chloride 0.9%    Family History     None        Social History Main Topics    Smoking status: Not on file    Smokeless tobacco: Not on file    Alcohol use Not on file    Drug use: Unknown    Sexual activity: Not on file     Review of Systems   Constitutional: Positive for activity change. Negative for chills, fatigue and fever.   HENT: Positive for trouble swallowing. Negative for drooling, hearing loss and voice change.    Eyes: Negative for pain and visual disturbance.   Respiratory: Negative for cough, shortness of breath and wheezing.    Cardiovascular: Negative for chest pain and palpitations.   Gastrointestinal: Negative for abdominal pain, nausea and vomiting.   Genitourinary: Negative for difficulty urinating and flank pain.   Musculoskeletal: Positive for arthralgias and gait problem. Negative for back pain, myalgias and neck pain.   Skin: Negative for rash and wound.   Neurological: Negative for dizziness, weakness, numbness and headaches.   Psychiatric/Behavioral: Positive for confusion. Negative for agitation and hallucinations. The patient is not nervous/anxious.      Objective:     Vital Signs (Most Recent):  Temp: 98.4 °F (36.9 °C) (05/14/18  1139)  Pulse: 80 (18 1139)  Resp: 18 (18 1139)  BP: 137/83 (18 1139)  SpO2: 95 % (18 1139)    Vital Signs (24h Range):  Temp:  [98 °F (36.7 °C)-98.7 °F (37.1 °C)] 98.4 °F (36.9 °C)  Pulse:  [74-93] 80  Resp:  [16-20] 18  SpO2:  [86 %-100 %] 95 %  BP: (122-155)/(72-89) 137/83     Body mass index is 27.11 kg/m².    Physical Exam   Constitutional: He appears well-developed and well-nourished. No distress.   HENT:   Head: Normocephalic and atraumatic.   Right Ear: External ear normal.   Left Ear: External ear normal.   Nose: Nose normal.   Eyes: Right eye exhibits no discharge. Left eye exhibits no discharge. No scleral icterus.   Neck: Normal range of motion.   Cardiovascular: Normal rate, regular rhythm and intact distal pulses.    Pulmonary/Chest: Effort normal. No respiratory distress. He has no wheezes.   Abdominal: Soft. He exhibits no distension. There is no tenderness.   Musculoskeletal: Normal range of motion. He exhibits no edema or tenderness.   Neurological: He is alert. He is disoriented. He exhibits normal muscle tone.   -  Mental Status:  Awake and alert.  Oriented to self. Follows commands.  Answers correct age and .   -  Motor:  No focal weakness.  RUE: 4/5.  LUE: 4/5.  RLE: 4-/5.  LLE: 4-/5.  -  Tone:  Normal.   -  Sensory:  Intact to light touch and pin prick.   Skin: Skin is warm and dry. No rash noted.   Psychiatric: He has a normal mood and affect. His behavior is normal. His speech is slurred. Cognition and memory are impaired.   Vitals reviewed.    NEUROLOGICAL EXAMINATION:     MENTAL STATUS   Speech: slurred       Diagnostic Results:   Labs: Reviewed  X-Ray: Reviewed  CT: Reviewed  CTA: Reviewed

## 2018-05-14 NOTE — PROGRESS NOTES
"  [facility]  Adult Nutrition  Consult Note    SUMMARY     Recommendations    1. Continue current full liquid diet (per SLP recommendations). Add Boost Plus ONS to aid in caloric intake.   2. If/when medically feasible, ADAT to regular (texture per SLP).   3. RD to monitor & follow-up.    Goals: Meet % EEN, EPN  Nutrition Goal Status: new  Communication of RD Recs: reviewed with RN    Reason for Assessment    Reason for Assessment: RD follow-up  Diagnosis:  ( Aspiration pneumonia )  Relevant Medical History: EtOH abuse, COPD    General Information Comments: At time of visit, pt was NPO. Since then, diet advanced to full liquid (per ST recommendations). TF discontinued. Disoriented at time of visit.  Nutrition Discharge Planning: Unable to determine    Nutrition/Diet History    Patient Reported Diet/Restrictions/Preferences: general  Food Preferences: none  Do you have any cultural, spiritual, Nondenominational conflicts, given your current situation?: none stated  Food Allergies: NKFA  Factors Affecting Nutritional Intake: other (see comments) (Diet just advanced)    Anthropometrics    Temp: 98.2 °F (36.8 °C)  Height Method: Estimated  Height: 5' 6" (167.6 cm)  Height (inches): 66 in  Weight Method: Bed Scale  Weight: 76.2 kg (167 lb 15.9 oz)  Weight (lb): 167.99 lb  Ideal Body Weight (IBW), Male: 142 lb  % Ideal Body Weight, Male (lb): 118.3 lb  BMI (Calculated): 27.2  BMI Grade: 25 - 29.9 - overweight  Weight Loss: unintentional  Usual Body Weight (UBW), k.1 kg  Weight Change Amount: 19 lb 9.9 oz  % Usual Body Weight: 89.73  % Weight Change From Usual Weight: -10.46 %    Lab/Procedures/Meds    Pertinent Labs Reviewed: reviewed  Pertinent Labs Comments: K 2.8, Gluc 125  Pertinent Medications Reviewed: reviewed  Pertinent Medications Comments: Folic acid, Thiamine    Physical Findings/Assessment    Overall Physical Appearance: nourished  Oral/Mouth Cavity: WDL  Skin: pressure ulcer(s) (Left " Buttocks)    Estimated/Assessed Needs    Weight Used For Calorie Calculations: 76.2 kg (167 lb 15.9 oz)     Energy Calorie Requirements (kcal): 1881 kcal/d  Energy Need Method: Rutherford-St Jeor (1.25 PAL)     Protein Requirements: 76-92 g/d (1-1.2 g/kg)  Weight Used For Protein Calculations: 76.2 kg (167 lb 15.9 oz)     Fluid Requirements (mL): 1 mL/kcal or per MD    Nutrition Prescription Ordered    Current Diet Order: Full liquid  Current Nutrition Support Formula Ordered: Discontinued     Nutrition Risk    Level of Risk/Frequency of Follow-up: high     Assessment and Plan    Moderate malnutrition     Malnutrition in the context of Acute Illness/Injury     Related to (etiology):  No nutrient intake for the last week     Signs and Symptoms (as evidenced by):  Energy Intake: 0 of estimated energy requirement for 1+ wk  Body Fat Depletion: mild depletion of orbital's, triceps and thoracic and lumbar region   Muscle Mass Depletion: mild depletion of temples, clavicle region, interosseous muscle and lower extremities   Weight Loss: 10.5% x 2 wks     Nutrition Diagnosis Status:  Continues     Monitor and Evaluation    Food and Nutrient Intake: energy intake, food and beverage intake  Food and Nutrient Adminstration: diet order  Physical Activity and Function: nutrition-related ADLs and IADLs  Anthropometric Measurements: weight, weight change  Biochemical Data, Medical Tests and Procedures: lipid profile, inflammatory profile, glucose/endocrine profile, gastrointestinal profile, electrolyte and renal panel  Nutrition-Focused Physical Findings: overall appearance     Nutrition Follow-Up    RD Follow-up?: Yes

## 2018-05-14 NOTE — ASSESSMENT & PLAN NOTE
-  Presented after being found unresponsive  -  Found to be hypoxic and hypotensive with undetectable EtOH levels, leukocytosis, ROHITH, and hyponatremia  CXR showed bilateral multifocal infiltration--> started on IV fluids and antibiotics for presumed pneumonia  -  Treated with Zosyn and Vancomycin--> currently on Zosyn

## 2018-05-15 LAB
ALBUMIN SERPL BCP-MCNC: 2 G/DL
ALP SERPL-CCNC: 36 U/L
ALT SERPL W/O P-5'-P-CCNC: 9 U/L
ANION GAP SERPL CALC-SCNC: 11 MMOL/L
AST SERPL-CCNC: 16 U/L
BASOPHILS # BLD AUTO: 0.04 K/UL
BASOPHILS NFR BLD: 0.4 %
BILIRUB SERPL-MCNC: 0.5 MG/DL
BUN SERPL-MCNC: 14 MG/DL
CALCIUM SERPL-MCNC: 8.6 MG/DL
CHLORIDE SERPL-SCNC: 106 MMOL/L
CO2 SERPL-SCNC: 25 MMOL/L
CREAT SERPL-MCNC: 0.8 MG/DL
DIFFERENTIAL METHOD: ABNORMAL
EOSINOPHIL # BLD AUTO: 0.2 K/UL
EOSINOPHIL NFR BLD: 1.6 %
ERYTHROCYTE [DISTWIDTH] IN BLOOD BY AUTOMATED COUNT: 13.7 %
EST. GFR  (AFRICAN AMERICAN): >60 ML/MIN/1.73 M^2
EST. GFR  (NON AFRICAN AMERICAN): >60 ML/MIN/1.73 M^2
GLUCOSE SERPL-MCNC: 92 MG/DL
HCT VFR BLD AUTO: 27.9 %
HGB BLD-MCNC: 8.8 G/DL
IMM GRANULOCYTES # BLD AUTO: 0.06 K/UL
IMM GRANULOCYTES NFR BLD AUTO: 0.7 %
LYMPHOCYTES # BLD AUTO: 0.6 K/UL
LYMPHOCYTES NFR BLD: 6.8 %
MAGNESIUM SERPL-MCNC: 1.5 MG/DL
MCH RBC QN AUTO: 29.9 PG
MCHC RBC AUTO-ENTMCNC: 31.5 G/DL
MCV RBC AUTO: 95 FL
MONOCYTES # BLD AUTO: 0.6 K/UL
MONOCYTES NFR BLD: 6 %
NEUTROPHILS # BLD AUTO: 7.7 K/UL
NEUTROPHILS NFR BLD: 84.5 %
NRBC BLD-RTO: 0 /100 WBC
PLATELET # BLD AUTO: 378 K/UL
PMV BLD AUTO: 9.8 FL
POTASSIUM SERPL-SCNC: 2.8 MMOL/L
POTASSIUM SERPL-SCNC: 3.9 MMOL/L
PROT SERPL-MCNC: 6.6 G/DL
RBC # BLD AUTO: 2.94 M/UL
SODIUM SERPL-SCNC: 142 MMOL/L
WBC # BLD AUTO: 9.14 K/UL

## 2018-05-15 PROCEDURE — 25000242 PHARM REV CODE 250 ALT 637 W/ HCPCS: Performed by: INTERNAL MEDICINE

## 2018-05-15 PROCEDURE — 83735 ASSAY OF MAGNESIUM: CPT

## 2018-05-15 PROCEDURE — 97530 THERAPEUTIC ACTIVITIES: CPT

## 2018-05-15 PROCEDURE — 20600001 HC STEP DOWN PRIVATE ROOM

## 2018-05-15 PROCEDURE — 80053 COMPREHEN METABOLIC PANEL: CPT

## 2018-05-15 PROCEDURE — 99232 SBSQ HOSP IP/OBS MODERATE 35: CPT | Mod: ,,, | Performed by: HOSPITALIST

## 2018-05-15 PROCEDURE — 84132 ASSAY OF SERUM POTASSIUM: CPT

## 2018-05-15 PROCEDURE — 36415 COLL VENOUS BLD VENIPUNCTURE: CPT

## 2018-05-15 PROCEDURE — 25000003 PHARM REV CODE 250: Performed by: STUDENT IN AN ORGANIZED HEALTH CARE EDUCATION/TRAINING PROGRAM

## 2018-05-15 PROCEDURE — 63600175 PHARM REV CODE 636 W HCPCS: Performed by: STUDENT IN AN ORGANIZED HEALTH CARE EDUCATION/TRAINING PROGRAM

## 2018-05-15 PROCEDURE — 94761 N-INVAS EAR/PLS OXIMETRY MLT: CPT

## 2018-05-15 PROCEDURE — 94668 MNPJ CHEST WALL SBSQ: CPT

## 2018-05-15 PROCEDURE — 92526 ORAL FUNCTION THERAPY: CPT

## 2018-05-15 PROCEDURE — 94640 AIRWAY INHALATION TREATMENT: CPT

## 2018-05-15 PROCEDURE — 25000003 PHARM REV CODE 250: Performed by: INTERNAL MEDICINE

## 2018-05-15 PROCEDURE — 85025 COMPLETE CBC W/AUTO DIFF WBC: CPT

## 2018-05-15 PROCEDURE — 99232 SBSQ HOSP IP/OBS MODERATE 35: CPT | Mod: ,,, | Performed by: NURSE PRACTITIONER

## 2018-05-15 RX ORDER — MOXIFLOXACIN HYDROCHLORIDE 400 MG/1
400 TABLET ORAL DAILY
Status: COMPLETED | OUTPATIENT
Start: 2018-05-15 | End: 2018-05-19

## 2018-05-15 RX ORDER — POTASSIUM CHLORIDE 20 MEQ/15ML
40 SOLUTION ORAL
Status: COMPLETED | OUTPATIENT
Start: 2018-05-15 | End: 2018-05-15

## 2018-05-15 RX ORDER — LANOLIN ALCOHOL/MO/W.PET/CERES
400 CREAM (GRAM) TOPICAL ONCE
Status: COMPLETED | OUTPATIENT
Start: 2018-05-15 | End: 2018-05-15

## 2018-05-15 RX ADMIN — IPRATROPIUM BROMIDE AND ALBUTEROL SULFATE 3 ML: .5; 3 SOLUTION RESPIRATORY (INHALATION) at 09:05

## 2018-05-15 RX ADMIN — THIAMINE HCL TAB 100 MG 100 MG: 100 TAB at 09:05

## 2018-05-15 RX ADMIN — Medication 400 MG: at 10:05

## 2018-05-15 RX ADMIN — MOXIFLOXACIN HYDROCHLORIDE 400 MG: 400 TABLET, FILM COATED ORAL at 09:05

## 2018-05-15 RX ADMIN — IPRATROPIUM BROMIDE AND ALBUTEROL SULFATE 3 ML: .5; 3 SOLUTION RESPIRATORY (INHALATION) at 02:05

## 2018-05-15 RX ADMIN — POTASSIUM CHLORIDE 40 MEQ: 20 SOLUTION ORAL at 09:05

## 2018-05-15 RX ADMIN — ENOXAPARIN SODIUM 40 MG: 100 INJECTION SUBCUTANEOUS at 05:05

## 2018-05-15 RX ADMIN — IPRATROPIUM BROMIDE AND ALBUTEROL SULFATE 3 ML: .5; 3 SOLUTION RESPIRATORY (INHALATION) at 07:05

## 2018-05-15 RX ADMIN — FOLIC ACID 1 MG: 1 TABLET ORAL at 09:05

## 2018-05-15 RX ADMIN — POTASSIUM CHLORIDE 40 MEQ: 20 SOLUTION ORAL at 08:05

## 2018-05-15 RX ADMIN — POTASSIUM CHLORIDE 40 MEQ: 20 SOLUTION ORAL at 12:05

## 2018-05-15 RX ADMIN — PIPERACILLIN AND TAZOBACTAM 4.5 G: 4; .5 INJECTION, POWDER, LYOPHILIZED, FOR SOLUTION INTRAVENOUS; PARENTERAL at 05:05

## 2018-05-15 NOTE — ASSESSMENT & PLAN NOTE
- Former tobacco abuse with history of COPD; concern for volume overload in ICU setting as well and underwent some diuresis. Respiratory status improving now; on 05/10 did have significant respiratory demand prompting CTA; negative for thromboembolic process.  - Continuing albuterol-ipratropium 2.5-0.5mg inhaled q6h wake.  - now on NC  - will start moxifloxacin for 5 more days for total treamtent of 15 days

## 2018-05-15 NOTE — NURSING
No acute changes over shift. VSS through out shift. Pt receiving IV abxs as ordered. Wound care completed on pt. Pt was advanced to adult regular diet dysph and tolerating well. Pt received K+ and Mg supplements as ordered.Pt receiving breathing txs as ordered. Pt is encouraged to cough and deep breathe. No other needs or wants at this time. Bedside table and call light is within reach. WCTM. Bed exit on.

## 2018-05-15 NOTE — PLAN OF CARE
Problem: Physical Therapy Goal  Goal: Physical Therapy Goal  Goals to be met by: 2018    Patient will increase functional independence with mobility by performin. Supine to sit with Moderate Assistance - MET  Revised: Supine to sit with CGA  2. Sit to supine with Moderate Assistance -  Met    Revised: Sit to supine with CGA  3. Sit to stand transfer with Moderate Assistance using LRAD or no AD - not met  4. Bed to chair transfer with Moderate Assistance using LRAD or no AD - not met  5. Gait  x 20 feet with Moderate Assistance using LRAD or no AD - not met  6. Lower extremity exercise program x20 reps per handout, with supervision - not met       Goals remain appropriate at time. Continue with PT POC as indicated.

## 2018-05-15 NOTE — ASSESSMENT & PLAN NOTE
- At OSH had significant leukocytosis with WBCs to 38K.  - CXR with bilateral multifocal infiltration.  - Suspect aspiration secondary to AMS.  - on moxifloxacin

## 2018-05-15 NOTE — SUBJECTIVE & OBJECTIVE
Interval History:Patient remains sating well on room air. He is tolerating fluids. No acute events overnight     Review of Systems   Constitutional: Negative for chills and fever.   Respiratory: Negative for cough and shortness of breath.    Cardiovascular: Negative for chest pain and palpitations.   Gastrointestinal: Negative for abdominal pain, nausea and vomiting.     Objective:     Vital Signs (Most Recent):  Temp: 98.8 °F (37.1 °C) (05/15/18 0748)  Pulse: 74 (05/15/18 0748)  Resp: 20 (05/15/18 0748)  BP: (!) 145/90 (05/15/18 0748)  SpO2: (!) 91 % (05/15/18 0748) Vital Signs (24h Range):  Temp:  [98.2 °F (36.8 °C)-98.8 °F (37.1 °C)] 98.8 °F (37.1 °C)  Pulse:  [] 74  Resp:  [16-20] 20  SpO2:  [77 %-96 %] 91 %  BP: (128-167)/(61-90) 145/90     Weight: 76.2 kg (167 lb 15.9 oz)  Body mass index is 27.11 kg/m².    Intake/Output Summary (Last 24 hours) at 05/15/18 0923  Last data filed at 05/15/18 0600   Gross per 24 hour   Intake              400 ml   Output             1150 ml   Net             -750 ml      Physical Exam   Constitutional: He is oriented to person, place, and time. He appears well-developed. No distress.   HENT:   Head: Normocephalic and atraumatic.   Eyes: Conjunctivae are normal. Pupils are equal, round, and reactive to light.   Cardiovascular: Normal rate, regular rhythm, S1 normal, S2 normal and intact distal pulses.    Pulmonary/Chest: Effort normal.   Diffuse insp crackles and exp rhonchi   Abdominal: Soft. He exhibits no distension.   Musculoskeletal: Normal range of motion. He exhibits no edema.   Neurological: He is alert and oriented to person, place, and time.   Skin: Skin is warm and dry.   Nursing note and vitals reviewed.      Significant Labs:   CBC:   Recent Labs  Lab 05/14/18  0330 05/15/18  0423   WBC 6.36 9.14   HGB 8.9* 8.8*   HCT 27.6* 27.9*    378*     CMP:   Recent Labs  Lab 05/13/18  1914 05/14/18  0330 05/15/18  0423    140 142   K 2.6* 2.8* 2.8*     102 106   CO2 34* 29 25   * 125* 92   BUN 17 15 14   CREATININE 0.9 0.9 0.8   CALCIUM 8.5* 8.3* 8.6*   PROT  --  6.4 6.6   ALBUMIN  --  1.9* 2.0*   BILITOT  --  0.5 0.5   ALKPHOS  --  39* 36*   AST  --  18 16   ALT  --  9* 9*   ANIONGAP 9 9 11   EGFRNONAA >60.0 >60.0 >60.0     Respiratory Culture: No results for input(s): GSRESP, RESPIRATORYC in the last 48 hours.    Significant Imaging: I have reviewed and interpreted all pertinent imaging results/findings within the past 24 hours.

## 2018-05-15 NOTE — SUBJECTIVE & OBJECTIVE
Interval History 5/15/2018:  Patient is seen for follow-up rehab evaluation and recommendations: No acute events over night.  Camera sitter in room.  Tolerating full liquid diet.  Participating with therapy.  Barriers for discharge/rehab admission: not medically ready for discharge     HPI, Past Medical, Family, and Social History remains the same as documented in the initial encounter.    Scheduled Medications:    albuterol-ipratropium 2.5mg-0.5mg/3mL  3 mL Nebulization Q6H WAKE    enoxaparin  40 mg Subcutaneous Daily    folic acid  1 mg Oral Daily    moxifloxacin  400 mg Oral Daily    thiamine  100 mg Oral Daily     PRN Medications: lorazepam, sodium chloride 0.9%    Review of Systems   Constitutional: Positive for activity change. Negative for chills, fatigue and fever.   HENT: Positive for trouble swallowing. Negative for drooling, hearing loss and voice change.    Eyes: Negative for pain and visual disturbance.   Respiratory: Negative for cough, shortness of breath and wheezing.    Cardiovascular: Negative for chest pain and palpitations.   Gastrointestinal: Negative for abdominal pain, nausea and vomiting.   Genitourinary: Negative for difficulty urinating and flank pain.   Musculoskeletal: Positive for arthralgias and gait problem. Negative for back pain, myalgias and neck pain.   Skin: Negative for rash and wound.   Neurological: Negative for dizziness, weakness, numbness and headaches.   Psychiatric/Behavioral: Positive for confusion. Negative for agitation and hallucinations. The patient is not nervous/anxious.      Objective:     Vital Signs (Most Recent):  Temp: 98.1 °F (36.7 °C) (05/15/18 1155)  Pulse: 94 (05/15/18 1155)  Resp: 18 (05/15/18 1155)  BP: 126/73 (05/15/18 1155)  SpO2: (!) 91 % (05/15/18 1155)    Vital Signs (24h Range):  Temp:  [98.1 °F (36.7 °C)-98.8 °F (37.1 °C)] 98.1 °F (36.7 °C)  Pulse:  [] 94  Resp:  [16-20] 18  SpO2:  [77 %-96 %] 91 %  BP: (126-167)/(61-90) 126/73      Physical Exam   Constitutional: He appears well-developed and well-nourished. No distress.   HENT:   Head: Normocephalic and atraumatic.   Right Ear: External ear normal.   Left Ear: External ear normal.   Nose: Nose normal.   Eyes: Right eye exhibits no discharge. Left eye exhibits no discharge. No scleral icterus.   Neck: Normal range of motion.   Cardiovascular: Normal rate, regular rhythm and intact distal pulses.    Pulmonary/Chest: Effort normal. No respiratory distress. He has no wheezes.   Abdominal: Soft. He exhibits no distension. There is no tenderness.   Musculoskeletal: Normal range of motion. He exhibits no edema or tenderness.   Neurological: He is alert. He exhibits normal muscle tone.   -  Mental Status:  Awake and alert.  Oriented to self and place. Follows commands.  Answers correct age and .   -  Motor:  No focal weakness.  RUE: 4/5.  LUE: 4/5.  RLE: 4-/5.  LLE: 4-/5.  -  Tone:  Normal.   -  Sensory:  Intact to light touch and pin prick.   Skin: Skin is warm and dry. No rash noted.   Psychiatric: He has a normal mood and affect. His behavior is normal. His speech is slurred. Cognition and memory are impaired.   Vitals reviewed.    Diagnostic Results:   Labs: Reviewed  X-Ray: Reviewed  CT: Reviewed  CTA: Reviewed    NEUROLOGICAL EXAMINATION:     MENTAL STATUS   Speech: slurred

## 2018-05-15 NOTE — PT/OT/SLP PROGRESS
Physical Therapy Treatment    Patient Name:  Ryder Boo   MRN:  [id number]    Recommendations:     Discharge Recommendations:  rehabilitation facility   Discharge Equipment Recommendations: none   Barriers to discharge: None    Assessment:     Ryder Boo is a 63 y.o. male admitted with a medical diagnosis of Aspiration pneumonia.  He presents with the following impairments/functional limitations:  weakness, impaired endurance, impaired functional mobilty, impaired balance, decreased safety awareness, decreased lower extremity function, decreased upper extremity function, gait instability, impaired cognition, impaired cardiopulmonary response to activity. Patient continues to require moderate assistance with bed mobility. Patients treatment session was limited on this date 2* to BM. Pt will continue to benefit from PT services at this time. Continue with PT POC as indicated.     Rehab Prognosis:  Good; patient would benefit from acute skilled PT services to address these deficits and reach maximum level of function.      Recent Surgery: * No surgery found *      Plan:     During this hospitalization, patient to be seen 4 x/week to address the above listed problems via gait training, therapeutic activities, therapeutic exercises, neuromuscular re-education  · Plan of Care Expires:  06/06/18   Plan of Care Reviewed with: patient    Subjective     Communicated with nursing prior to session.  Patient found supine upon PT entry to room, agreeable to treatment.      Chief Complaint: none stated  Patient comments/goals: none stated  Pain/Comfort:  · Pain Rating 1: 0/10  · Pain Rating Post-Intervention 1: 0/10    Patients cultural, spiritual, Jewish conflicts given the current situation: none    Objective:     Patient found with:  (all lines intact)     General Precautions: Standard, aspiration, fall   Orthopedic Precautions:N/A   Braces: N/A     Functional Mobility:  · Bed Mobility:  Rolling Left:  moderate  assistance  · Rolling Right: moderate assistance  · Scooting: moderate assistance  · Supine to Sit: moderate assistance  · Sit to Supine: moderate assistance  **Transfers and gait not performed on this date 2* to pt with BM and wanting to be placed on bedpan.     AM-PAC 6 CLICK MOBILITY  Turning over in bed (including adjusting bedclothes, sheets and blankets)?: 2  Sitting down on and standing up from a chair with arms (e.g., wheelchair, bedside commode, etc.): 2  Moving from lying on back to sitting on the side of the bed?: 2  Moving to and from a bed to a chair (including a wheelchair)?: 2  Need to walk in hospital room?: 1  Climbing 3-5 steps with a railing?: 1  Total Score: 10       Therapeutic Activities and Exercises:   -Pt sat EOB ~6 min with SBA-CGA prior to pt requesting to be placed on bed pan.     Patient left supine with all lines intact, call button in reach, bed alarm on and nursing notified..    GOALS:    Physical Therapy Goals        Problem: Physical Therapy Goal    Goal Priority Disciplines Outcome Goal Variances Interventions   Physical Therapy Goal     PT/OT, PT Ongoing (interventions implemented as appropriate)     Description:  Goals to be met by: 2018    Patient will increase functional independence with mobility by performin. Supine to sit with Moderate Assistance - MET  Revised: Supine to sit with CGA  2. Sit to supine with Moderate Assistance -  Met    Revised: Sit to supine with CGA  3. Sit to stand transfer with Moderate Assistance using LRAD or no AD - not met  4. Bed to chair transfer with Moderate Assistance using LRAD or no AD - not met  5. Gait  x 20 feet with Moderate Assistance using LRAD or no AD - not met  6. Lower extremity exercise program x20 reps per handout, with supervision - not met                        Time Tracking:     PT Received On: 05/15/18  PT Start Time: 1310     PT Stop Time: 1322  PT Total Time (min): 12 min     Billable Minutes: Therapeutic  Activity 12    Treatment Type: Treatment  PT/PTA: PTA     PTA Visit Number: 1     Elysia Cabrales, SAROJ  05/15/2018

## 2018-05-15 NOTE — PROGRESS NOTES
[facility]  Physical Medicine & Rehab  Progress Note    Patient Name: Ryder Boo  MRN: [id number]  Admission Date: 5/6/2018  Length of Stay: 9 days  Attending Physician: Silver Mcfarland MD    Subjective:     Principal Problem:Aspiration pneumonia    Hospital Course:   5/11/18:  Participated with OT and SLP.  Remains NPO.   Bed mobility min-modA.  Sit to stand modA and transfers modA.  5/12/18:  Participated with OT.  Bed mobility Breonna.  Sit to stand Breonna and transfers Breonna.  BD Breonna.  5/14/18:  Participated with PT and SLP.  SLP recommendation: NPO diet and full liquids.  Bed mobility modA.  EOB SBA-CGA.  Sit to stand modA.  Standing balance modA.  No further transfers or gait.      Interval History 5/15/2018:  Patient is seen for follow-up rehab evaluation and recommendations: No acute events over night.  Camera sitter in room.  Tolerating full liquid diet.  Participating with therapy.  Barriers for discharge/rehab admission: not medically ready for discharge     HPI, Past Medical, Family, and Social History remains the same as documented in the initial encounter.    Scheduled Medications:    albuterol-ipratropium 2.5mg-0.5mg/3mL  3 mL Nebulization Q6H WAKE    enoxaparin  40 mg Subcutaneous Daily    folic acid  1 mg Oral Daily    moxifloxacin  400 mg Oral Daily    thiamine  100 mg Oral Daily     PRN Medications: lorazepam, sodium chloride 0.9%    Review of Systems   Constitutional: Positive for activity change. Negative for chills, fatigue and fever.   HENT: Positive for trouble swallowing. Negative for drooling, hearing loss and voice change.    Eyes: Negative for pain and visual disturbance.   Respiratory: Negative for cough, shortness of breath and wheezing.    Cardiovascular: Negative for chest pain and palpitations.   Gastrointestinal: Negative for abdominal pain, nausea and vomiting.   Genitourinary: Negative for difficulty urinating and flank pain.   Musculoskeletal: Positive for  arthralgias and gait problem. Negative for back pain, myalgias and neck pain.   Skin: Negative for rash and wound.   Neurological: Negative for dizziness, weakness, numbness and headaches.   Psychiatric/Behavioral: Positive for confusion. Negative for agitation and hallucinations. The patient is not nervous/anxious.      Objective:     Vital Signs (Most Recent):  Temp: 98.1 °F (36.7 °C) (05/15/18 1155)  Pulse: 94 (05/15/18 1155)  Resp: 18 (05/15/18 1155)  BP: 126/73 (05/15/18 1155)  SpO2: (!) 91 % (05/15/18 1155)    Vital Signs (24h Range):  Temp:  [98.1 °F (36.7 °C)-98.8 °F (37.1 °C)] 98.1 °F (36.7 °C)  Pulse:  [] 94  Resp:  [16-20] 18  SpO2:  [77 %-96 %] 91 %  BP: (126-167)/(61-90) 126/73     Physical Exam   Constitutional: He appears well-developed and well-nourished. No distress.   HENT:   Head: Normocephalic and atraumatic.   Right Ear: External ear normal.   Left Ear: External ear normal.   Nose: Nose normal.   Eyes: Right eye exhibits no discharge. Left eye exhibits no discharge. No scleral icterus.   Neck: Normal range of motion.   Cardiovascular: Normal rate, regular rhythm and intact distal pulses.    Pulmonary/Chest: Effort normal. No respiratory distress. He has no wheezes.   Abdominal: Soft. He exhibits no distension. There is no tenderness.   Musculoskeletal: Normal range of motion. He exhibits no edema or tenderness.   Neurological: He is alert. He exhibits normal muscle tone.   -  Mental Status:  Awake and alert.  Oriented to self and place. Follows commands.  Answers correct age and .   -  Motor:  No focal weakness.  RUE: 4/5.  LUE: 4/5.  RLE: 4-/5.  LLE: 4-/5.  -  Tone:  Normal.   -  Sensory:  Intact to light touch and pin prick.   Skin: Skin is warm and dry. No rash noted.   Psychiatric: He has a normal mood and affect. His behavior is normal. His speech is slurred. Cognition and memory are impaired.   Vitals reviewed.    Diagnostic Results:   Labs: Reviewed  X-Ray: Reviewed  CT:  Reviewed  CTA: Reviewed    Assessment/Plan:      * Aspiration pneumonia    -  Presented after being found unresponsive  -  Found to be hypoxic and hypotensive with undetectable EtOH levels, leukocytosis, ROHITH, and hyponatremia  CXR showed bilateral multifocal infiltration--> started on IV fluids and antibiotics for presumed pneumonia  -  Treated with Zosyn and Vancomycin--> currently on Zosyn         Impaired mobility and ADLs    -  Per patient, (I)/mod(I) with ADLs and mobility PTA  -  Likely related to acute hospital course  Recommendations  -  Encourage mobility, OOB in chair, and early ambulation as appropriate  -  PT/OT evaluate and treat  -  Pain management  -  Monitor for and prevent skin breakdown and pressure ulcers  · Early mobility, repositioning/weight shifting every 20-30 minutes when sitting, turn patient every 2 hours, proper mattress/overlay and chair cushioning, pressure relief/heel protector boots  -  DVT prophylaxis:  MENDY, SCD        Moderate malnutrition    -  Dysphagia--> SLP following  - SLP recommendation: NPO diet and full liquids        Acute respiratory failure with hypoxia and hypercapnia    -  Intubated prior to transfer to [facility]  -  Now extubated, weaned off high flow oxygen         SLP following for dysphagia and recommending NPO diet and full liquids.  Consider MBSS.  Patient must be tolerating diet prior to inpatient rehab admission.  Continue PT, OT, and SLP.  Will follow progress for final rehab recommendation.    SUZANNE Calix  Department of Physical Medicine & Rehab   [facility]

## 2018-05-15 NOTE — PT/OT/SLP PROGRESS
Speech Language Pathology Treatment    Patient Name:  Ryder Boo   MRN:  [id number]  Admitting Diagnosis: Aspiration pneumonia    Recommendations:                 General Recommendations:  Dysphagia therapy and Cognitive-linguistic evaluation  Diet recommendations:  Dental Soft, Liquid Diet Level: Thin   Aspiration Precautions: 1 bite/sip at a time, Feed only when awake/alert, HOB to 90 degrees and Standard aspiration precautions   General Precautions: Standard, aspiration, fall  Communication strategies:  provide increased time to answer    Subjective     Awake/alert    Pain/Comfort:  · Pain Rating 1: 0/10  · Pain Rating Post-Intervention 1: 0/10    Objective:     Has the patient been evaluated by SLP for swallowing?   Yes  Keep patient NPO? No   Current Respiratory Status: room air      Pt upright in bed eating ice cream upon entry. Throat clear noted x1 of 5 trials with ice cream. Pt tolerated thin liquids via cup x6, cracker x2, and puree x2 with timely oral transit and adequate oral clearance. No overt s/s of airway compromise noted. Pt with impulsive rate and decreased attention throughout trials. Recommend dental soft diet/thin liquids at this time. Please monitor for s/s of aspiration.     Assessment:     Ryder Boo is a 63 y.o. male with an SLP diagnosis of Dysphagia.      Goals:    SLP Goals        Problem: SLP Goal    Goal Priority Disciplines Outcome   SLP Goal     SLP Ongoing (interventions implemented as appropriate)   Description:  Goals to be met 5/15  1. Pt will participate in ongoing swallow eval   2. Pt will tolerate full liquids PO diet with no overt s/s of aspiration.   MET  3. Pt will tolerate dental soft diet/thin liquids without overt s/s of aspiration.                     Plan:     · Patient to be seen:  5 x/week   · Plan of Care reviewed with:  patient   · SLP Follow-Up:  Yes       Discharge recommendations:  rehabilitation facility       Time Tracking:     SLP Treatment Date:    05/15/18  Speech Start Time:  1258  Speech Stop Time:  1306     Speech Total Time (min):  8 min    Billable Minutes: Treatment Swallowing Dysfunction 8    Marley Kimble CCC-SLP  05/15/2018

## 2018-05-15 NOTE — PLAN OF CARE
Spoke with Humza from [facility] who stated they submitted for insurance auth, however, medical director at [facility] would like his diet advanced from full liquid and for patient to be tolerating solids prior to admission, notified CM, will follow.

## 2018-05-15 NOTE — PROGRESS NOTES
[facility] Hospital Medicine  Progress Note    Patient Name: Ryder Boo  MRN: [id number]  Patient Class: IP- Inpatient   Admission Date: 5/6/2018  Length of Stay: 9 days  Attending Physician: Silver Mcfarland MD  Primary Care Provider: Swapnil Heart MD    [facility] Medicine Team: [facility] HOSP MED 4 Mariaelena Luna MD    Subjective:     Principal Problem:Aspiration pneumonia    HPI:  The patient is a 62 y/o M new to our system, with HTN, COPD and alcoholism, who is transfer from [facility] ICU to [facility] on mechanical ventilation.     The patient lives with his brother across the street from his sister. He was found unresponsive in a chair with small amount of blood in his mouth by his nephew and taken to the hospital by EMS on 5/3. Patient drinks every day and has been having frequent falls recently. Per sister he has quit smoking 15 years ago.  In the ED at OSH he was found to be hypoxic and hypotensive, with undetectable EtOH levels, leukocytosis (24K) and hyponatremia (119) in the labs. he received narcan x1, IV fluids, started on metronidazole and ceftriaxone (also x1 of zosyn, azithromycin, vancomycin, ceftaroline) for aspiration pneumonia, and was put on 15L of Oxygen with non-rebreather, which improved his mental status and BP, however on 4/5 upon weaning off of oxygen he developed hypoxic hypercapnic respiratory failure and subsequent acidosis (pH 7.10), therefore he was intubated and transferred to the ICU. Patient arrived to [facility] on dopamine gtt which per notes was started to maintain BP in the setting of hyponatremia.     Hospital Course:  5/6: upon arrival, patient was taken off of dopamine gtt and received 1.5L IV fluids to maintain MAPs. Continued on vanc/zosyn/azithromycin. Hyponatremia and ROHITH continued to improved with IVF. He was extubated in early afternoon and maintained normal O2 saturations on 3-4L oxygen on NC.   5/7: early morning he was noticed to be rattling while  breathing by the nurse. Upon examination he had increased work of breathing with worsening of bilateral rhonchi. Repeat CXR revealed interval worsening of the patchy infiltrations. Received x1 furosemide 60 mg IV and transitioned to high flow oxygen for possible development of ARDS, he is currently maintaining normal saturations, will keep on intubation watch.  5/8: several episodes of desaturation down to 70% overnight and early morning, that responded well to increasing oxygen to 100%, currently on 35L/50%, mildly drowsy due to precedex. Responded well to diuretics, net -2.8L. Will discontinue precedex and will have speech evaluate his swallowing.  5/9: Uneventful night off precedex. Remains confused, oriented to person and place. Oxygen requirements greatly improved 15L/50% with plan to wean to low-flow NC today. Diuresed -2.9L yesterday and discontinued lasix. Discontinued azithromycin and continued on zosyn. Failed speech eval by SLP and remains NPO  5/11-5/12 patient had NG tube placed, removed it and re-ordered on gentle tube feeds. On high o2 support at night as patient cannot have bipap due to NG.  05/14/2018 Patient passed swallow eval to full liquids. He is now sating 100 % RA family updated awaiting rehab evaluation     Interval History:Patient remains sating well on room air. He is tolerating fluids. No acute events overnight     Review of Systems   Constitutional: Negative for chills and fever.   Respiratory: Negative for cough and shortness of breath.    Cardiovascular: Negative for chest pain and palpitations.   Gastrointestinal: Negative for abdominal pain, nausea and vomiting.     Objective:     Vital Signs (Most Recent):  Temp: 98.8 °F (37.1 °C) (05/15/18 0748)  Pulse: 74 (05/15/18 0748)  Resp: 20 (05/15/18 0748)  BP: (!) 145/90 (05/15/18 0748)  SpO2: (!) 91 % (05/15/18 0748) Vital Signs (24h Range):  Temp:  [98.2 °F (36.8 °C)-98.8 °F (37.1 °C)] 98.8 °F (37.1 °C)  Pulse:  [] 74  Resp:   [16-20] 20  SpO2:  [77 %-96 %] 91 %  BP: (128-167)/(61-90) 145/90     Weight: 76.2 kg (167 lb 15.9 oz)  Body mass index is 27.11 kg/m².    Intake/Output Summary (Last 24 hours) at 05/15/18 0923  Last data filed at 05/15/18 0600   Gross per 24 hour   Intake              400 ml   Output             1150 ml   Net             -750 ml      Physical Exam   Constitutional: He is oriented to person, place, and time. He appears well-developed. No distress.   HENT:   Head: Normocephalic and atraumatic.   Eyes: Conjunctivae are normal. Pupils are equal, round, and reactive to light.   Cardiovascular: Normal rate, regular rhythm, S1 normal, S2 normal and intact distal pulses.    Pulmonary/Chest: Effort normal.   Diffuse insp crackles and exp rhonchi   Abdominal: Soft. He exhibits no distension.   Musculoskeletal: Normal range of motion. He exhibits no edema.   Neurological: He is alert and oriented to person, place, and time.   Skin: Skin is warm and dry.   Nursing note and vitals reviewed.      Significant Labs:   CBC:   Recent Labs  Lab 05/14/18  0330 05/15/18  0423   WBC 6.36 9.14   HGB 8.9* 8.8*   HCT 27.6* 27.9*    378*     CMP:   Recent Labs  Lab 05/13/18  1914 05/14/18  0330 05/15/18  0423    140 142   K 2.6* 2.8* 2.8*    102 106   CO2 34* 29 25   * 125* 92   BUN 17 15 14   CREATININE 0.9 0.9 0.8   CALCIUM 8.5* 8.3* 8.6*   PROT  --  6.4 6.6   ALBUMIN  --  1.9* 2.0*   BILITOT  --  0.5 0.5   ALKPHOS  --  39* 36*   AST  --  18 16   ALT  --  9* 9*   ANIONGAP 9 9 11   EGFRNONAA >60.0 >60.0 >60.0     Respiratory Culture: No results for input(s): GSRESP, RESPIRATORYC in the last 48 hours.    Significant Imaging: I have reviewed and interpreted all pertinent imaging results/findings within the past 24 hours.    Assessment/Plan:      * Aspiration pneumonia    - At OSH had significant leukocytosis with WBCs to 38K.  - CXR with bilateral multifocal infiltration.  - Suspect aspiration secondary to AMS.  -  on moxifloxacin        Hypernatremia    resolved          Moderate malnutrition    - now on liquid diet will continue to advance.           Hypomagnesemia    - repleting daily           Hypokalemia    - repleting daily           Anemia    - Hgb holding stable; some concern for Hgb drop as outpatient. FOBT performed and positive; no gross evidence of bleed.  - Will continue to monitor.        Encephalopathy, metabolic    -resolved        Acute respiratory failure with hypoxia and hypercapnia    - Former tobacco abuse with history of COPD; concern for volume overload in ICU setting as well and underwent some diuresis. Respiratory status improving now; on 05/10 did have significant respiratory demand prompting CTA; negative for thromboembolic process.  - Continuing albuterol-ipratropium 2.5-0.5mg inhaled q6h wake.  - now on NC  - will start moxifloxacin for 5 more days for total treamtent of 15 days           Sepsis    - Secondary to aspiration PNA; improved.        Alcoholism /alcohol abuse    - History of alcohol abuse; continuing folic acid, thiamine daily.        ROHITH (acute kidney injury)    - Unclear baseline; Cr. holding stable.          VTE Risk Mitigation         Ordered     enoxaparin injection 40 mg  Daily      05/06/18 1016     Place sequential compression device  Until discontinued      05/06/18 0113     IP VTE LOW RISK PATIENT  Once      05/06/18 0113              Mariaelena Luna MD  Department of Hospital Medicine   [facility]

## 2018-05-16 LAB
ALBUMIN SERPL BCP-MCNC: 2.2 G/DL
ALP SERPL-CCNC: 36 U/L
ALT SERPL W/O P-5'-P-CCNC: 7 U/L
ANION GAP SERPL CALC-SCNC: 8 MMOL/L
AST SERPL-CCNC: 14 U/L
BASOPHILS # BLD AUTO: 0.04 K/UL
BASOPHILS NFR BLD: 0.5 %
BILIRUB SERPL-MCNC: 0.5 MG/DL
BUN SERPL-MCNC: 16 MG/DL
CALCIUM SERPL-MCNC: 8.7 MG/DL
CHLORIDE SERPL-SCNC: 105 MMOL/L
CO2 SERPL-SCNC: 27 MMOL/L
CREAT SERPL-MCNC: 0.9 MG/DL
DIFFERENTIAL METHOD: ABNORMAL
EOSINOPHIL # BLD AUTO: 0.1 K/UL
EOSINOPHIL NFR BLD: 1.7 %
ERYTHROCYTE [DISTWIDTH] IN BLOOD BY AUTOMATED COUNT: 13.8 %
EST. GFR  (AFRICAN AMERICAN): >60 ML/MIN/1.73 M^2
EST. GFR  (NON AFRICAN AMERICAN): >60 ML/MIN/1.73 M^2
GLUCOSE SERPL-MCNC: 90 MG/DL
HCT VFR BLD AUTO: 27.5 %
HGB BLD-MCNC: 9 G/DL
IMM GRANULOCYTES # BLD AUTO: 0.04 K/UL
IMM GRANULOCYTES NFR BLD AUTO: 0.5 %
LYMPHOCYTES # BLD AUTO: 0.8 K/UL
LYMPHOCYTES NFR BLD: 9.8 %
MAGNESIUM SERPL-MCNC: 1.4 MG/DL
MCH RBC QN AUTO: 30.8 PG
MCHC RBC AUTO-ENTMCNC: 32.7 G/DL
MCV RBC AUTO: 94 FL
MONOCYTES # BLD AUTO: 0.6 K/UL
MONOCYTES NFR BLD: 7.7 %
NEUTROPHILS # BLD AUTO: 6.5 K/UL
NEUTROPHILS NFR BLD: 79.8 %
NRBC BLD-RTO: 0 /100 WBC
PLATELET # BLD AUTO: 357 K/UL
PMV BLD AUTO: 10.2 FL
POTASSIUM SERPL-SCNC: 3.5 MMOL/L
PROT SERPL-MCNC: 6.7 G/DL
RBC # BLD AUTO: 2.92 M/UL
SODIUM SERPL-SCNC: 140 MMOL/L
WBC # BLD AUTO: 8.17 K/UL

## 2018-05-16 PROCEDURE — 25000003 PHARM REV CODE 250: Performed by: INTERNAL MEDICINE

## 2018-05-16 PROCEDURE — 99232 SBSQ HOSP IP/OBS MODERATE 35: CPT | Mod: ,,, | Performed by: HOSPITALIST

## 2018-05-16 PROCEDURE — 92523 SPEECH SOUND LANG COMPREHEN: CPT

## 2018-05-16 PROCEDURE — 97535 SELF CARE MNGMENT TRAINING: CPT

## 2018-05-16 PROCEDURE — 83735 ASSAY OF MAGNESIUM: CPT

## 2018-05-16 PROCEDURE — 25000242 PHARM REV CODE 250 ALT 637 W/ HCPCS: Performed by: INTERNAL MEDICINE

## 2018-05-16 PROCEDURE — 97530 THERAPEUTIC ACTIVITIES: CPT

## 2018-05-16 PROCEDURE — 94668 MNPJ CHEST WALL SBSQ: CPT

## 2018-05-16 PROCEDURE — 85025 COMPLETE CBC W/AUTO DIFF WBC: CPT

## 2018-05-16 PROCEDURE — 63600175 PHARM REV CODE 636 W HCPCS: Performed by: STUDENT IN AN ORGANIZED HEALTH CARE EDUCATION/TRAINING PROGRAM

## 2018-05-16 PROCEDURE — 36415 COLL VENOUS BLD VENIPUNCTURE: CPT

## 2018-05-16 PROCEDURE — 94761 N-INVAS EAR/PLS OXIMETRY MLT: CPT

## 2018-05-16 PROCEDURE — 20600001 HC STEP DOWN PRIVATE ROOM

## 2018-05-16 PROCEDURE — 25000003 PHARM REV CODE 250: Performed by: STUDENT IN AN ORGANIZED HEALTH CARE EDUCATION/TRAINING PROGRAM

## 2018-05-16 PROCEDURE — 27000221 HC OXYGEN, UP TO 24 HOURS

## 2018-05-16 PROCEDURE — 80053 COMPREHEN METABOLIC PANEL: CPT

## 2018-05-16 PROCEDURE — 94640 AIRWAY INHALATION TREATMENT: CPT

## 2018-05-16 RX ORDER — LANOLIN ALCOHOL/MO/W.PET/CERES
400 CREAM (GRAM) TOPICAL ONCE
Status: COMPLETED | OUTPATIENT
Start: 2018-05-16 | End: 2018-05-16

## 2018-05-16 RX ORDER — ACETAMINOPHEN 325 MG/1
650 TABLET ORAL EVERY 6 HOURS PRN
Status: DISCONTINUED | OUTPATIENT
Start: 2018-05-16 | End: 2018-06-01 | Stop reason: HOSPADM

## 2018-05-16 RX ADMIN — FOLIC ACID 1 MG: 1 TABLET ORAL at 09:05

## 2018-05-16 RX ADMIN — MAGNESIUM OXIDE TAB 400 MG (241.3 MG ELEMENTAL MG) 400 MG: 400 (241.3 MG) TAB at 09:05

## 2018-05-16 RX ADMIN — THIAMINE HCL TAB 100 MG 100 MG: 100 TAB at 09:05

## 2018-05-16 RX ADMIN — MOXIFLOXACIN HYDROCHLORIDE 400 MG: 400 TABLET, FILM COATED ORAL at 09:05

## 2018-05-16 RX ADMIN — IPRATROPIUM BROMIDE AND ALBUTEROL SULFATE 3 ML: .5; 3 SOLUTION RESPIRATORY (INHALATION) at 07:05

## 2018-05-16 RX ADMIN — IPRATROPIUM BROMIDE AND ALBUTEROL SULFATE 3 ML: .5; 3 SOLUTION RESPIRATORY (INHALATION) at 01:05

## 2018-05-16 RX ADMIN — ENOXAPARIN SODIUM 40 MG: 100 INJECTION SUBCUTANEOUS at 04:05

## 2018-05-16 RX ADMIN — IPRATROPIUM BROMIDE AND ALBUTEROL SULFATE 3 ML: .5; 3 SOLUTION RESPIRATORY (INHALATION) at 10:05

## 2018-05-16 NOTE — PLAN OF CARE
Problem: Occupational Therapy Goal  Goal: Occupational Therapy Goal  Goals to be met by: 5/22/18     Patient will increase functional independence with ADLs by performing:    Feeding with Supervision.  UE Dressing with Minimal Assistance. MET 5/16  LE Dressing with Moderate Assistance. MET 5/16  Grooming while standing with Contact Guard Assistance.  Toileting from bedside commode with Moderate Assistance for hygiene and clothing management.   Toilet transfer to bedside commode with Minimal Assistance .      Outcome: Ongoing (interventions implemented as appropriate)  Con't POC.    FRANCA Tavarez

## 2018-05-16 NOTE — ASSESSMENT & PLAN NOTE
-  Presented after being found unresponsive  -  Found to be hypoxic and hypotensive with undetectable EtOH levels, leukocytosis, ROHITH, and hyponatremia  CXR showed bilateral multifocal infiltration--> started on IV fluids and antibiotics for presumed pneumonia  -  Treated with Zosyn and Vancomycin--> now on Moxifloxacin

## 2018-05-16 NOTE — SUBJECTIVE & OBJECTIVE
Interval History: Patient with no acute events overnight, now on regular diet, has no complaints and is awaiting rehab placement   Review of Systems   Constitutional: Negative for activity change, chills, fatigue and fever.   HENT: Negative for drooling, hearing loss, trouble swallowing and voice change.    Eyes: Negative for pain and visual disturbance.   Respiratory: Negative for cough, shortness of breath and wheezing.    Cardiovascular: Negative for chest pain and palpitations.   Gastrointestinal: Negative for abdominal pain, nausea and vomiting.   Genitourinary: Negative for difficulty urinating and flank pain.   Musculoskeletal: Positive for back pain and gait problem. Negative for arthralgias, myalgias and neck pain.   Skin: Negative for rash and wound.   Neurological: Negative for dizziness, weakness, numbness and headaches.   Psychiatric/Behavioral: Positive for confusion. Negative for agitation and hallucinations. The patient is not nervous/anxious.      Objective:     Vital Signs (Most Recent):  Temp: 98.5 °F (36.9 °C) (05/16/18 0745)  Pulse: 83 (05/16/18 1002)  Resp: 18 (05/16/18 1002)  BP: 129/74 (05/16/18 0745)  SpO2: 98 % (05/16/18 1002) Vital Signs (24h Range):  Temp:  [97.4 °F (36.3 °C)-98.5 °F (36.9 °C)] 98.5 °F (36.9 °C)  Pulse:  [] 83  Resp:  [16-20] 18  SpO2:  [91 %-98 %] 98 %  BP: (126-161)/(73-88) 129/74     Weight: 76.2 kg (167 lb 15.9 oz)  Body mass index is 27.11 kg/m².    Intake/Output Summary (Last 24 hours) at 05/16/18 1101  Last data filed at 05/15/18 1700   Gross per 24 hour   Intake              480 ml   Output              700 ml   Net             -220 ml      Physical Exam   Constitutional: He appears well-developed and well-nourished. No distress.   HENT:   Head: Normocephalic and atraumatic.   Right Ear: External ear normal.   Left Ear: External ear normal.   Nose: Nose normal.   Eyes: Right eye exhibits no discharge. Left eye exhibits no discharge. No scleral icterus.    Neck: Normal range of motion.   Cardiovascular: Normal rate, regular rhythm and intact distal pulses.    Pulmonary/Chest: Effort normal. No respiratory distress. He has no wheezes.   Abdominal: Soft. He exhibits no distension. There is no tenderness.   Musculoskeletal: Normal range of motion. He exhibits no edema or tenderness.   Neurological: He is alert. He exhibits normal muscle tone.   Skin: Skin is warm and dry. No rash noted.   Psychiatric: He has a normal mood and affect. His behavior is normal. His speech is slurred. Cognition and memory are impaired.   Vitals reviewed.      Significant Labs:   CBC:   Recent Labs  Lab 05/15/18  0423 05/16/18  0535   WBC 9.14 8.17   HGB 8.8* 9.0*   HCT 27.9* 27.5*   * 357*     CMP:   Recent Labs  Lab 05/15/18  0423 05/15/18  1325 05/16/18  0535     --  140   K 2.8* 3.9 3.5     --  105   CO2 25  --  27   GLU 92  --  90   BUN 14  --  16   CREATININE 0.8  --  0.9   CALCIUM 8.6*  --  8.7   PROT 6.6  --  6.7   ALBUMIN 2.0*  --  2.2*   BILITOT 0.5  --  0.5   ALKPHOS 36*  --  36*   AST 16  --  14   ALT 9*  --  7*   ANIONGAP 11  --  8   EGFRNONAA >60.0  --  >60.0       Significant Imaging: I have reviewed and interpreted all pertinent imaging results/findings within the past 24 hours.

## 2018-05-16 NOTE — PLAN OF CARE
Problem: Patient Care Overview  Goal: Plan of Care Review  Dx: Pneumonia   Hx: HTN, COPD and alcoholism, who is a transfer [place] [facility] [place] [facility] [place] after being found unresponsive at home    4/5: [place] [facility] [place]. Dopamine gtt off.   4/6: Pt extubated  5/7: Echo 50-55%         RN  MAP>65    *Pt likes [employer] channel 3        Outcome: Ongoing (interventions implemented as appropriate)  Vss. Pt remains on 1L N/C @ 94%. Call light and personal items in reach. Pt D/C from tele and pulse ox. No new changes over night. Will continue to monitor pt.

## 2018-05-16 NOTE — PT/OT/SLP PROGRESS
Occupational Therapy   Treatment    Name: Ryder Boo  MRN: [id number]  Admitting Diagnosis:  Aspiration pneumonia       Recommendations:     Discharge Recommendations: rehabilitation facility  Discharge Equipment Recommendations:  walker, rolling, bedside commode, shower chair  Barriers to discharge:       Subjective     Communicated with: RN prior to session.  Pain/Comfort:  · Pain Rating 1: 0/10    Patients cultural, spiritual, Jewish conflicts given the current situation: none stated    Objective:     Patient found with: hess catheter, oxygen    General Precautions: Standard, fall   Orthopedic Precautions:N/A   Braces:       Occupational Performance:    Bed Mobility:    · Patient completed Rolling/Turning to Left with  contact guard assistance  · Patient completed Scooting/Bridging with stand by assistance  · Patient completed Supine to Sit with minimum assistance     Functional Mobility/Transfers:  · Patient completed Sit <> Stand Transfer with moderate assistance  with  rolling walker   · Patient completed Bed <> Chair Transfer using Step Transfer technique with minimum assistance with rolling walker  · Functional Mobility: Pt took a few steps from bed>chair with Min A. Deferred further mobility due to increased HR.    Activities of Daily Living:  · UB Dressing: stand by assistance   · LB Dressing: stand by assistance to don 1 sock.    Patient left up in chair with all lines intact and call button in reach    [facility] 6 Click:  [facility] Total Score: 21    Treatment & Education:  HR at rest = 119 / HR EOB = 125-130 Education:      Assessment:     Ryder Boo is a 63 y.o. male with a medical diagnosis of Aspiration pneumonia.  He is improving every session and is motivated to work and get stronger. OT deferred prolonged walking or further exercise this session 2/2 an increase in HR to 130s at EOB. Will continue to increase treatment intensity as appropriate to work towards functional (I).     Performance  deficits affecting function are weakness, gait instability, decreased lower extremity function, impaired balance, impaired endurance, decreased safety awareness, impaired self care skills, impaired functional mobilty, decreased coordination.      Rehab Prognosis:  Good; patient would benefit from acute skilled OT services to address these deficits and reach maximum level of function.       Plan:     Patient to be seen 4 x/week to address the above listed problems via self-care/home management, therapeutic activities, therapeutic exercises, neuromuscular re-education  · Plan of Care Expires: 06/07/18  · Plan of Care Reviewed with: patient    This Plan of care has been discussed with the patient who was involved in its development and understands and is in agreement with the identified goals and treatment plan    GOALS:    Occupational Therapy Goals        Problem: Occupational Therapy Goal    Goal Priority Disciplines Outcome Interventions   Occupational Therapy Goal     OT, PT/OT Ongoing (interventions implemented as appropriate)    Description:  Goals to be met by: 5/22/18     Patient will increase functional independence with ADLs by performing:    Feeding with Supervision.  UE Dressing with Minimal Assistance. MET 5/16  LE Dressing with Moderate Assistance. MET 5/16  Grooming while standing with Contact Guard Assistance.  Toileting from bedside commode with Moderate Assistance for hygiene and clothing management.   Toilet transfer to bedside commode with Minimal Assistance .                        Time Tracking:     OT Date of Treatment: 05/16/18  OT Start Time: 1015  OT Stop Time: 1045  OT Total Time (min): 30 min    Billable Minutes:Self Care/Home Management 10  Therapeutic Activity 20    FRANCA Tavarez  5/16/2018

## 2018-05-16 NOTE — ASSESSMENT & PLAN NOTE
- Former tobacco abuse with history of COPD; concern for volume overload in ICU setting as well and underwent some diuresis. Respiratory status improving now; on 05/10 did have significant respiratory demand prompting CTA; negative for thromboembolic process.  - Continuing albuterol-ipratropium 2.5-0.5mg inhaled q6h wake.  - now on NC  -continue  moxifloxacin  for total treamtent of 15 days : 5/20

## 2018-05-16 NOTE — ASSESSMENT & PLAN NOTE
- Hgb holding stable; some concern for Hgb drop as outpatient. FOBT performed and positive; no gross evidence of bleed.  - will stop getting CBC

## 2018-05-16 NOTE — PROGRESS NOTES
[facility] Hospital Medicine  Progress Note    Patient Name: Ryder Boo  MRN: [id number]  Patient Class: IP- Inpatient   Admission Date: 5/6/2018  Length of Stay: 10 days  Attending Physician: Silver Mcfarland MD  Primary Care Provider: Swapnil Heart MD    [facility] Medicine Team: [facility] HOSP MED 4 Mariaelena Luna MD    Subjective:     Principal Problem:Aspiration pneumonia    HPI:  The patient is a 64 y/o M new to our system, with HTN, COPD and alcoholism, who is transfer from [facility] ICU to [facility] on mechanical ventilation.     The patient lives with his brother across the street from his sister. He was found unresponsive in a chair with small amount of blood in his mouth by his nephew and taken to the hospital by EMS on 5/3. Patient drinks every day and has been having frequent falls recently. Per sister he has quit smoking 15 years ago.  In the ED at OSH he was found to be hypoxic and hypotensive, with undetectable EtOH levels, leukocytosis (24K) and hyponatremia (119) in the labs. he received narcan x1, IV fluids, started on metronidazole and ceftriaxone (also x1 of zosyn, azithromycin, vancomycin, ceftaroline) for aspiration pneumonia, and was put on 15L of Oxygen with non-rebreather, which improved his mental status and BP, however on 4/5 upon weaning off of oxygen he developed hypoxic hypercapnic respiratory failure and subsequent acidosis (pH 7.10), therefore he was intubated and transferred to the ICU. Patient arrived to [facility] on dopamine gtt which per notes was started to maintain BP in the setting of hyponatremia.     Hospital Course:  5/6: upon arrival, patient was taken off of dopamine gtt and received 1.5L IV fluids to maintain MAPs. Continued on vanc/zosyn/azithromycin. Hyponatremia and ROHITH continued to improved with IVF. He was extubated in early afternoon and maintained normal O2 saturations on 3-4L oxygen on NC.   5/7: early morning he was noticed to be rattling while  breathing by the nurse. Upon examination he had increased work of breathing with worsening of bilateral rhonchi. Repeat CXR revealed interval worsening of the patchy infiltrations. Received x1 furosemide 60 mg IV and transitioned to high flow oxygen for possible development of ARDS, he is currently maintaining normal saturations, will keep on intubation watch.  5/8: several episodes of desaturation down to 70% overnight and early morning, that responded well to increasing oxygen to 100%, currently on 35L/50%, mildly drowsy due to precedex. Responded well to diuretics, net -2.8L. Will discontinue precedex and will have speech evaluate his swallowing.  5/9: Uneventful night off precedex. Remains confused, oriented to person and place. Oxygen requirements greatly improved 15L/50% with plan to wean to low-flow NC today. Diuresed -2.9L yesterday and discontinued lasix. Discontinued azithromycin and continued on zosyn. Failed speech eval by SLP and remains NPO  5/11-5/12 patient had NG tube placed, removed it and re-ordered on gentle tube feeds. On high o2 support at night as patient cannot have bipap due to NG.  05/14/2018 Patient passed swallow eval to full liquids. He is now sating 100 % RA family updated awaiting rehab evaluation     Interval History: Patient with no acute events overnight, now on regular diet, has no complaints and is awaiting rehab placement   Review of Systems   Constitutional: Negative for activity change, chills, fatigue and fever.   HENT: Negative for drooling, hearing loss, trouble swallowing and voice change.    Eyes: Negative for pain and visual disturbance.   Respiratory: Negative for cough, shortness of breath and wheezing.    Cardiovascular: Negative for chest pain and palpitations.   Gastrointestinal: Negative for abdominal pain, nausea and vomiting.   Genitourinary: Negative for difficulty urinating and flank pain.   Musculoskeletal: Positive for back pain and gait problem. Negative for  arthralgias, myalgias and neck pain.   Skin: Negative for rash and wound.   Neurological: Negative for dizziness, weakness, numbness and headaches.   Psychiatric/Behavioral: Positive for confusion. Negative for agitation and hallucinations. The patient is not nervous/anxious.      Objective:     Vital Signs (Most Recent):  Temp: 98.5 °F (36.9 °C) (05/16/18 0745)  Pulse: 83 (05/16/18 1002)  Resp: 18 (05/16/18 1002)  BP: 129/74 (05/16/18 0745)  SpO2: 98 % (05/16/18 1002) Vital Signs (24h Range):  Temp:  [97.4 °F (36.3 °C)-98.5 °F (36.9 °C)] 98.5 °F (36.9 °C)  Pulse:  [] 83  Resp:  [16-20] 18  SpO2:  [91 %-98 %] 98 %  BP: (126-161)/(73-88) 129/74     Weight: 76.2 kg (167 lb 15.9 oz)  Body mass index is 27.11 kg/m².    Intake/Output Summary (Last 24 hours) at 05/16/18 1101  Last data filed at 05/15/18 1700   Gross per 24 hour   Intake              480 ml   Output              700 ml   Net             -220 ml      Physical Exam   Constitutional: He appears well-developed and well-nourished. No distress.   HENT:   Head: Normocephalic and atraumatic.   Right Ear: External ear normal.   Left Ear: External ear normal.   Nose: Nose normal.   Eyes: Right eye exhibits no discharge. Left eye exhibits no discharge. No scleral icterus.   Neck: Normal range of motion.   Cardiovascular: Normal rate, regular rhythm and intact distal pulses.    Pulmonary/Chest: Effort normal. No respiratory distress. He has no wheezes.   Abdominal: Soft. He exhibits no distension. There is no tenderness.   Musculoskeletal: Normal range of motion. He exhibits no edema or tenderness.   Neurological: He is alert. He exhibits normal muscle tone.   Skin: Skin is warm and dry. No rash noted.   Psychiatric: He has a normal mood and affect. His behavior is normal. His speech is slurred. Cognition and memory are impaired.   Vitals reviewed.      Significant Labs:   CBC:   Recent Labs  Lab 05/15/18  0423 05/16/18  0535   WBC 9.14 8.17   HGB 8.8* 9.0*    HCT 27.9* 27.5*   * 357*     CMP:   Recent Labs  Lab 05/15/18  0423 05/15/18  1325 05/16/18  0535     --  140   K 2.8* 3.9 3.5     --  105   CO2 25  --  27   GLU 92  --  90   BUN 14  --  16   CREATININE 0.8  --  0.9   CALCIUM 8.6*  --  8.7   PROT 6.6  --  6.7   ALBUMIN 2.0*  --  2.2*   BILITOT 0.5  --  0.5   ALKPHOS 36*  --  36*   AST 16  --  14   ALT 9*  --  7*   ANIONGAP 11  --  8   EGFRNONAA >60.0  --  >60.0       Significant Imaging: I have reviewed and interpreted all pertinent imaging results/findings within the past 24 hours.    Assessment/Plan:      * Aspiration pneumonia    - At OSH had significant leukocytosis with WBCs to 38K.  - CXR with bilateral multifocal infiltration.  - Suspect aspiration secondary to AMS.  - on moxifloxacin        Hypernatremia    resolved          Moderate malnutrition    - now on liquid diet will continue to advance.           Hypomagnesemia    - repleting daily           Hypokalemia    - repleting daily           Anemia    - Hgb holding stable; some concern for Hgb drop as outpatient. FOBT performed and positive; no gross evidence of bleed.  - will stop getting CBC         Encephalopathy, metabolic    -resolved        Acute respiratory failure with hypoxia and hypercapnia    - Former tobacco abuse with history of COPD; concern for volume overload in ICU setting as well and underwent some diuresis. Respiratory status improving now; on 05/10 did have significant respiratory demand prompting CTA; negative for thromboembolic process.  - Continuing albuterol-ipratropium 2.5-0.5mg inhaled q6h wake.  - now on NC  -continue  moxifloxacin  for total treamtent of 15 days : 5/20          Sepsis    - Secondary to aspiration PNA; improved.        Alcoholism /alcohol abuse    - History of alcohol abuse; continuing folic acid, thiamine daily.        ROHITH (acute kidney injury)    - Unclear baseline; Cr. holding stable.          VTE Risk Mitigation         Ordered      enoxaparin injection 40 mg  Daily      05/06/18 1016     Place sequential compression device  Until discontinued      05/06/18 0113     IP VTE LOW RISK PATIENT  Once      05/06/18 0113              Mariaelena Luna MD  Department of Hospital Medicine   [facility]

## 2018-05-16 NOTE — PLAN OF CARE
CM received call this AM from [facility] w/ [facility] that pt's insurance is out of network therefore they are not able to accept pt. CM received a call from arash Pryor plannazanin p [phone] / [facility] who provided several in network choices: [facility], [facility], [facility] [place], [facility], [facility], [facility] [place]. CM contacted pt's sister, Sejal and left VM requested callback to make additional choices.    Update: CM received callback from pt's sister, Sejal - she provided the choice for [facility]. CM emailed @[facility] for referral to be sent.     05/16/18 1033   Discharge Reassessment   Assessment Type Discharge Planning Reassessment   Provided patient/caregiver education on the expected discharge date and the discharge plan Yes   Do you have any problems affording any of your prescribed medications? TBD   Discharge Plan A Rehab   Discharge Plan B Home with family   Patient choice form signed by patient/caregiver N/A   Can the patient answer the patient profile reliably? Yes, cognitively intact   How does the patient rate their overall health at the present time? Fair   Describe the patient's ability to walk at the present time. Major restrictions/daily assistance from another person   How often would a person be available to care for the patient? Often   Number of comorbid conditions (as recorded on the chart) Three

## 2018-05-16 NOTE — PT/OT/SLP EVAL
Speech Language Pathology Evaluation  Cognitive Communication    Patient Name:  Ryder Boo   MRN:  [id number]  Admitting Diagnosis: Aspiration pneumonia    Recommendations:     Recommendations:                General Recommendations:  Cognitive-linguistic therapy  Diet recommendations:  Dental Soft, Thin   Aspiration Precautions: Feed only when awake/alert, HOB to 90 degrees, Small bites/sips and Standard aspiration precautions   General Precautions: Standard, aspiration, fall  Communication strategies:  provide increased time to answer    History:     Past Medical History:   Diagnosis Date    Alcoholism /alcohol abuse 05/06/2018    Aspiration pneumonia 05/05/2018    required ICU, vasopressors, and intubation    COPD (chronic obstructive pulmonary disease)     chronic left upper lobe fibrosis with pleural rxn       No past surgical history on file.    Prior diet: regular/thin.        Subjective     Awake/alert    Pain/Comfort:  · Pain Rating 1: 0/10  · Pain Rating Post-Intervention 1: 0/10    Objective:   Cognitive Status:    Attention Alternating attention deficit     Orientation Person, Place, Situation and Time pt disoriented to year  Memory Immediate Recall 5 numbers/3/5 words  Problem Solving Conclusions 100%, Categories 100%, Sequencing 3/4 ind'ly and 4/4 cued and Compare/contrast 100% provided min cues      Receptive Language:   Comprehension:   Questions Complex yes/no 100% one cue provided  Commands  two step basic commands 100%  multistep basic commands 1/3 accy       Expressive Language:  Verbal:    Naming Confrontation 100%   Pt with word finding difficulty noted throughout spontaneous conversation  Nonverbal:   WFL      Motor Speech:  WFL    Voice:   WFL    Visual-Spatial:  TBD  Reading:   TBD     Written Expression:   TBD    Treatment: Pt tolerated thin liquids x4 throughout session without overt s/s of airway compromise.     Assessment:   Ryder Boo is a 63 y.o. male with an SLP diagnosis of  Dysphagia and Cognitive-Linguistic Impairment.     Goals:    SLP Goals        Problem: SLP Goal    Goal Priority Disciplines Outcome   SLP Goal     SLP Ongoing (interventions implemented as appropriate)   Description:  Goals to be met 5/15  1. Pt will participate in ongoing swallow eval   2. Pt will tolerate full liquids PO diet with no overt s/s of aspiration.   MET  3. Pt will tolerate dental soft diet/thin liquids without overt s/s of aspiration.    4. Pt will complete functional memory task with 80% accy and mod cues  5. Pt will follow complex commands with 70% accy and mod cues  6. Pt will complete problem solving tasks with 70% accy and min cues  7. Pt will complete word finding task with 80% accy and occasional cues.                     Plan:   · Plan of Care reviewed with:  patient   · SLP Follow-Up:  Yes       Discharge recommendations:  Discharge Facility/Level Of Care Needs: rehabilitation facility     Time Tracking:   SLP Treatment Date:   05/16/18  Speech Start Time:  0900  Speech Stop Time:  0908     Speech Total Time (min):  8 min    Billable Minutes: Eval 8     Marley Kimble CCC-SLP  05/16/2018

## 2018-05-16 NOTE — PROGRESS NOTES
PM&R consult follow up.  Please see original consult for detailed note.      PM&R following for potential [facility] admission.  [facility]  Will sign off.      JARROD Calix, FNP-C  Physical Medicine & Rehabilitation   05/16/2018  Spectralink: [id number]

## 2018-05-16 NOTE — PT/OT/SLP PROGRESS
Physical Therapy      Patient Name:  Ryder Boo   MRN:  [id number]    Patient not seen today secondary to pt being seen by OT first attempt, and pt in care of nursing second attempt. Will follow-up at next scheduled visit per PT POC.    Elysia Cabrales, PTA

## 2018-05-16 NOTE — PLAN OF CARE
Problem: Patient Care Overview  Goal: Plan of Care Review  Dx: Pneumonia   Hx: HTN, COPD and alcoholism, who is a transfer from [facility] ICU to [facility] on mechanical ventilation after being found unresponsive at home    4/5: Transferred to [facility] on mechanical ventilation. Dopamine gtt off.   4/6: Pt extubated  5/7: Echo 50-55%         RN  MAP>65    *Pt likes [employer] channel 3        Outcome: Ongoing (interventions implemented as appropriate)  POC reviewed with patient. AAOx4, VSS per patient trend. Patient worked with OT, tolerated well. Up in chair for 6 hours, no complaints of pain. Condom catheter in place. No evidence of distress, remains free of falls. Call light within reach, safety maintained. Hourly rounding performed, will continue to monitor.

## 2018-05-17 LAB
ALBUMIN SERPL BCP-MCNC: 2.1 G/DL
ALP SERPL-CCNC: 37 U/L
ALT SERPL W/O P-5'-P-CCNC: 11 U/L
ANION GAP SERPL CALC-SCNC: 9 MMOL/L
AST SERPL-CCNC: 18 U/L
BILIRUB SERPL-MCNC: 0.3 MG/DL
BUN SERPL-MCNC: 22 MG/DL
CALCIUM SERPL-MCNC: 8.7 MG/DL
CHLORIDE SERPL-SCNC: 102 MMOL/L
CO2 SERPL-SCNC: 25 MMOL/L
CREAT SERPL-MCNC: 0.9 MG/DL
EST. GFR  (AFRICAN AMERICAN): >60 ML/MIN/1.73 M^2
EST. GFR  (NON AFRICAN AMERICAN): >60 ML/MIN/1.73 M^2
GLUCOSE SERPL-MCNC: 102 MG/DL
MAGNESIUM SERPL-MCNC: 1.6 MG/DL
POTASSIUM SERPL-SCNC: 3.7 MMOL/L
PROT SERPL-MCNC: 6.6 G/DL
SODIUM SERPL-SCNC: 136 MMOL/L

## 2018-05-17 PROCEDURE — 94640 AIRWAY INHALATION TREATMENT: CPT

## 2018-05-17 PROCEDURE — 25000242 PHARM REV CODE 250 ALT 637 W/ HCPCS: Performed by: STUDENT IN AN ORGANIZED HEALTH CARE EDUCATION/TRAINING PROGRAM

## 2018-05-17 PROCEDURE — 92526 ORAL FUNCTION THERAPY: CPT

## 2018-05-17 PROCEDURE — 83735 ASSAY OF MAGNESIUM: CPT

## 2018-05-17 PROCEDURE — 25000003 PHARM REV CODE 250: Performed by: STUDENT IN AN ORGANIZED HEALTH CARE EDUCATION/TRAINING PROGRAM

## 2018-05-17 PROCEDURE — 20600001 HC STEP DOWN PRIVATE ROOM

## 2018-05-17 PROCEDURE — 97803 MED NUTRITION INDIV SUBSEQ: CPT

## 2018-05-17 PROCEDURE — 94761 N-INVAS EAR/PLS OXIMETRY MLT: CPT

## 2018-05-17 PROCEDURE — 97110 THERAPEUTIC EXERCISES: CPT

## 2018-05-17 PROCEDURE — 25000242 PHARM REV CODE 250 ALT 637 W/ HCPCS: Performed by: INTERNAL MEDICINE

## 2018-05-17 PROCEDURE — 97535 SELF CARE MNGMENT TRAINING: CPT

## 2018-05-17 PROCEDURE — 99232 SBSQ HOSP IP/OBS MODERATE 35: CPT | Mod: ,,, | Performed by: HOSPITALIST

## 2018-05-17 PROCEDURE — 80053 COMPREHEN METABOLIC PANEL: CPT

## 2018-05-17 PROCEDURE — 97530 THERAPEUTIC ACTIVITIES: CPT

## 2018-05-17 PROCEDURE — 63600175 PHARM REV CODE 636 W HCPCS: Performed by: STUDENT IN AN ORGANIZED HEALTH CARE EDUCATION/TRAINING PROGRAM

## 2018-05-17 PROCEDURE — 27000221 HC OXYGEN, UP TO 24 HOURS

## 2018-05-17 PROCEDURE — 92507 TX SP LANG VOICE COMM INDIV: CPT

## 2018-05-17 PROCEDURE — 25000003 PHARM REV CODE 250: Performed by: INTERNAL MEDICINE

## 2018-05-17 RX ORDER — IPRATROPIUM BROMIDE AND ALBUTEROL SULFATE 2.5; .5 MG/3ML; MG/3ML
3 SOLUTION RESPIRATORY (INHALATION) EVERY 4 HOURS PRN
Status: DISCONTINUED | OUTPATIENT
Start: 2018-05-17 | End: 2018-05-25

## 2018-05-17 RX ORDER — LANOLIN ALCOHOL/MO/W.PET/CERES
400 CREAM (GRAM) TOPICAL 2 TIMES DAILY
Status: COMPLETED | OUTPATIENT
Start: 2018-05-17 | End: 2018-05-17

## 2018-05-17 RX ORDER — IPRATROPIUM BROMIDE AND ALBUTEROL SULFATE 2.5; .5 MG/3ML; MG/3ML
3 SOLUTION RESPIRATORY (INHALATION) ONCE
Status: COMPLETED | OUTPATIENT
Start: 2018-05-17 | End: 2018-05-17

## 2018-05-17 RX ADMIN — ENOXAPARIN SODIUM 40 MG: 100 INJECTION SUBCUTANEOUS at 05:05

## 2018-05-17 RX ADMIN — IPRATROPIUM BROMIDE AND ALBUTEROL SULFATE 3 ML: .5; 3 SOLUTION RESPIRATORY (INHALATION) at 09:05

## 2018-05-17 RX ADMIN — MOXIFLOXACIN HYDROCHLORIDE 400 MG: 400 TABLET, FILM COATED ORAL at 09:05

## 2018-05-17 RX ADMIN — MAGNESIUM OXIDE TAB 400 MG (241.3 MG ELEMENTAL MG) 400 MG: 400 (241.3 MG) TAB at 09:05

## 2018-05-17 RX ADMIN — IPRATROPIUM BROMIDE AND ALBUTEROL SULFATE 3 ML: .5; 3 SOLUTION RESPIRATORY (INHALATION) at 12:05

## 2018-05-17 RX ADMIN — THIAMINE HCL TAB 100 MG 100 MG: 100 TAB at 09:05

## 2018-05-17 RX ADMIN — FOLIC ACID 1 MG: 1 TABLET ORAL at 09:05

## 2018-05-17 NOTE — PLAN OF CARE
Covering [employer] sent message via [employer] and left voice mail message at [facility] to [facility] on acceptance.    [facility] stating that [facility] are currently reviewing referral.      Awaiting facility acceptance and insurance authorization.    Belinda Ignacio LMSW

## 2018-05-17 NOTE — PROGRESS NOTES
[facility] Hospital Medicine  Progress Note    Patient Name: Ryder Boo  MRN: [id number]  Patient Class: IP- Inpatient   Admission Date: 5/6/2018  Length of Stay: 11 days  Attending Physician: Sliver Mcfarland MD  Primary Care Provider: Swapnil Heart MD    [facility] Medicine Team: [facility] MED 4 Mariaelena Luna MD    Subjective:     Principal Problem:Aspiration pneumonia    HPI:  The patient is a 62 y/o M new to our system, with HTN, COPD and alcoholism, who is transfer from [facility] ICU to [facility] on mechanical ventilation.     The patient lives with his brother across the street from his sister. He was found unresponsive in a chair with small amount of blood in his mouth by his nephew and taken to the hospital by EMS on 5/3. Patient drinks every day and has been having frequent falls recently. Per sister he has quit smoking 15 years ago.  In the ED at OSH he was found to be hypoxic and hypotensive, with undetectable EtOH levels, leukocytosis (24K) and hyponatremia (119) in the labs. he received narcan x1, IV fluids, started on metronidazole and ceftriaxone (also x1 of zosyn, azithromycin, vancomycin, ceftaroline) for aspiration pneumonia, and was put on 15L of Oxygen with non-rebreather, which improved his mental status and BP, however on 4/5 upon weaning off of oxygen he developed hypoxic hypercapnic respiratory failure and subsequent acidosis (pH 7.10), therefore he was intubated and transferred to the ICU. Patient arrived to [facility] on dopamine gtt which per notes was started to maintain BP in the setting of hyponatremia.     Hospital Course:  5/6: upon arrival, patient was taken off of dopamine gtt and received 1.5L IV fluids to maintain MAPs. Continued on vanc/zosyn/azithromycin. Hyponatremia and ROHITH continued to improved with IVF. He was extubated in early afternoon and maintained normal O2 saturations on 3-4L oxygen on NC.   5/7: early morning he was noticed to be rattling while  breathing by the nurse. Upon examination he had increased work of breathing with worsening of bilateral rhonchi. Repeat CXR revealed interval worsening of the patchy infiltrations. Received x1 furosemide 60 mg IV and transitioned to high flow oxygen for possible development of ARDS, he is currently maintaining normal saturations, will keep on intubation watch.  5/8: several episodes of desaturation down to 70% overnight and early morning, that responded well to increasing oxygen to 100%, currently on 35L/50%, mildly drowsy due to precedex. Responded well to diuretics, net -2.8L. Will discontinue precedex and will have speech evaluate his swallowing.  5/9: Uneventful night off precedex. Remains confused, oriented to person and place. Oxygen requirements greatly improved 15L/50% with plan to wean to low-flow NC today. Diuresed -2.9L yesterday and discontinued lasix. Discontinued azithromycin and continued on zosyn. Failed speech eval by SLP and remains NPO  5/11-5/12 patient had NG tube placed, removed it and re-ordered on gentle tube feeds. On high o2 support at night as patient cannot have bipap due to NG.  05/14/2018 -Patient passed swallow eval to full liquids. He is now sating 100 % RA family updated awaiting rehab evaluation   05/17/2018 Patient was desating in the morning to 85-88 but he was alert oriented and no complaints gave him a breathing treatment     Interval History: Patient with no acute events overnight. Patient was desating in the morning to 85-88 but he was alert oriented and no complaints gave him a breathing treatment       Review of Systems   Constitutional: Negative for activity change, chills, fatigue and fever.   HENT: Negative for drooling, hearing loss, trouble swallowing and voice change.    Eyes: Negative for pain and visual disturbance.   Respiratory: Negative for cough, shortness of breath and wheezing.    Cardiovascular: Negative for chest pain and palpitations.   Gastrointestinal:  Negative for abdominal pain, nausea and vomiting.   Genitourinary: Negative for difficulty urinating and flank pain.   Musculoskeletal: Positive for back pain and gait problem. Negative for arthralgias, myalgias and neck pain.   Skin: Negative for rash and wound.   Neurological: Negative for dizziness, weakness, numbness and headaches.   Psychiatric/Behavioral: Positive for confusion. Negative for agitation and hallucinations. The patient is not nervous/anxious.      Objective:     Vital Signs (Most Recent):  Temp: 99.2 °F (37.3 °C) (05/17/18 0715)  Pulse: 99 (05/17/18 0943)  Resp: 18 (05/17/18 0943)  BP: 138/85 (05/17/18 0715)  SpO2: (!) 88 % (05/17/18 0943) Vital Signs (24h Range):  Temp:  [98 °F (36.7 °C)-99.2 °F (37.3 °C)] 99.2 °F (37.3 °C)  Pulse:  [] 99  Resp:  [18-20] 18  SpO2:  [88 %-98 %] 88 %  BP: (129-157)/(73-88) 138/85     Weight: 76.2 kg (167 lb 15.9 oz)  Body mass index is 27.11 kg/m².    Intake/Output Summary (Last 24 hours) at 05/17/18 1127  Last data filed at 05/17/18 0500   Gross per 24 hour   Intake                0 ml   Output             1100 ml   Net            -1100 ml      Physical Exam   Constitutional: He appears well-developed and well-nourished. No distress.   HENT:   Head: Normocephalic and atraumatic.   Right Ear: External ear normal.   Left Ear: External ear normal.   Nose: Nose normal.   Eyes: Right eye exhibits no discharge. Left eye exhibits no discharge. No scleral icterus.   Neck: Normal range of motion.   Cardiovascular: Normal rate, regular rhythm and intact distal pulses.    Pulmonary/Chest: Effort normal. No respiratory distress. He has no wheezes.   Abdominal: Soft. He exhibits no distension. There is no tenderness.   Musculoskeletal: Normal range of motion. He exhibits no edema or tenderness.   Neurological: He is alert. He exhibits normal muscle tone.   Skin: Skin is warm and dry. No rash noted.   Psychiatric: He has a normal mood and affect. His behavior is normal.  His speech is slurred. Cognition and memory are impaired.   Vitals reviewed.      Significant Labs:   CBC:   Recent Labs  Lab 05/16/18  0535   WBC 8.17   HGB 9.0*   HCT 27.5*   *     CMP:   Recent Labs  Lab 05/15/18  1325 05/16/18  0535 05/17/18  0445   NA  --  140 136   K 3.9 3.5 3.7   CL  --  105 102   CO2  --  27 25   GLU  --  90 102   BUN  --  16 22   CREATININE  --  0.9 0.9   CALCIUM  --  8.7 8.7   PROT  --  6.7 6.6   ALBUMIN  --  2.2* 2.1*   BILITOT  --  0.5 0.3   ALKPHOS  --  36* 37*   AST  --  14 18   ALT  --  7* 11   ANIONGAP  --  8 9   EGFRNONAA  --  >60.0 >60.0     All pertinent labs within the past 24 hours have been reviewed.    Significant Imaging: I have reviewed and interpreted all pertinent imaging results/findings within the past 24 hours.    Assessment/Plan:      * Aspiration pneumonia    - At OSH had significant leukocytosis with WBCs to 38K.  - CXR with bilateral multifocal infiltration.  - Suspect aspiration secondary to AMS.  - on moxifloxacin        Hypernatremia    resolved          Moderate malnutrition    - now on liquid diet will continue to advance.           Hypomagnesemia    - repleting daily           Hypokalemia    - repleting daily           Anemia    - Hgb holding stable; some concern for Hgb drop as outpatient. FOBT performed and positive; no gross evidence of bleed.  - will stop getting CBC         Encephalopathy, metabolic    -resolved        Acute respiratory failure with hypoxia and hypercapnia    - Former tobacco abuse with history of COPD; concern for volume overload in ICU setting as well and underwent some diuresis. Respiratory status improving now; on 05/10 did have significant respiratory demand prompting CTA; negative for thromboembolic process.  - Continuing albuterol-ipratropium 2.5-0.5mg inhaled PRN   - now on NC, today was having trouble with desating in th 88% per AI, will give an inhaled treatment as we had them PRN   -continue  moxifloxacin  for total  treamtent of 15 days : 5/20          Sepsis    - Secondary to aspiration PNA; improved.        Alcoholism /alcohol abuse    - History of alcohol abuse; continuing folic acid, thiamine daily.        ROHITH (acute kidney injury)    - Unclear baseline; Cr. holding stable.          VTE Risk Mitigation         Ordered     enoxaparin injection 40 mg  Daily      05/06/18 1016     Place sequential compression device  Until discontinued      05/06/18 0113     IP VTE LOW RISK PATIENT  Once      05/06/18 0113              Mariaelena Luna MD  Department of Hospital Medicine   [facility]

## 2018-05-17 NOTE — ASSESSMENT & PLAN NOTE
- Former tobacco abuse with history of COPD; concern for volume overload in ICU setting as well and underwent some diuresis. Respiratory status improving now; on 05/10 did have significant respiratory demand prompting CTA; negative for thromboembolic process.  - Continuing albuterol-ipratropium 2.5-0.5mg inhaled PRN   - now on NC, today was having trouble with desating in th 88% per AI, will give an inhaled treatment as we had them PRN   -continue  moxifloxacin  for total treamtent of 15 days : 5/20

## 2018-05-17 NOTE — CARE UPDATE
RN Proactive Rounding Note  Time of Visit: 16:27    Admit Date: 2018  LOS: 11  Code Status: Full Code   Date of Visit: 2018  : 1955  Age: 63 y.o.  Sex: male  [facility]   MRN: [id number]  Was the patient discharged from an ICU this admission? yes   Was the patient discharged from a PACU within last 24 hours? no  Did the patient receive conscious sedation/general anesthesia in last 24 hours? no  Was the patient in the ED within the past 24 hours? no  Was the patient started on NIPPV within the past 24 hours? no  Attending Physician: Silver Mcfarland MD  Primary Service: [facility] MED 4      ASSESSMENT:     Abnormal Vital Signs: sat 88%   Clinical Issues: Respiratory     INTERVENTIONS/ RECOMMENDATIONS:     Patient seen as follow up for AI alert seen by [facility] this am.   Patient was trialed on room air this am, sats 88% per RN.   Patient was placed back on 2L NC per CCS NP.   Upon bedside exam, patient resting comfortably in bed.   Vitals stable, sats 100%.   Patient hemodynamically stable at this time.     Discussed plan of care with JAIME Morales     PHYSICIAN ESCALATION:     Yes/No no    Orders received and case discussed with   n/a     Disposition: [facility]     FOLLOW-UP/CONTINGENCY:   No follow up at this time.     Call back the Rapid Response Nurse at x [phone]  for additional questions or concerns

## 2018-05-17 NOTE — PLAN OF CARE
Problem: Physical Therapy Goal  Goal: Physical Therapy Goal  Goals to be met by: 2018    Patient will increase functional independence with mobility by performin. Supine to sit with Moderate Assistance - MET  Revised: Supine to sit with CGA  2. Sit to supine with Moderate Assistance -  Met    Revised: Sit to supine with CGA  3. Sit to stand transfer with Moderate Assistance using LRAD or no AD - not met  4. Bed to chair transfer with Moderate Assistance using LRAD or no AD - not met  5. Gait  x 20 feet with Moderate Assistance using LRAD or no AD - not met  6. Lower extremity exercise program x20 reps per handout, with supervision - not met       Outcome: Ongoing (interventions implemented as appropriate)  No goals met on today. Goals remain appropriate.    Ana Stiles, PT, DPT  2018

## 2018-05-17 NOTE — PLAN OF CARE
Problem: SLP Goal  Goal: SLP Goal  Goals to be met 5/15  1. Pt will participate in ongoing swallow eval   2. Pt will tolerate full liquids PO diet with no overt s/s of aspiration.   MET  3. Pt will tolerate dental soft diet/thin liquids without overt s/s of aspiration.    4. Pt will complete functional memory task with 80% accy and mod cues  5. Pt will follow complex commands with 70% accy and mod cues  6. Pt will complete problem solving tasks with 70% accy and min cues  7. Pt will complete word finding task with 80% accy and occasional cues.    Outcome: Ongoing (interventions implemented as appropriate)  Pt participated well w/ tx session.  Tolerating current diet.  Cont ST per POC.    Andra Jones, SANDRA-SLP  5/17/2018

## 2018-05-17 NOTE — PLAN OF CARE
Artificial Intelligence Miki      Admit Date: 2018  LOS: 11  Code Status: Full Code   Date of Consult: 2018  : 1955  Age: 63 y.o.  Weight:   Wt Readings from Last 1 Encounters:   18 76.2 kg (167 lb 15.9 oz)     Sex: male  [facility] A:   MRN: [id number]  Attending Physician: Silver Mcfarland MD  Primary Service: [facility] MED 4  Time AI Alert Received: 1005   Time at Bedside: 1025           Patient found sitting up in bed, on room air, eating breakfast, and in no apparent distress. After reviewing patient's chart, his O2 saturation was documented as 88%. A nursing communication order was found in the chart stating that an O2 sat of 88% or greater is acceptable on room air. Upon speaking with the patient who denied any sob or respiratory discomfort, a repeat O2 sat was obtained where he varied from 85-88% on room air. Nasal cannula on 2L was applied where his O2 sat improved to 88-91%. In addition, auscultation of his lungs revealed bilateral rhonchi in upper and middle lobes, and rales in the left lower lobe. No wheezing appreciated. This was communicated to both his nurse and his primary medicine team 4. The team indicated they plan to administer a breathing treatment and have declined a critical care consult at this time.       Vital Signs (Most Recent):  Temp: 99.2 °F (37.3 °C) (18 0715)  Pulse: 99 (18 0943)  Resp: 18 (18 0943)  BP: 138/85 (18 0715)  SpO2: (!) 88 % (18 0943) Vital Signs (24h Range):  Temp:  [98 °F (36.7 °C)-99.2 °F (37.3 °C)] 99.2 °F (37.3 °C)  Pulse:  [] 99  Resp:  [18-20] 18  SpO2:  [88 %-98 %] 88 %  BP: (129-157)/(73-88) 138/85         This is an  Artificial Intelligence Notification.     Artificial Intelligence alert discussed with Primary team:  Dr. Jeremy ALMAZAN 4    Please place a Critical Care consult if evaluation/consultation is needed  The Critical Care Fellow can be reached at x [phone]

## 2018-05-17 NOTE — PLAN OF CARE
CM to bedside to update to on d/c plan and pending acceptance/waiting on [facility] - pt in good spirits and was getting ready to start therapy session.    CM received call from pt's sister, Sejal -MARK updated that still awaiting update from [facility] but will contact her as soon as new info available.

## 2018-05-17 NOTE — PT/OT/SLP PROGRESS
Occupational Therapy   Treatment    Name: Ryder Boo  MRN: [id number]  Admitting Diagnosis:  Aspiration pneumonia       Recommendations:     Discharge Recommendations:    Discharge Equipment Recommendations:  bedside commode, shower chair, walker, rolling  Barriers to discharge:  Decreased caregiver support    Subjective     Communicated with: nsg prior to session.  Pain/Comfort:  · Pain Rating 1: 0/10    Patients cultural, spiritual, Denominational conflicts given the current situation: none    Objective:     Patient found with: oxygen, hess catheter    General Precautions: Standard, aspiration, fall   Orthopedic Precautions:N/A   Braces:       Occupational Performance:    Bed Mobility:    · Sup to sit with spvn  · Scooting to EOB with spvn and increased time     Functional Mobility/Transfers:  · Sit to stand with CGA  · Functional Mobility: static standing x ~1 min with tremulous LE's, declined further attempts to take steps at EOB  · Scooting in sitting with CGA and min assist x 5    Activities of Daily Living:  · Brushed teeth seated with setup assist, combed hair setup   · Gown with setup/SBA  · Donned socks EOB with SBA and increased time    Patient left HOB elevated with call button in reach and bed alarm on    AMPA 6 Click:  [facility] Total Score:      Treatment & Education:  Performed above activities, performed BLE quad sets and ankle pumps seated EOB, BUE shoulder presses and cross body punches x 15 reps, educated on plan of care, exercises in bed, safety with mobility techniques and functional activity  Education:    Assessment:     Ryder Boo is a 63 y.o. male with a medical diagnosis of Aspiration pneumonia.  He presents with improvement in adl's and mobility.  Performance deficits affecting function are weakness, impaired endurance, impaired self care skills, impaired balance, gait instability, impaired functional mobilty, impaired coordination, impaired cardiopulmonary response to activity.      Rehab  Prognosis:  Good ; patient would benefit from acute skilled OT services to address these deficits and reach maximum level of function.       Plan:     Patient to be seen 4 x/week to address the above listed problems via self-care/home management, therapeutic activities, therapeutic exercises  · Plan of Care Expires: 06/07/18  · Plan of Care Reviewed with: patient    This Plan of care has been discussed with the patient who was involved in its development and understands and is in agreement with the identified goals and treatment plan    GOALS:    Occupational Therapy Goals        Problem: Occupational Therapy Goal    Goal Priority Disciplines Outcome Interventions   Occupational Therapy Goal     OT, PT/OT Unable to achieve outcome(s) by discharge    Description:  Goals to be met by: 5/22/18     Patient will increase functional independence with ADLs by performing:    Feeding with Supervision.  UE Dressing with Minimal Assistance. MET 5/16  LE Dressing with Moderate Assistance. MET 5/16  Grooming while standing with Contact Guard Assistance.  Toileting from bedside commode with Moderate Assistance for hygiene and clothing management.   Toilet transfer to bedside commode with Minimal Assistance .                        Time Tracking:     OT Date of Treatment: 05/17/18  OT Start Time: 1425  OT Stop Time: 1450  OT Total Time (min): 25 min    Billable Minutes:Self Care/Home Management 10 min  Therapeutic Activity 15 min    Leesa Hinojosa OT  5/17/2018

## 2018-05-17 NOTE — SUBJECTIVE & OBJECTIVE
Interval History: Patient with no acute events overnight. Patient was desating in the morning to 85-88 but he was alert oriented and no complaints gave him a breathing treatment       Review of Systems   Constitutional: Negative for activity change, chills, fatigue and fever.   HENT: Negative for drooling, hearing loss, trouble swallowing and voice change.    Eyes: Negative for pain and visual disturbance.   Respiratory: Negative for cough, shortness of breath and wheezing.    Cardiovascular: Negative for chest pain and palpitations.   Gastrointestinal: Negative for abdominal pain, nausea and vomiting.   Genitourinary: Negative for difficulty urinating and flank pain.   Musculoskeletal: Positive for back pain and gait problem. Negative for arthralgias, myalgias and neck pain.   Skin: Negative for rash and wound.   Neurological: Negative for dizziness, weakness, numbness and headaches.   Psychiatric/Behavioral: Positive for confusion. Negative for agitation and hallucinations. The patient is not nervous/anxious.      Objective:     Vital Signs (Most Recent):  Temp: 99.2 °F (37.3 °C) (05/17/18 0715)  Pulse: 99 (05/17/18 0943)  Resp: 18 (05/17/18 0943)  BP: 138/85 (05/17/18 0715)  SpO2: (!) 88 % (05/17/18 0943) Vital Signs (24h Range):  Temp:  [98 °F (36.7 °C)-99.2 °F (37.3 °C)] 99.2 °F (37.3 °C)  Pulse:  [] 99  Resp:  [18-20] 18  SpO2:  [88 %-98 %] 88 %  BP: (129-157)/(73-88) 138/85     Weight: 76.2 kg (167 lb 15.9 oz)  Body mass index is 27.11 kg/m².    Intake/Output Summary (Last 24 hours) at 05/17/18 1127  Last data filed at 05/17/18 0500   Gross per 24 hour   Intake                0 ml   Output             1100 ml   Net            -1100 ml      Physical Exam   Constitutional: He appears well-developed and well-nourished. No distress.   HENT:   Head: Normocephalic and atraumatic.   Right Ear: External ear normal.   Left Ear: External ear normal.   Nose: Nose normal.   Eyes: Right eye exhibits no discharge.  Left eye exhibits no discharge. No scleral icterus.   Neck: Normal range of motion.   Cardiovascular: Normal rate, regular rhythm and intact distal pulses.    Pulmonary/Chest: Effort normal. No respiratory distress. He has no wheezes.   Abdominal: Soft. He exhibits no distension. There is no tenderness.   Musculoskeletal: Normal range of motion. He exhibits no edema or tenderness.   Neurological: He is alert. He exhibits normal muscle tone.   Skin: Skin is warm and dry. No rash noted.   Psychiatric: He has a normal mood and affect. His behavior is normal. His speech is slurred. Cognition and memory are impaired.   Vitals reviewed.      Significant Labs:   CBC:   Recent Labs  Lab 05/16/18  0535   WBC 8.17   HGB 9.0*   HCT 27.5*   *     CMP:   Recent Labs  Lab 05/15/18  1325 05/16/18  0535 05/17/18  0445   NA  --  140 136   K 3.9 3.5 3.7   CL  --  105 102   CO2  --  27 25   GLU  --  90 102   BUN  --  16 22   CREATININE  --  0.9 0.9   CALCIUM  --  8.7 8.7   PROT  --  6.7 6.6   ALBUMIN  --  2.2* 2.1*   BILITOT  --  0.5 0.3   ALKPHOS  --  36* 37*   AST  --  14 18   ALT  --  7* 11   ANIONGAP  --  8 9   EGFRNONAA  --  >60.0 >60.0     All pertinent labs within the past 24 hours have been reviewed.    Significant Imaging: I have reviewed and interpreted all pertinent imaging results/findings within the past 24 hours.

## 2018-05-17 NOTE — PT/OT/SLP PROGRESS
"Speech Language Pathology Treatment    Patient Name:  Ryder Boo   MRN:  [id number]  Admitting Diagnosis: Aspiration pneumonia    Recommendations:                 General Recommendations:  Dysphagia therapy and Cognitive-linguistic therapy  Diet recommendations:  Dental Soft, Liquid Diet Level: Thin   Aspiration Precautions: 1 bite/sip at a time, Alternating bites/sips, Meds whole 1 at a time, Small bites/sips and Standard aspiration precautions   General Precautions: Standard, aspiration, fall  Communication strategies:  none    Subjective     "I'm doing alright."  Pt stated when asked  Patient goals: none expressed     Pain/Comfort:  · Pain Rating 1: 0/10  · Pain Rating Post-Intervention 1: 0/10    Objective:     Has the patient been evaluated by SLP for swallowing?   Yes  Keep patient NPO? No   Current Respiratory Status: nasal cannula      Pt was awake and alert in NAD upon SLP presentation.  He was agreeable to tx session.  He completed word-finding tasks w/ naming object described w/ 90% acc indep up to 100% cued.  He completed problem-solving tasks w/ abstract convergent and divergent categorical naming w/ 60% acc for convergent and 100% acc for divergent categories.    Pt was offered and accepted po trials of soft solids and thin liquids by straw (Boost).  He tolerated all trials well w/ adequate oral clearance and no overt signs of airway compromise.  Education was provided to pt re:  SLP role, diet recs, aspiration precautions and SLP POC.  He indicated good understanding.    Assessment:     Ryder Boo is a 63 y.o. male with an SLP diagnosis of Dysphagia and Cognitive-Linguistic Impairment.      Goals:    SLP Goals        Problem: SLP Goal    Goal Priority Disciplines Outcome   SLP Goal     SLP Ongoing (interventions implemented as appropriate)   Description:  Goals to be met 5/15  1. Pt will participate in ongoing swallow eval   2. Pt will tolerate full liquids PO diet with no overt s/s of aspiration. "   MET  3. Pt will tolerate dental soft diet/thin liquids without overt s/s of aspiration.    4. Pt will complete functional memory task with 80% accy and mod cues  5. Pt will follow complex commands with 70% accy and mod cues  6. Pt will complete problem solving tasks with 70% accy and min cues  7. Pt will complete word finding task with 80% accy and occasional cues.                     Plan:     · Patient to be seen:  5 x/week   · Plan of Care expires:     · Plan of Care reviewed with:  patient   · SLP Follow-Up:  Yes       Discharge recommendations:  rehabilitation facility   Barriers to Discharge:  None    Time Tracking:     SLP Treatment Date:   05/17/18  Speech Start Time:  1347  Speech Stop Time:  1410     Speech Total Time (min):  23 min    Billable Minutes: Speech Therapy Individual 13 and Treatment Swallowing Dysfunction 10    Andra Jones CCC-SLP  05/17/2018

## 2018-05-17 NOTE — PROGRESS NOTES
"  [facility]  Adult Nutrition  Consult Note    SUMMARY     Recommendations    1. Continue current mechanical soft diet + Boost Plus ONS.   2. RD to monitor & follow-up.    Goals: Meet % EEN, EPN  Nutrition Goal Status: goal met  Communication of RD Recs: reviewed with RN    Reason for Assessment    Reason for Assessment: RD follow-up  Diagnosis:  ( Aspiration pneumonia )  Relevant Medical History: EtOH abuse, COPD    General Information Comments: ST recommends mechanical soft diet w/ thin liquids. Pt reports good appetite, consuming breakfast at time of visit. Drinking ONS. TF discontinued.   Nutrition Discharge Planning: Adequate PO intake    Nutrition/Diet History    Patient Reported Diet/Restrictions/Preferences: general  Food Preferences: none  Do you have any cultural, spiritual, Scientology conflicts, given your current situation?: none  Food Allergies: NKFA  Factors Affecting Nutritional Intake: None identified at this time    Anthropometrics    Temp: 99.2 °F (37.3 °C)  Height Method: Estimated  Height: 5' 6" (167.6 cm)  Height (inches): 66 in  Weight Method: Bed Scale  Weight: 76.2 kg (167 lb 15.9 oz)  Weight (lb): 167.99 lb  Ideal Body Weight (IBW), Male: 142 lb  % Ideal Body Weight, Male (lb): 118.3 lb  BMI (Calculated): 27.2  BMI Grade: 25 - 29.9 - overweight  Weight Loss: unintentional  Usual Body Weight (UBW), k.1 kg  Weight Change Amount: 19 lb 9.9 oz  % Usual Body Weight: 89.73  % Weight Change From Usual Weight: -10.46 %    Lab/Procedures/Meds    Pertinent Labs Reviewed: reviewed  Pertinent Labs Comments: Stable  Pertinent Medications Reviewed: reviewed  Pertinent Medications Comments: Thiamine, Folic acid    Physical Findings/Assessment    Overall Physical Appearance: nourished  Oral/Mouth Cavity: WDL  Skin: pressure ulcer(s) (Left Buttocks)    Estimated/Assessed Needs    Weight Used For Calorie Calculations: 76.2 kg (167 lb 15.9 oz)     Energy Calorie Requirements " (kcal): 1881 kcal/d  Energy Need Method: [place]-St Jeor (1.25 PAL)     Protein Requirements: 76-92 g/d (1-1.2 g/kg)  Weight Used For Protein Calculations: 76.2 kg (167 lb 15.9 oz)     Fluid Requirements (mL): 1 mL/kcal or per MD    Nutrition Prescription Ordered    Current Diet Order: Mechanical soft + Boost Plus ONS  Current Nutrition Support Formula Ordered: Discontinued     Nutrition Risk    Level of Risk/Frequency of Follow-up: moderate     Assessment and Plan    Moderate malnutrition     Malnutrition in the context of Acute Illness/Injury     Related to (etiology):  No nutrient intake for the last week     Signs and Symptoms (as evidenced by):  Energy Intake: 0 of estimated energy requirement for 1+ wk  Body Fat Depletion: mild depletion of orbital's, triceps and thoracic and lumbar region   Muscle Mass Depletion: mild depletion of temples, clavicle region, interosseous muscle and lower extremities   Weight Loss: 10.5% x 2 wks      Nutrition Diagnosis Status:  Continues, Improving      Monitor and Evaluation    Food and Nutrient Intake: energy intake, food and beverage intake  Food and Nutrient Adminstration: diet order  Physical Activity and Function: nutrition-related ADLs and IADLs  Anthropometric Measurements: weight, weight change  Biochemical Data, Medical Tests and Procedures: lipid profile, inflammatory profile, glucose/endocrine profile, gastrointestinal profile, electrolyte and renal panel  Nutrition-Focused Physical Findings: overall appearance     Nutrition Follow-Up    RD Follow-up?: Yes

## 2018-05-17 NOTE — PLAN OF CARE
Problem: Occupational Therapy Goal  Goal: Occupational Therapy Goal  Goals to be met by: 5/22/18     Patient will increase functional independence with ADLs by performing:    Feeding with Supervision.  UE Dressing with Minimal Assistance. MET 5/16  LE Dressing with Moderate Assistance. MET 5/16  Grooming while standing with Contact Guard Assistance.  Toileting from bedside commode with Moderate Assistance for hygiene and clothing management.   Toilet transfer to bedside commode with Minimal Assistance .       Outcome: Unable to achieve outcome(s) by discharge  con't with goals  Leesa Hinojosa, OTR'

## 2018-05-17 NOTE — PT/OT/SLP PROGRESS
"Physical Therapy Treatment    Patient Name:  Ryder Boo   MRN:  [id number]    Recommendations:     Discharge Recommendations:  rehabilitation facility   Discharge Equipment Recommendations: walker, rolling, bedside commode, shower chair   Barriers to discharge: Decreased caregiver support    Assessment:     Ryder Boo is a 63 y.o. male admitted with a medical diagnosis of Aspiration pneumonia.  He presents with the following impairments/functional limitations:  impaired endurance, impaired balance, decreased upper extremity function, impaired cardiopulmonary response to activity, visual deficits, decreased safety awareness, decreased coordination.    Rehab Prognosis:  Good; patient would benefit from acute skilled PT services to address these deficits and reach maximum level of function.      Recent Surgery: * No surgery found *      Plan:     During this hospitalization, patient to be seen 4 x/week to address the above listed problems via gait training, therapeutic activities, therapeutic exercises, neuromuscular re-education  · Plan of Care Expires:  06/06/18   Plan of Care Reviewed with: patient    Subjective     Communicated with nsg prior to session.  Patient found supine in bed upon PT entry to room, agreeable to treatment.      Chief Complaint: discomfort with WB in knees  Patient comments/goals: " I just can't get it today" per pt during session.  Pain/Comfort:  · Pt did not provide numerical rating for pain in knees and feet while attempting to stand.     Patients cultural, spiritual, Yazidi conflicts given the current situation: none    Objective:     Patient found with: hess catheter, oxygen     General Precautions: Standard, fall   Orthopedic Precautions:N/A   Braces: N/A     Functional Mobility  Bed Mobility  Scooting: contact guard assistance  Supine to Sit: minimum assistance   Sit to Supine: minimum assistance   Transfers Sit to Stand:  maximal assistance with no AD and with AD for 3 " trials           Balance   Static Sitting contact guard assistance   Dynamic Sitting contact guard assistance   Static Standing maximal assistance   - Pt stood for 10 secs for 2 trials with MAX A; poor hip and knee extension identified with incr difficulty due to pain per pt report. Later in session, pt reports experiencing incr fear of falling limiting ability as well.    '      AM-PAC 6 CLICK MOBILITY  Turning over in bed (including adjusting bedclothes, sheets and blankets)?: 3  Sitting down on and standing up from a chair with arms (e.g., wheelchair, bedside commode, etc.): 2  Moving from lying on back to sitting on the side of the bed?: 3  Moving to and from a bed to a chair (including a wheelchair)?: 2  Need to walk in hospital room?: 1  Climbing 3-5 steps with a railing?: 1  Total Score: 12       Therapeutic Activities and Exercises:  THERAPEUTIC EXERCISE  Pt performed therapeutic exercise seated for 20 reps (B) LE   - strengthening: hip flexion, knee flexion, hip abd/add, LAQ      EDUCATION  Pt educated pt on incr OOB activity including sitting in bedside chair majority of day, utilizing bedside commode for toileting needs  Educated pt on being appropriate to transfer with nsg and PCT with 2 person assistance.  Updated white board with appropriate PT mobility information for medical team notification  Pt verbalized agreement.       Patient left supine with all lines intact and call button in reach..    GOALS:    Physical Therapy Goals        Problem: Physical Therapy Goal    Goal Priority Disciplines Outcome Goal Variances Interventions   Physical Therapy Goal     PT/OT, PT Ongoing (interventions implemented as appropriate)     Description:  Goals to be met by: 2018    Patient will increase functional independence with mobility by performin. Supine to sit with Moderate Assistance - MET  Revised: Supine to sit with CGA  2. Sit to supine with Moderate Assistance -  Met    Revised: Sit to supine  with CGA  3. Sit to stand transfer with Moderate Assistance using LRAD or no AD - not met  4. Bed to chair transfer with Moderate Assistance using LRAD or no AD - not met  5. Gait  x 20 feet with Moderate Assistance using LRAD or no AD - not met  6. Lower extremity exercise program x20 reps per handout, with supervision - not met                        Time Tracking:     PT Received On: 05/17/18  PT Start Time: 1150     PT Stop Time: 1215  PT Total Time (min): 25 min     Billable Minutes: Therapeutic Activity 15 and Therapeutic Exercise 10    Treatment Type: Treatment  PT/PTA: PT     PTA Visit Number: 1     Ana Stiles, PT, DPT  05/17/2018

## 2018-05-17 NOTE — PLAN OF CARE
Problem: Patient Care Overview  Goal: Plan of Care Review  Dx: Pneumonia   Hx: HTN, COPD and alcoholism, who is a transfer from [facility] ICU to [facility] on mechanical ventilation after being found unresponsive at home    4/5: Transferred to [facility] on mechanical ventilation. Dopamine gtt off.   4/6: Pt extubated  5/7: Echo 50-55%         RN  MAP>65    *Pt likes [employer] channel 3        Outcome: Ongoing (interventions implemented as appropriate)  VSS. Condom catheter is working great. Call light and personal items in reach. No changes over night. Will continue to monitor Pt.

## 2018-05-18 PROCEDURE — 25000003 PHARM REV CODE 250: Performed by: INTERNAL MEDICINE

## 2018-05-18 PROCEDURE — 94668 MNPJ CHEST WALL SBSQ: CPT

## 2018-05-18 PROCEDURE — 25000003 PHARM REV CODE 250: Performed by: STUDENT IN AN ORGANIZED HEALTH CARE EDUCATION/TRAINING PROGRAM

## 2018-05-18 PROCEDURE — 63600175 PHARM REV CODE 636 W HCPCS: Performed by: STUDENT IN AN ORGANIZED HEALTH CARE EDUCATION/TRAINING PROGRAM

## 2018-05-18 PROCEDURE — 20600001 HC STEP DOWN PRIVATE ROOM

## 2018-05-18 PROCEDURE — 94761 N-INVAS EAR/PLS OXIMETRY MLT: CPT

## 2018-05-18 PROCEDURE — 99232 SBSQ HOSP IP/OBS MODERATE 35: CPT | Mod: ,,, | Performed by: HOSPITALIST

## 2018-05-18 PROCEDURE — 92507 TX SP LANG VOICE COMM INDIV: CPT

## 2018-05-18 RX ADMIN — THIAMINE HCL TAB 100 MG 100 MG: 100 TAB at 08:05

## 2018-05-18 RX ADMIN — FOLIC ACID 1 MG: 1 TABLET ORAL at 08:05

## 2018-05-18 RX ADMIN — ENOXAPARIN SODIUM 40 MG: 100 INJECTION SUBCUTANEOUS at 06:05

## 2018-05-18 RX ADMIN — MOXIFLOXACIN HYDROCHLORIDE 400 MG: 400 TABLET, FILM COATED ORAL at 08:05

## 2018-05-18 NOTE — PT/OT/SLP PROGRESS
Speech Language Pathology Treatment    Patient Name:  Ryder Boo   MRN:  [id number]  Admitting Diagnosis: Aspiration pneumonia    Recommendations:                 General Recommendations:  Cognitive-linguistic therapy  Diet recommendations:  Dental Soft, Liquid Diet Level: Thin   Aspiration Precautions: Standard aspiration precautions   General Precautions: Standard, aspiration, fall  Communication strategies:  none    Subjective     Awake/alert  Pain/Comfort:  · Pain Rating 1: 0/10  · Pain Rating Post-Intervention 1: 0/10    Objective:     Has the patient been evaluated by SLP for swallowing?   Yes  Keep patient NPO? No   Current Respiratory Status: nasal cannula      Pt upright in bed upon entry. Complex commands followed with 100% accy ind'ly. Pt recalled all items eaten for breakfast with 100% accy provided increased time to answer. Word deduction task completed with 50% accy ind'ly increasing to 100% accy with mod cues and repetition. Occasional word finding difficulty noted during spontaneous conversation. Pt progressing with POC at this time.     Assessment:     Ryder Boo is a 63 y.o. male with an SLP diagnosis of Cognitive-Linguistic Impairment.     Goals:    SLP Goals        Problem: SLP Goal    Goal Priority Disciplines Outcome   SLP Goal     SLP Ongoing (interventions implemented as appropriate)   Description:  Goals to be met 5/15  1. Pt will participate in ongoing swallow eval   2. Pt will tolerate full liquids PO diet with no overt s/s of aspiration.   MET  3. Pt will tolerate dental soft diet/thin liquids without overt s/s of aspiration.    4. Pt will complete functional memory task with 80% accy and mod cues  5. Pt will follow complex commands with 70% accy and mod cues  6. Pt will complete problem solving tasks with 70% accy and min cues  7. Pt will complete word finding task with 80% accy and occasional cues.                     Plan:     · Patient to be seen:  5 x/week   · Plan of Care  reviewed with:  patient   · SLP Follow-Up:  Yes       Discharge recommendations:  rehabilitation facility       Time Tracking:     SLP Treatment Date:   05/18/18  Speech Start Time:  1006  Speech Stop Time:  1021     Speech Total Time (min):  15 min    Billable Minutes: Speech Therapy Individual 15    Marley Kimble CCC-SLP  05/18/2018

## 2018-05-18 NOTE — PROGRESS NOTES
[facility] Hospital Medicine  Progress Note    Patient Name: Ryder Boo  MRN: [id number]  Patient Class: IP- Inpatient   Admission Date: 5/6/2018  Length of Stay: 12 days  Attending Physician: Silver Mcfarland MD  Primary Care Provider: Swapnil Heart MD    [facility] Medicine Team: [facility] HOSP MED 4 Mariaelena Luna MD    Subjective:     Principal Problem:Aspiration pneumonia    HPI:  The patient is a 62 y/o M new to our system, with HTN, COPD and alcoholism, who is transfer from [facility] ICU to [facility] on mechanical ventilation.     The patient lives with his brother across the street from his sister. He was found unresponsive in a chair with small amount of blood in his mouth by his nephew and taken to the hospital by EMS on 5/3. Patient drinks every day and has been having frequent falls recently. Per sister he has quit smoking 15 years ago.  In the ED at OSH he was found to be hypoxic and hypotensive, with undetectable EtOH levels, leukocytosis (24K) and hyponatremia (119) in the labs. he received narcan x1, IV fluids, started on metronidazole and ceftriaxone (also x1 of zosyn, azithromycin, vancomycin, ceftaroline) for aspiration pneumonia, and was put on 15L of Oxygen with non-rebreather, which improved his mental status and BP, however on 4/5 upon weaning off of oxygen he developed hypoxic hypercapnic respiratory failure and subsequent acidosis (pH 7.10), therefore he was intubated and transferred to the ICU. Patient arrived to [facility] on dopamine gtt which per notes was started to maintain BP in the setting of hyponatremia.     Hospital Course:  5/6: upon arrival, patient was taken off of dopamine gtt and received 1.5L IV fluids to maintain MAPs. Continued on vanc/zosyn/azithromycin. Hyponatremia and ROHITH continued to improved with IVF. He was extubated in early afternoon and maintained normal O2 saturations on 3-4L oxygen on NC.   5/7: early morning he was noticed to be rattling while  breathing by the nurse. Upon examination he had increased work of breathing with worsening of bilateral rhonchi. Repeat CXR revealed interval worsening of the patchy infiltrations. Received x1 furosemide 60 mg IV and transitioned to high flow oxygen for possible development of ARDS, he is currently maintaining normal saturations, will keep on intubation watch.  5/8: several episodes of desaturation down to 70% overnight and early morning, that responded well to increasing oxygen to 100%, currently on 35L/50%, mildly drowsy due to precedex. Responded well to diuretics, net -2.8L. Will discontinue precedex and will have speech evaluate his swallowing.  5/9: Uneventful night off precedex. Remains confused, oriented to person and place. Oxygen requirements greatly improved 15L/50% with plan to wean to low-flow NC today. Diuresed -2.9L yesterday and discontinued lasix. Discontinued azithromycin and continued on zosyn. Failed speech eval by SLP and remains NPO  5/11-5/12 patient had NG tube placed, removed it and re-ordered on gentle tube feeds. On high o2 support at night as patient cannot have bipap due to NG.  05/14/2018 -Patient passed swallow eval to full liquids. He is now sating 100 % RA family updated awaiting rehab evaluation   05/17/2018 Patient was desating in the morning to 85-88 but he was alert oriented and no complaints gave him a breathing treatment   05/18/2018 awaiting placement     Interval History:  Patient with no acute events overnight. Awaiting placement   Review of Systems   Constitutional: Negative for activity change, chills, fatigue and fever.   HENT: Negative for drooling, hearing loss, trouble swallowing and voice change.    Eyes: Negative for pain and visual disturbance.   Respiratory: Negative for cough, shortness of breath and wheezing.    Cardiovascular: Negative for chest pain and palpitations.   Gastrointestinal: Negative for abdominal pain, nausea and vomiting.   Genitourinary: Negative  for difficulty urinating and flank pain.   Musculoskeletal: Positive for back pain and gait problem. Negative for arthralgias, myalgias and neck pain.   Skin: Negative for rash and wound.   Neurological: Negative for dizziness, weakness, numbness and headaches.   Psychiatric/Behavioral: Negative for agitation, confusion and hallucinations. The patient is not nervous/anxious.      Objective:     Vital Signs (Most Recent):  Temp: 98.7 °F (37.1 °C) (05/18/18 0806)  Pulse: 98 (05/18/18 0806)  Resp: 18 (05/18/18 0806)  BP: (!) 150/88 (05/18/18 0806)  SpO2: (!) 91 % (05/18/18 0806) Vital Signs (24h Range):  Temp:  [97.5 °F (36.4 °C)-98.8 °F (37.1 °C)] 98.7 °F (37.1 °C)  Pulse:  [] 98  Resp:  [16-18] 18  SpO2:  [88 %-100 %] 91 %  BP: (131-150)/(72-88) 150/88     Weight: 76.2 kg (167 lb 15.9 oz)  Body mass index is 27.11 kg/m².    Intake/Output Summary (Last 24 hours) at 05/18/18 0814  Last data filed at 05/17/18 2200   Gross per 24 hour   Intake                0 ml   Output              600 ml   Net             -600 ml      Physical Exam   Constitutional: He appears well-developed and well-nourished. No distress.   HENT:   Head: Normocephalic and atraumatic.   Right Ear: External ear normal.   Left Ear: External ear normal.   Nose: Nose normal.   Eyes: Right eye exhibits no discharge. Left eye exhibits no discharge. No scleral icterus.   Neck: Normal range of motion.   Cardiovascular: Normal rate, regular rhythm and intact distal pulses.    Pulmonary/Chest: Effort normal. No respiratory distress. He has no wheezes.   Abdominal: Soft. He exhibits no distension. There is no tenderness.   Musculoskeletal: Normal range of motion. He exhibits no edema or tenderness.   Neurological: He is alert. He exhibits normal muscle tone.   Skin: Skin is warm and dry. No rash noted.   Psychiatric: He has a normal mood and affect. His behavior is normal. His speech is not slurred. Cognition and memory are not impaired.   Vitals  reviewed.      Significant Labs: All pertinent labs within the past 24 hours have been reviewed.    Significant Imaging: I have reviewed and interpreted all pertinent imaging results/findings within the past 24 hours.    Assessment/Plan:      * Aspiration pneumonia    - At OSH had significant leukocytosis with WBCs to 38K.  - CXR with bilateral multifocal infiltration.  - Suspect aspiration secondary to AMS.  - on moxifloxacin        Hypernatremia    resolved          Moderate malnutrition    - now on liquid diet will continue to advance.           Hypomagnesemia    - repleting daily           Hypokalemia    - repleting daily           Anemia    - Hgb holding stable; some concern for Hgb drop as outpatient. FOBT performed and positive; no gross evidence of bleed.  - will stop getting CBC         Encephalopathy, metabolic    -resolved        Acute respiratory failure with hypoxia and hypercapnia    - Former tobacco abuse with history of COPD; concern for volume overload in ICU setting as well and underwent some diuresis. Respiratory status improving now; on 05/10 did have significant respiratory demand prompting CTA; negative for thromboembolic process.  - Continuing albuterol-ipratropium 2.5-0.5mg inhaled PRN   - now on NC, today was having trouble with desating in th 88% per AI, will give an inhaled treatment as we had them PRN   -continue  moxifloxacin  for total treamtent of 15 days : 5/20          Sepsis    - Secondary to aspiration PNA; improved.        Alcoholism /alcohol abuse    - History of alcohol abuse; continuing folic acid, thiamine daily.        ROHITH (acute kidney injury)    - Unclear baseline; Cr. holding stable.          VTE Risk Mitigation         Ordered     enoxaparin injection 40 mg  Daily      05/06/18 1016     Place sequential compression device  Until discontinued      05/06/18 0113     IP VTE LOW RISK PATIENT  Once      05/06/18 0113              Mariaelena Luna MD  Department of Hospital  Medicine   [facility]

## 2018-05-18 NOTE — PLAN OF CARE
Problem: SLP Goal  Goal: SLP Goal  Goals to be met 5/15  1. Pt will participate in ongoing swallow eval   2. Pt will tolerate full liquids PO diet with no overt s/s of aspiration.   MET  3. Pt will tolerate dental soft diet/thin liquids without overt s/s of aspiration.    4. Pt will complete functional memory task with 80% accy and mod cues  5. Pt will follow complex commands with 70% accy and mod cues  6. Pt will complete problem solving tasks with 70% accy and min cues  7. Pt will complete word finding task with 80% accy and occasional cues.    Continue POC at this time, pt progressing with goals.   Marley Kimble CCC-SLP  5/18/2018

## 2018-05-18 NOTE — PLAN OF CARE
Problem: Patient Care Overview  Goal: Plan of Care Review  Dx: Pneumonia   Hx: HTN, COPD and alcoholism, who is a transfer from [facility] ICU to [facility] on mechanical ventilation after being found unresponsive at home    4/5: Transferred to [facility] on mechanical ventilation. Dopamine gtt off.   4/6: Pt extubated  5/7: Echo 50-55%         RN  MAP>65    *Pt likes NCIS channel 3        Outcome: Ongoing (interventions implemented as appropriate)  POC reviewed with patient. AAOx4, VSS. 2L NC maintained for patient SpO2 <88% when awake and speaking. No evidence of distress, safety maintained. Hourly rounding performed, will continue to monitor.

## 2018-05-18 NOTE — NURSING
AAO x4, VSS, denies pain, refused to get OOB to chair today, working with PT/OT. Continue PO abx. Tolerating PO diet and liquids without difficulty. Incontinent of both bladder and bowel, +BM x3 today. Plan for d/c to SNF.

## 2018-05-18 NOTE — PLAN OF CARE
Spoke with Yuki in admissions at [facility], faxed requested info, Yuki also requesting CT or MRI of head, patient does not have one in records, Yuki stated they would like to rule out stroke because patient's family voiced that patient had difficulty swallowing and multiple falls at home, notified MARK, will follow.    1400 Informed that patient was transferred here from [facility], spoke with Fco in medical records at [facility] to see if they performed CT or MRI of head, notified by Fco that they had no record of a CT or MRI of the head being performed, notified MARK, will follow.

## 2018-05-19 LAB
ALBUMIN SERPL BCP-MCNC: 2.3 G/DL
ALP SERPL-CCNC: 40 U/L
ALT SERPL W/O P-5'-P-CCNC: 14 U/L
ANION GAP SERPL CALC-SCNC: 9 MMOL/L
AST SERPL-CCNC: 26 U/L
BILIRUB SERPL-MCNC: 0.3 MG/DL
BUN SERPL-MCNC: 19 MG/DL
CALCIUM SERPL-MCNC: 9 MG/DL
CHLORIDE SERPL-SCNC: 102 MMOL/L
CO2 SERPL-SCNC: 26 MMOL/L
CREAT SERPL-MCNC: 1 MG/DL
EST. GFR  (AFRICAN AMERICAN): >60 ML/MIN/1.73 M^2
EST. GFR  (NON AFRICAN AMERICAN): >60 ML/MIN/1.73 M^2
GLUCOSE SERPL-MCNC: 91 MG/DL
MAGNESIUM SERPL-MCNC: 1.3 MG/DL
POTASSIUM SERPL-SCNC: 3.6 MMOL/L
PROT SERPL-MCNC: 6.7 G/DL
SODIUM SERPL-SCNC: 137 MMOL/L

## 2018-05-19 PROCEDURE — 80053 COMPREHEN METABOLIC PANEL: CPT

## 2018-05-19 PROCEDURE — 25000003 PHARM REV CODE 250: Performed by: STUDENT IN AN ORGANIZED HEALTH CARE EDUCATION/TRAINING PROGRAM

## 2018-05-19 PROCEDURE — 99232 SBSQ HOSP IP/OBS MODERATE 35: CPT | Mod: ,,, | Performed by: HOSPITALIST

## 2018-05-19 PROCEDURE — 63600175 PHARM REV CODE 636 W HCPCS: Performed by: STUDENT IN AN ORGANIZED HEALTH CARE EDUCATION/TRAINING PROGRAM

## 2018-05-19 PROCEDURE — 25000003 PHARM REV CODE 250: Performed by: INTERNAL MEDICINE

## 2018-05-19 PROCEDURE — 99900035 HC TECH TIME PER 15 MIN (STAT)

## 2018-05-19 PROCEDURE — 97535 SELF CARE MNGMENT TRAINING: CPT

## 2018-05-19 PROCEDURE — 63600175 PHARM REV CODE 636 W HCPCS: Performed by: INTERNAL MEDICINE

## 2018-05-19 PROCEDURE — 94761 N-INVAS EAR/PLS OXIMETRY MLT: CPT

## 2018-05-19 PROCEDURE — 36415 COLL VENOUS BLD VENIPUNCTURE: CPT

## 2018-05-19 PROCEDURE — 94664 DEMO&/EVAL PT USE INHALER: CPT

## 2018-05-19 PROCEDURE — 97530 THERAPEUTIC ACTIVITIES: CPT

## 2018-05-19 PROCEDURE — 20600001 HC STEP DOWN PRIVATE ROOM

## 2018-05-19 PROCEDURE — 97110 THERAPEUTIC EXERCISES: CPT

## 2018-05-19 PROCEDURE — 27000646 HC AEROBIKA DEVICE

## 2018-05-19 PROCEDURE — 83735 ASSAY OF MAGNESIUM: CPT

## 2018-05-19 RX ORDER — MAGNESIUM SULFATE HEPTAHYDRATE 40 MG/ML
2 INJECTION, SOLUTION INTRAVENOUS ONCE
Status: COMPLETED | OUTPATIENT
Start: 2018-05-19 | End: 2018-05-19

## 2018-05-19 RX ADMIN — ENOXAPARIN SODIUM 40 MG: 100 INJECTION SUBCUTANEOUS at 05:05

## 2018-05-19 RX ADMIN — FOLIC ACID 1 MG: 1 TABLET ORAL at 08:05

## 2018-05-19 RX ADMIN — MOXIFLOXACIN HYDROCHLORIDE 400 MG: 400 TABLET, FILM COATED ORAL at 08:05

## 2018-05-19 RX ADMIN — MAGNESIUM SULFATE IN WATER 2 G: 40 INJECTION, SOLUTION INTRAVENOUS at 08:05

## 2018-05-19 RX ADMIN — THIAMINE HCL TAB 100 MG 100 MG: 100 TAB at 08:05

## 2018-05-19 NOTE — ASSESSMENT & PLAN NOTE
-resolved  - CT collected 5/18 with possible signs of mild NPH however, unclear whether patient's symptoms. Will suggest f/u with neuro as o/p if symptoms persist but not altered at this time and with improvement of function

## 2018-05-19 NOTE — PLAN OF CARE
Problem: Physical Therapy Goal  Goal: Physical Therapy Goal  Goals to be met by: 2018    Patient will increase functional independence with mobility by performin. Supine to sit with Moderate Assistance - MET  Revised: Supine to sit with CGA  2. Sit to supine with Moderate Assistance -  Met    Revised: Sit to supine with CGA  3. Sit to stand transfer with Moderate Assistance using LRAD or no AD -  Met   4. Bed to chair transfer with Moderate Assistance using LRAD or no AD - not met  5. Gait  x 20 feet with Moderate Assistance using LRAD or no AD - not met  6. Lower extremity exercise program x20 reps per handout, with supervision - not met       Patient goals remain appropriate.  Patient will continue to benefit from further PT services.  Kwaku Baltazar, PTA

## 2018-05-19 NOTE — PT/OT/SLP PROGRESS
Physical Therapy Treatment    Patient Name:  Ryder Boo   MRN:  [id number]    Recommendations:     Discharge Recommendations:  rehabilitation facility   Discharge Equipment Recommendations: bedside commode, walker, rolling, shower chair   Barriers to discharge: Inaccessible home and Decreased caregiver support    Assessment:     Ryder Boo is a 63 y.o. male admitted with a medical diagnosis of Aspiration pneumonia.  He presents with the following impairments/functional limitations:  weakness, impaired endurance, impaired self care skills, gait instability, impaired functional mobilty, impaired balance, decreased coordination, decreased lower extremity function, pain, decreased ROM, decreased safety awareness . Patient required less assistance with mobility and transfers.  Patient able to perform gait training with assistance.  Noted decrease strength and coordination with B LE's.  Patient progressing well toward goals.    Rehab Prognosis:  Fair; patient would benefit from acute skilled PT services to address these deficits and reach maximum level of function.      Recent Surgery: * No surgery found *      Plan:     During this hospitalization, patient to be seen 4 x/week to address the above listed problems via gait training, therapeutic activities, therapeutic exercises, neuromuscular re-education  · Plan of Care Expires:  06/06/18   Plan of Care Reviewed with: patient    Subjective     Communicated with NSG prior to session.  Patient found supine upon PT entry to room, agreeable to treatment.      Chief Complaint: B feet pain  Patient comments/goals: I think I had a BM  Pain/Comfort:  · Pain Rating 1: 4/10  · Location - Side 1: Bilateral  · Location 1: foot    Patients cultural, spiritual, Religion conflicts given the current situation: none    Objective:     Patient found with:  (no lines)     General Precautions: Standard, aspiration, fall   Orthopedic Precautions:N/A   Braces:       Functional  Mobility:  · Bed Mobility:     · Rolling Left:  supervision  · Rolling Right: supervision  · Supine to Sit: minimum assistance  · Transfers:     · Sit to Stand:  minimum assistance with rolling walker  · Bed to Chair: moderate assistance with  no AD  using  Stand Pivot  · Gait: amb 8 feet with RW min/mod assistance with chair to follow.  Noted min posterior lean last 3-4 feet.  Patient required min assistance for walker mgmnt.  · Balance: demonstrated fair sitting and poor standing balance      AM-PAC 6 CLICK MOBILITY  Turning over in bed (including adjusting bedclothes, sheets and blankets)?: 3  Sitting down on and standing up from a chair with arms (e.g., wheelchair, bedside commode, etc.): 3  Moving from lying on back to sitting on the side of the bed?: 3  Moving to and from a bed to a chair (including a wheelchair)?: 2  Need to walk in hospital room?: 2  Climbing 3-5 steps with a railing?: 1  Total Score: 14       Therapeutic Activities and Exercises:   Patient performed supine AP,GS,QS,hip ABD/ADD and HS;seated hip flexion and LAQ B LE's x15-20 reps with AAROM > min assistance  Patient performed pre gait activities with RW stepping FWD/BWD B LE's min assistance for balance.  Performed pericare with SBA > total assistance    Patient left up in chair with call button in reach and NSG present..    GOALS:    Physical Therapy Goals        Problem: Physical Therapy Goal    Goal Priority Disciplines Outcome Goal Variances Interventions   Physical Therapy Goal     PT/OT, PT Ongoing (interventions implemented as appropriate)     Description:  Goals to be met by: 2018    Patient will increase functional independence with mobility by performin. Supine to sit with Moderate Assistance - MET  Revised: Supine to sit with CGA  2. Sit to supine with Moderate Assistance -  Met    Revised: Sit to supine with CGA  3. Sit to stand transfer with Moderate Assistance using LRAD or no AD -  Met   4. Bed to chair  transfer with Moderate Assistance using LRAD or no AD - not met  5. Gait  x 20 feet with Moderate Assistance using LRAD or no AD - not met  6. Lower extremity exercise program x20 reps per handout, with supervision - not met                         Time Tracking:     PT Received On: 05/19/18  PT Start Time: 1055     PT Stop Time: 1125  PT Total Time (min): 30 min     Billable Minutes: Gait Training 8, Therapeutic Activity 12 and Therapeutic Exercise 10    Treatment Type: Treatment  PT/PTA: PTA     PTA Visit Number: 1     Kwaku Baltazar II, PTA  05/19/2018

## 2018-05-19 NOTE — ASSESSMENT & PLAN NOTE
- Former tobacco abuse with history of COPD; concern for volume overload in ICU setting as well and underwent some diuresis. Respiratory status improving now; on 05/10 did have significant respiratory demand prompting CTA; negative for thromboembolic process.  - Continuing albuterol-ipratropium 2.5-0.5mg inhaled PRN   - now on NC, today was having trouble with desating in th 88% per AI, will give an inhaled treatment as we had them PRN   -continue  moxifloxacin  for total treamtent of 15 days : end date 5/20

## 2018-05-19 NOTE — PLAN OF CARE
Problem: Occupational Therapy Goal  Goal: Occupational Therapy Goal  Goals to be met by: 5/22/18     Patient will increase functional independence with ADLs by performing:    Feeding with Supervision.  UE Dressing with Minimal Assistance. MET 5/16  LE Dressing with Moderate Assistance. MET 5/16  Grooming while standing with Contact Guard Assistance.  Toileting from bedside commode with Moderate Assistance for hygiene and clothing management.   Toilet transfer to bedside commode with Minimal Assistance .       Outcome: Ongoing (interventions implemented as appropriate)  Goals remain appropriate. Cont POC.    FRANCA Salazar  5/19/2018  Pager: [phone]

## 2018-05-19 NOTE — SUBJECTIVE & OBJECTIVE
Interval History: CT, requested by rehab to r/o stroke, shows mild signs of possible NPH. Will order f/u o/p neuro. Patient reports x3 BMs, will see if continues. Will complete 1 more day of moxi.     Review of Systems  Objective:     Vital Signs (Most Recent):  Temp: 98.2 °F (36.8 °C) (05/19/18 0726)  Pulse: 93 (05/19/18 0726)  Resp: 16 (05/19/18 0726)  BP: 137/78 (05/19/18 0726)  SpO2: (!) 91 % (05/19/18 0726) Vital Signs (24h Range):  Temp:  [97.9 °F (36.6 °C)-99 °F (37.2 °C)] 98.2 °F (36.8 °C)  Pulse:  [] 93  Resp:  [16-18] 16  SpO2:  [90 %-95 %] 91 %  BP: (113-153)/(70-87) 137/78     Weight: 76.2 kg (167 lb 15.9 oz)  Body mass index is 27.11 kg/m².    Intake/Output Summary (Last 24 hours) at 05/19/18 0858  Last data filed at 05/18/18 1700   Gross per 24 hour   Intake              800 ml   Output              700 ml   Net              100 ml      Physical Exam   Constitutional: He appears well-developed and well-nourished. No distress.   HENT:   Head: Normocephalic and atraumatic.   Right Ear: External ear normal.   Left Ear: External ear normal.   Nose: Nose normal.   Eyes: Right eye exhibits no discharge. Left eye exhibits no discharge. No scleral icterus.   Neck: Normal range of motion.   Cardiovascular: Normal rate, regular rhythm and intact distal pulses.    Pulmonary/Chest: Effort normal. No respiratory distress. He has no wheezes.   Abdominal: Soft. He exhibits no distension. There is no tenderness.   Musculoskeletal: Normal range of motion. He exhibits no edema or tenderness.   Neurological: He is alert. He exhibits normal muscle tone.   Skin: Skin is warm and dry. No rash noted.   Psychiatric: He has a normal mood and affect. His behavior is normal. His speech is not slurred. Cognition and memory are not impaired.   Vitals reviewed.      Significant Labs: All pertinent labs within the past 24 hours have been reviewed.    Significant Imaging: I have reviewed and interpreted all pertinent imaging  results/findings within the past 24 hours.     CT head 5/18    Moderate diffuse dilation of the ventricular system, possibly normal pressure hydrocephalus.

## 2018-05-19 NOTE — PLAN OF CARE
Problem: Patient Care Overview  Goal: Plan of Care Review  Dx: Pneumonia   Hx: HTN, COPD and alcoholism, who is a transfer from [facility] ICU to [facility] on mechanical ventilation after being found unresponsive at home    4/5: Transferred to [facility] on mechanical ventilation. Dopamine gtt off.   4/6: Pt extubated  5/7: Echo 50-55%         RN  MAP>65    *Pt likes [employer] channel 3        Outcome: Ongoing (interventions implemented as appropriate)  Patient got up to chair twice and walked a few feet with PT. Patient having several incontinent bowel movements per shift. IV magnesium given for level of 1.3.

## 2018-05-19 NOTE — PT/OT/SLP PROGRESS
Occupational Therapy   Treatment    Name: Ryder Boo  MRN: [id number]  Admitting Diagnosis:  Aspiration pneumonia       Recommendations:     Discharge Recommendations: rehabilitation facility  Discharge Equipment Recommendations:  bedside commode, walker, rolling, shower chair  Barriers to discharge:  Decreased caregiver support    Subjective     Communicated with: RN prior to session.  Pain/Comfort:  · Pain Rating 1:  (no complaints of pain)    Patients cultural, spiritual, Christianity conflicts given the current situation: none stated    Objective:     Patient found with:  (none)    General Precautions: Standard, aspiration, fall   Orthopedic Precautions:N/A   Braces: N/A     Occupational Performance:    Bed Mobility:    · Patient completed Supine to Sit with supervision     Functional Mobility/Transfers:  · Patient completed Sit <> Stand Transfer with Min/Mod A  with  no assistive device - during bathing, pt able to stand for 1 minute, then req seated rest break  · Patient completed Bed <> Chair Transfer using Stand Pivot technique with moderate assistance with no assistive device  · Functional Mobility: Mod A stand pivot to L, from EOB to recliner; difficulty advancing LEs, and required increased assistance as pt attempted to turn to the chair.    Activities of Daily Living:  · Grooming: Pt is able to perform oral care with Setup SBA, but sometimes requires Min A for cleanliness    · Bathing: moderate assistance pt had BM while in bed, but was able to sit EOB and clean all front areas; pt required assistance from PCT to clean backside, while OT assisted pt into standing and maintaining stance    Patient left up in chair with all lines intact, call button in reach and PCT present    AMPAC 6 Click:  AMPA Total Score: 21    Treatment & Education:  Pt ed re OT role and POC. Pt performed supine to sit with S. Pt performed grooming task with Min A. Pt performed sit to stand t/f with Min A and no AD to remove soiled  "linens. Pt sat EOB and performed bathing with Setup Mod A (pt cleaned front, PCT cleaned back). Pt able to stand again, but with Mod A and no AD, yet able to stand for 1 minute while PCT completed bathing task. Pt sat EOB to rest. Pt performed sit to stand t/f with Mod A and no AD, and required Mod A to pivot L to the chair. Pt ed re seated marches and hip extension (to be performed by scooting back in the chair). Pt ed re safety, and provided verbal teachback re use of call light and "call before you fall" protocol.  Education:    Assessment:     Ryder Boo is a 63 y.o. male with a medical diagnosis of Aspiration pneumonia.  He presents with the following performance deficits affecting function: weakness, impaired endurance, impaired self care skills, impaired functional mobilty, gait instability, impaired balance, decreased lower extremity function, decreased ROM, decreased safety awareness. Pt participates well and is motivated to regain functional independence. Pt demo improved bed mobility, but still requiring increased assistance for OOB activity. Pt also requiring assistance for seated self care tasks, but mostly due to his "messy" technique and requiring assistance with cleanliness. Pt tolerated standing with CGA for 1 minute, then required Min A to sit safely. Pt is progressing with therapy and would benefit from cont OT to improve self care skills, mobility, and safety.    Rehab Prognosis:  Good; patient would benefit from acute skilled OT services to address these deficits and reach maximum level of function.       Plan:     Patient to be seen 4 x/week to address the above listed problems via self-care/home management, therapeutic activities, therapeutic exercises  · Plan of Care Expires: 06/07/18  · Plan of Care Reviewed with: patient    This Plan of care has been discussed with the patient who was involved in its development and understands and is in agreement with the identified goals and treatment " plan    GOALS:    Occupational Therapy Goals        Problem: Occupational Therapy Goal    Goal Priority Disciplines Outcome Interventions   Occupational Therapy Goal     OT, PT/OT Ongoing (interventions implemented as appropriate)    Description:  Goals to be met by: 5/22/18     Patient will increase functional independence with ADLs by performing:    Feeding with Supervision.  UE Dressing with Minimal Assistance. MET 5/16  LE Dressing with Moderate Assistance. MET 5/16  Grooming while standing with Contact Guard Assistance.  Toileting from bedside commode with Moderate Assistance for hygiene and clothing management.   Toilet transfer to bedside commode with Minimal Assistance .                        Time Tracking:     OT Date of Treatment: 05/19/18  OT Start Time: 1440  OT Stop Time: 1533  OT Total Time (min): 53 min    Billable Minutes:Self Care/Home Management 45 minutes  Therapeutic Activity 8 minutes    FRANCA Salazar  5/19/2018  Pager: [phone]

## 2018-05-19 NOTE — PROGRESS NOTES
[facility] Hospital Medicine  Progress Note    Patient Name: Ryder Boo  MRN: [id number]  Patient Class: IP- Inpatient   Admission Date: 5/6/2018  Length of Stay: 13 days  Attending Physician: Silver Mcfarland MD  Primary Care Provider: Swapnil Heart MD    [facility] Medicine Team: [facility] HOSP MED 4 Jonh Estes MD    Subjective:     Principal Problem:Aspiration pneumonia    HPI:  The patient is a 64 y/o M new to our system, with HTN, COPD and alcoholism, who is transfer from [facility] ICU to [facility] on mechanical ventilation.     The patient lives with his brother across the street from his sister. He was found unresponsive in a chair with small amount of blood in his mouth by his nephew and taken to the hospital by EMS on 5/3. Patient drinks every day and has been having frequent falls recently. Per sister he has quit smoking 15 years ago.  In the ED at OSH he was found to be hypoxic and hypotensive, with undetectable EtOH levels, leukocytosis (24K) and hyponatremia (119) in the labs. he received narcan x1, IV fluids, started on metronidazole and ceftriaxone (also x1 of zosyn, azithromycin, vancomycin, ceftaroline) for aspiration pneumonia, and was put on 15L of Oxygen with non-rebreather, which improved his mental status and BP, however on 4/5 upon weaning off of oxygen he developed hypoxic hypercapnic respiratory failure and subsequent acidosis (pH 7.10), therefore he was intubated and transferred to the ICU. Patient arrived to [facility] on dopamine gtt which per notes was started to maintain BP in the setting of hyponatremia.     Hospital Course:  5/6: upon arrival, patient was taken off of dopamine gtt and received 1.5L IV fluids to maintain MAPs. Continued on vanc/zosyn/azithromycin. Hyponatremia and ROHITH continued to improved with IVF. He was extubated in early afternoon and maintained normal O2 saturations on 3-4L oxygen on NC.   5/7: early morning he was noticed to be rattling while  breathing by the nurse. Upon examination he had increased work of breathing with worsening of bilateral rhonchi. Repeat CXR revealed interval worsening of the patchy infiltrations. Received x1 furosemide 60 mg IV and transitioned to high flow oxygen for possible development of ARDS, he is currently maintaining normal saturations, will keep on intubation watch.  5/8: several episodes of desaturation down to 70% overnight and early morning, that responded well to increasing oxygen to 100%, currently on 35L/50%, mildly drowsy due to precedex. Responded well to diuretics, net -2.8L. Will discontinue precedex and will have speech evaluate his swallowing.  5/9: Uneventful night off precedex. Remains confused, oriented to person and place. Oxygen requirements greatly improved 15L/50% with plan to wean to low-flow NC today. Diuresed -2.9L yesterday and discontinued lasix. Discontinued azithromycin and continued on zosyn. Failed speech eval by SLP and remains NPO  5/11-5/12 patient had NG tube placed, removed it and re-ordered on gentle tube feeds. On high o2 support at night as patient cannot have bipap due to NG.  05/14/2018 -Patient passed swallow eval to full liquids. He is now sating 100 % RA family updated awaiting rehab evaluation   05/17/2018 Patient was desating in the morning to 85-88 but he was alert oriented and no complaints gave him a breathing treatment   05/18/2018 awaiting placement  05/19/2018 CT, requested by rehab to r/o stroke, shows mild signs of possible NPH. Will order f/u o/p neuro. Patient reports x3 BMs, will see if continues. Will complete 1 more day of moxi.     Interval History: CT, requested by rehab to r/o stroke, shows mild signs of possible NPH. Will order f/u o/p neuro. Patient reports x3 BMs, will see if continues. Will complete 1 more day of moxi.     Review of Systems  Objective:     Vital Signs (Most Recent):  Temp: 98.2 °F (36.8 °C) (05/19/18 0726)  Pulse: 93 (05/19/18 0726)  Resp: 16  (05/19/18 0726)  BP: 137/78 (05/19/18 0726)  SpO2: (!) 91 % (05/19/18 0726) Vital Signs (24h Range):  Temp:  [97.9 °F (36.6 °C)-99 °F (37.2 °C)] 98.2 °F (36.8 °C)  Pulse:  [] 93  Resp:  [16-18] 16  SpO2:  [90 %-95 %] 91 %  BP: (113-153)/(70-87) 137/78     Weight: 76.2 kg (167 lb 15.9 oz)  Body mass index is 27.11 kg/m².    Intake/Output Summary (Last 24 hours) at 05/19/18 0858  Last data filed at 05/18/18 1700   Gross per 24 hour   Intake              800 ml   Output              700 ml   Net              100 ml      Physical Exam   Constitutional: He appears well-developed and well-nourished. No distress.   HENT:   Head: Normocephalic and atraumatic.   Right Ear: External ear normal.   Left Ear: External ear normal.   Nose: Nose normal.   Eyes: Right eye exhibits no discharge. Left eye exhibits no discharge. No scleral icterus.   Neck: Normal range of motion.   Cardiovascular: Normal rate, regular rhythm and intact distal pulses.    Pulmonary/Chest: Effort normal. No respiratory distress. He has no wheezes.   Abdominal: Soft. He exhibits no distension. There is no tenderness.   Musculoskeletal: Normal range of motion. He exhibits no edema or tenderness.   Neurological: He is alert. He exhibits normal muscle tone.   Skin: Skin is warm and dry. No rash noted.   Psychiatric: He has a normal mood and affect. His behavior is normal. His speech is not slurred. Cognition and memory are not impaired.   Vitals reviewed.      Significant Labs: All pertinent labs within the past 24 hours have been reviewed.    Significant Imaging: I have reviewed and interpreted all pertinent imaging results/findings within the past 24 hours.     CT head 5/18    Moderate diffuse dilation of the ventricular system, possibly normal pressure hydrocephalus.    Assessment/Plan:      * Aspiration pneumonia    - At OSH had significant leukocytosis with WBCs to 38K.  - CXR with bilateral multifocal infiltration.  - Suspect aspiration secondary  to AMS.  - on moxifloxacin        Hypernatremia    resolved          Moderate malnutrition    - now on liquid diet will continue to advance.           Hypomagnesemia    - repleting daily           Hypokalemia    - repleting daily           Anemia    - Hgb holding stable; some concern for Hgb drop as outpatient. FOBT performed and positive; no gross evidence of bleed.  - will stop getting CBC         Encephalopathy, metabolic    -resolved  - CT collected 5/18 with possible signs of mild NPH however, unclear whether patient's symptoms. Will suggest f/u with neuro as o/p if symptoms persist but not altered at this time and with improvement of function        Fibrosis of lung              Acute respiratory failure with hypoxia and hypercapnia    - Former tobacco abuse with history of COPD; concern for volume overload in ICU setting as well and underwent some diuresis. Respiratory status improving now; on 05/10 did have significant respiratory demand prompting CTA; negative for thromboembolic process.  - Continuing albuterol-ipratropium 2.5-0.5mg inhaled PRN   - now on NC, today was having trouble with desating in th 88% per AI, will give an inhaled treatment as we had them PRN   -continue  moxifloxacin  for total treamtent of 15 days : end date 5/20          Sepsis    - Secondary to aspiration PNA; improved.        Alcoholism /alcohol abuse    - History of alcohol abuse; continuing folic acid, thiamine daily.        ROHITH (acute kidney injury)    - Unclear baseline; Cr. holding stable.          VTE Risk Mitigation         Ordered     enoxaparin injection 40 mg  Daily      05/06/18 1016     Place sequential compression device  Until discontinued      05/06/18 0113     IP VTE LOW RISK PATIENT  Once      05/06/18 0113              Jonh Estes MD  Department of Hospital Medicine   [facility]

## 2018-05-19 NOTE — PLAN OF CARE
Problem: Patient Care Overview  Goal: Plan of Care Review  Dx: Pneumonia   Hx: HTN, COPD and alcoholism, who is a transfer from [facility] ICU to [facility] [place] after being found unresponsive at home    4/5: Transferred [place] [facility] [place]. Dopamine gtt off.   4/6: Pt extubated  5/7: Echo 50-55%         RN  MAP>65    *Pt likes [employer]IS channel 3        Outcome: Ongoing (interventions implemented as appropriate)  Patient incontinent of urine and stool multiple times this shift. Patient voiced frustration at increased output. Patient awaiting D/C to SNF.

## 2018-05-20 PROCEDURE — 94664 DEMO&/EVAL PT USE INHALER: CPT

## 2018-05-20 PROCEDURE — 25000003 PHARM REV CODE 250: Performed by: INTERNAL MEDICINE

## 2018-05-20 PROCEDURE — 27000646 HC AEROBIKA DEVICE

## 2018-05-20 PROCEDURE — 20600001 HC STEP DOWN PRIVATE ROOM

## 2018-05-20 PROCEDURE — 99232 SBSQ HOSP IP/OBS MODERATE 35: CPT | Mod: ,,, | Performed by: HOSPITALIST

## 2018-05-20 PROCEDURE — 63600175 PHARM REV CODE 636 W HCPCS: Performed by: STUDENT IN AN ORGANIZED HEALTH CARE EDUCATION/TRAINING PROGRAM

## 2018-05-20 RX ADMIN — FOLIC ACID 1 MG: 1 TABLET ORAL at 08:05

## 2018-05-20 RX ADMIN — ENOXAPARIN SODIUM 40 MG: 100 INJECTION SUBCUTANEOUS at 05:05

## 2018-05-20 RX ADMIN — THIAMINE HCL TAB 100 MG 100 MG: 100 TAB at 08:05

## 2018-05-20 NOTE — PROGRESS NOTES
[facility] Hospital Medicine  Progress Note    Patient Name: Ryder Boo  MRN: [id number]  Patient Class: IP- Inpatient   Admission Date: 5/6/2018  Length of Stay: 14 days  Attending Physician: Silver Mcfarland MD  Primary Care Provider: Swapnil Heart MD    [facility] Medicine Team: [facility] 4 Mariaelena Luna MD    Subjective:     Principal Problem:Aspiration pneumonia    HPI:  The patient is a 62 y/o M new to our system, with HTN, COPD and alcoholism, who is transfer from [facility] ICU [place] [facility] [place].     The patient lives with his brother across the street from his sister. He was found unresponsive in a chair with small amount of blood in his mouth by his nephew and taken to the hospital by EMS on 5/3. Patient drinks every day and has been having frequent falls recently. Per sister he has quit smoking 15 years ago.  In the ED at OSH he was found to be hypoxic and hypotensive, with undetectable EtOH levels, leukocytosis (24K) and hyponatremia (119) in the labs. he received narcan x1, IV fluids, started on metronidazole and ceftriaxone (also x1 of zosyn, azithromycin, vancomycin, ceftaroline) for aspiration pneumonia, and was put on 15L of Oxygen with non-rebreather, which improved his mental status and BP, however on 4/5 upon weaning off of oxygen he developed hypoxic hypercapnic respiratory failure and subsequent acidosis (pH 7.10), therefore he was intubated and transferred to the ICU. Patient arrived [place] [facility] [place] gtt which per notes was started to maintain BP in the setting of hyponatremia.     Hospital Course:  5/6: upon arrival, patient was taken off of dopamine gtt and received 1.5L IV fluids to maintain MAPs. Continued on vanc/zosyn/azithromycin. Hyponatremia and ROHITH continued to improved with IVF. He was extubated in early afternoon and maintained normal O2 saturations on 3-4L oxygen on NC.   5/7: early morning he was noticed to be rattling while  breathing by the nurse. Upon examination he had increased work of breathing with worsening of bilateral rhonchi. Repeat CXR revealed interval worsening of the patchy infiltrations. Received x1 furosemide 60 mg IV and transitioned to high flow oxygen for possible development of ARDS, he is currently maintaining normal saturations, will keep on intubation watch.  5/8: several episodes of desaturation down to 70% overnight and early morning, that responded well to increasing oxygen to 100%, currently on 35L/50%, mildly drowsy due to precedex. Responded well to diuretics, net -2.8L. Will discontinue precedex and will have speech evaluate his swallowing.  5/9: Uneventful night off precedex. Remains confused, oriented to person and place. Oxygen requirements greatly improved 15L/50% with plan to wean to low-flow NC today. Diuresed -2.9L yesterday and discontinued lasix. Discontinued azithromycin and continued on zosyn. Failed speech eval by SLP and remains NPO  5/11-5/12 patient had NG tube placed, removed it and re-ordered on gentle tube feeds. On high o2 support at night as patient cannot have bipap due to NG.  05/14/2018 -Patient passed swallow eval to full liquids. He is now sating 100 % RA family updated awaiting rehab evaluation   05/17/2018 Patient was desating in the morning to 85-88 but he was alert oriented and no complaints gave him a breathing treatment   05/18/2018 awaiting placement  05/19/2018 CT, requested by rehab to r/o stroke, shows mild signs of possible NPH. Will order f/u o/p neuro. Patient reports x3 BMs, will see if continues. Will complete 1 more day of moxi.   05/20/2018 no acute events overnight patient on last day of moxi    Interval History: Patient with no acute events overnight, Patient resting comfortably. One day of antibiotics left and still awaiting placement     Review of Systems   Constitutional: Negative for activity change, chills, fatigue and fever.   HENT: Negative for drooling,  hearing loss, trouble swallowing and voice change.    Eyes: Negative for pain and visual disturbance.   Respiratory: Negative for cough, shortness of breath and wheezing.    Cardiovascular: Negative for chest pain and palpitations.   Gastrointestinal: Negative for abdominal pain, nausea and vomiting.   Genitourinary: Negative for difficulty urinating and flank pain.   Musculoskeletal: Positive for back pain and gait problem. Negative for arthralgias, myalgias and neck pain.   Skin: Negative for rash and wound.   Neurological: Negative for dizziness, weakness, numbness and headaches.   Psychiatric/Behavioral: Negative for agitation, confusion and hallucinations. The patient is not nervous/anxious.      Objective:     Vital Signs (Most Recent):  Temp: 99.2 °F (37.3 °C) (05/20/18 0714)  Pulse: 97 (05/20/18 0714)  Resp: 16 (05/20/18 0714)  BP: 123/62 (05/20/18 0714)  SpO2: 97 % (05/20/18 0714) Vital Signs (24h Range):  Temp:  [98 °F (36.7 °C)-99.2 °F (37.3 °C)] 99.2 °F (37.3 °C)  Pulse:  [] 97  Resp:  [16-20] 16  SpO2:  [90 %-97 %] 97 %  BP: (106-142)/(51-88) 123/62     Weight: 76.2 kg (167 lb 15.9 oz)  Body mass index is 27.11 kg/m².    Intake/Output Summary (Last 24 hours) at 05/20/18 0858  Last data filed at 05/20/18 0853   Gross per 24 hour   Intake              240 ml   Output              200 ml   Net               40 ml      Physical Exam   Constitutional: He appears well-developed and well-nourished. No distress.   HENT:   Head: Normocephalic and atraumatic.   Right Ear: External ear normal.   Left Ear: External ear normal.   Nose: Nose normal.   Eyes: Right eye exhibits no discharge. Left eye exhibits no discharge. No scleral icterus.   Neck: Normal range of motion.   Cardiovascular: Normal rate, regular rhythm and intact distal pulses.    Pulmonary/Chest: Effort normal. No respiratory distress. He has no wheezes.   Abdominal: Soft. He exhibits no distension. There is no tenderness.   Musculoskeletal:  Normal range of motion. He exhibits no edema or tenderness.   Neurological: He is alert. He exhibits normal muscle tone.   Skin: Skin is warm and dry. No rash noted.   Psychiatric: He has a normal mood and affect. His behavior is normal. His speech is not slurred. Cognition and memory are not impaired.   Vitals reviewed.      Significant Labs:   CBC: No results for input(s): WBC, HGB, HCT, PLT in the last 48 hours.  CMP:   Recent Labs  Lab 05/19/18  0511      K 3.6      CO2 26   GLU 91   BUN 19   CREATININE 1.0   CALCIUM 9.0   PROT 6.7   ALBUMIN 2.3*   BILITOT 0.3   ALKPHOS 40*   AST 26   ALT 14   ANIONGAP 9   EGFRNONAA >60.0     All pertinent labs within the past 24 hours have been reviewed.    Significant Imaging: I have reviewed and interpreted all pertinent imaging results/findings within the past 24 hours.    Assessment/Plan:      * Aspiration pneumonia    - At OSH had significant leukocytosis with WBCs to 38K.  - CXR with bilateral multifocal infiltration.  - Suspect aspiration secondary to AMS.  - on moxifloxacin for one more day         Hypernatremia    resolved          Moderate malnutrition    - continue boost and full diet           Hypomagnesemia    - repleting daily           Hypokalemia    - repleting daily           Anemia    - Hgb holding stable; some concern for Hgb drop as outpatient. FOBT performed and positive; no gross evidence of bleed.  - will stop getting CBC         Encephalopathy, metabolic    -resolved  - CT collected 5/18 with possible signs of mild NPH however, unclear whether patient's symptoms. Will suggest f/u with neuro as o/p if symptoms persist but not altered at this time and with improvement of function        Fibrosis of lung              Acute respiratory failure with hypoxia and hypercapnia    - Former tobacco abuse with history of COPD; concern for volume overload in ICU setting as well and underwent some diuresis. Respiratory status improving now; on 05/10 did  have significant respiratory demand prompting CTA; negative for thromboembolic process.  - Continuing albuterol-ipratropium 2.5-0.5mg inhaled PRN   - now on NC, today was having trouble with desating in th 88% per AI, will give an inhaled treatment as we had them PRN   -Last day of moxi today           Sepsis    - Secondary to aspiration PNA; improved.        Alcoholism /alcohol abuse    - History of alcohol abuse; continuing folic acid, thiamine daily.        ROHITH (acute kidney injury)    - Unclear baseline; Cr. holding stable.          VTE Risk Mitigation         Ordered     enoxaparin injection 40 mg  Daily      05/06/18 1016     Place sequential compression device  Until discontinued      05/06/18 0113     IP VTE LOW RISK PATIENT  Once      05/06/18 0113              Mariaelena Luna MD  Department of Hospital Medicine   [facility]

## 2018-05-20 NOTE — PLAN OF CARE
Problem: Patient Care Overview  Goal: Plan of Care Review  Dx: Pneumonia   Hx: HTN, COPD and alcoholism, who is a transfer from [facility] ICU [place] [facility] [place] after being found unresponsive at home    4/5: Transferred [place] [facility] [place]. Dopamine gtt off.   4/6: Pt extubated  5/7: Echo 50-55%         RN  MAP>65    *Pt likes [employer] channel 3        Outcome: Ongoing (interventions implemented as appropriate)  Patient's incontinence of bowel has slowed down, but not stopped. Patient is able to use urinal while awake.

## 2018-05-20 NOTE — SUBJECTIVE & OBJECTIVE
Interval History: Patient with no acute events overnight, Patient resting comfortably. One day of antibiotics left and still awaiting placement     Review of Systems   Constitutional: Negative for activity change, chills, fatigue and fever.   HENT: Negative for drooling, hearing loss, trouble swallowing and voice change.    Eyes: Negative for pain and visual disturbance.   Respiratory: Negative for cough, shortness of breath and wheezing.    Cardiovascular: Negative for chest pain and palpitations.   Gastrointestinal: Negative for abdominal pain, nausea and vomiting.   Genitourinary: Negative for difficulty urinating and flank pain.   Musculoskeletal: Positive for back pain and gait problem. Negative for arthralgias, myalgias and neck pain.   Skin: Negative for rash and wound.   Neurological: Negative for dizziness, weakness, numbness and headaches.   Psychiatric/Behavioral: Negative for agitation, confusion and hallucinations. The patient is not nervous/anxious.      Objective:     Vital Signs (Most Recent):  Temp: 99.2 °F (37.3 °C) (05/20/18 0714)  Pulse: 97 (05/20/18 0714)  Resp: 16 (05/20/18 0714)  BP: 123/62 (05/20/18 0714)  SpO2: 97 % (05/20/18 0714) Vital Signs (24h Range):  Temp:  [98 °F (36.7 °C)-99.2 °F (37.3 °C)] 99.2 °F (37.3 °C)  Pulse:  [] 97  Resp:  [16-20] 16  SpO2:  [90 %-97 %] 97 %  BP: (106-142)/(51-88) 123/62     Weight: 76.2 kg (167 lb 15.9 oz)  Body mass index is 27.11 kg/m².    Intake/Output Summary (Last 24 hours) at 05/20/18 0858  Last data filed at 05/20/18 0853   Gross per 24 hour   Intake              240 ml   Output              200 ml   Net               40 ml      Physical Exam   Constitutional: He appears well-developed and well-nourished. No distress.   HENT:   Head: Normocephalic and atraumatic.   Right Ear: External ear normal.   Left Ear: External ear normal.   Nose: Nose normal.   Eyes: Right eye exhibits no discharge. Left eye exhibits no discharge. No scleral icterus.    Neck: Normal range of motion.   Cardiovascular: Normal rate, regular rhythm and intact distal pulses.    Pulmonary/Chest: Effort normal. No respiratory distress. He has no wheezes.   Abdominal: Soft. He exhibits no distension. There is no tenderness.   Musculoskeletal: Normal range of motion. He exhibits no edema or tenderness.   Neurological: He is alert. He exhibits normal muscle tone.   Skin: Skin is warm and dry. No rash noted.   Psychiatric: He has a normal mood and affect. His behavior is normal. His speech is not slurred. Cognition and memory are not impaired.   Vitals reviewed.      Significant Labs:   CBC: No results for input(s): WBC, HGB, HCT, PLT in the last 48 hours.  CMP:   Recent Labs  Lab 05/19/18  0511      K 3.6      CO2 26   GLU 91   BUN 19   CREATININE 1.0   CALCIUM 9.0   PROT 6.7   ALBUMIN 2.3*   BILITOT 0.3   ALKPHOS 40*   AST 26   ALT 14   ANIONGAP 9   EGFRNONAA >60.0     All pertinent labs within the past 24 hours have been reviewed.    Significant Imaging: I have reviewed and interpreted all pertinent imaging results/findings within the past 24 hours.

## 2018-05-20 NOTE — ASSESSMENT & PLAN NOTE
- Former tobacco abuse with history of COPD; concern for volume overload in ICU setting as well and underwent some diuresis. Respiratory status improving now; on 05/10 did have significant respiratory demand prompting CTA; negative for thromboembolic process.  - Continuing albuterol-ipratropium 2.5-0.5mg inhaled PRN   - now on NC, today was having trouble with desating in th 88% per AI, will give an inhaled treatment as we had them PRN   -Last day of moxi today

## 2018-05-20 NOTE — PLAN OF CARE
Problem: Patient Care Overview  Goal: Plan of Care Review  Dx: Pneumonia   Hx: HTN, COPD and alcoholism, who is a transfer from [facility] ICU to [facility] on mechanical ventilation after being found unresponsive at home    4/5: Transferred to [facility] on mechanical ventilation. Dopamine gtt off.   4/6: Pt extubated  5/7: Echo 50-55%         RN  MAP>65    *Pt likes [employer] channel 3        No episodes of incontinence overnight. No acute events overnight. Patient awaiting approval for D/C to SNF.

## 2018-05-20 NOTE — ASSESSMENT & PLAN NOTE
- At OSH had significant leukocytosis with WBCs to 38K.  - CXR with bilateral multifocal infiltration.  - Suspect aspiration secondary to AMS.  - on moxifloxacin for one more day

## 2018-05-21 PROBLEM — E87.0 HYPERNATREMIA: Status: RESOLVED | Noted: 2018-05-13 | Resolved: 2018-05-21

## 2018-05-21 PROBLEM — G93.41 ENCEPHALOPATHY, METABOLIC: Status: RESOLVED | Noted: 2018-05-10 | Resolved: 2018-05-21

## 2018-05-21 PROBLEM — A41.9 SEPSIS: Status: RESOLVED | Noted: 2018-05-06 | Resolved: 2018-05-21

## 2018-05-21 PROBLEM — N17.9 AKI (ACUTE KIDNEY INJURY): Status: RESOLVED | Noted: 2018-05-06 | Resolved: 2018-05-21

## 2018-05-21 LAB
ANION GAP SERPL CALC-SCNC: 7 MMOL/L
BUN SERPL-MCNC: 17 MG/DL
CALCIUM SERPL-MCNC: 8.9 MG/DL
CHLORIDE SERPL-SCNC: 103 MMOL/L
CO2 SERPL-SCNC: 26 MMOL/L
CREAT SERPL-MCNC: 0.8 MG/DL
EST. GFR  (AFRICAN AMERICAN): >60 ML/MIN/1.73 M^2
EST. GFR  (NON AFRICAN AMERICAN): >60 ML/MIN/1.73 M^2
GLUCOSE SERPL-MCNC: 89 MG/DL
MAGNESIUM SERPL-MCNC: 1.5 MG/DL
POTASSIUM SERPL-SCNC: 3.3 MMOL/L
SODIUM SERPL-SCNC: 136 MMOL/L

## 2018-05-21 PROCEDURE — 63600175 PHARM REV CODE 636 W HCPCS: Performed by: INTERNAL MEDICINE

## 2018-05-21 PROCEDURE — 94668 MNPJ CHEST WALL SBSQ: CPT

## 2018-05-21 PROCEDURE — 25000003 PHARM REV CODE 250: Performed by: INTERNAL MEDICINE

## 2018-05-21 PROCEDURE — 83735 ASSAY OF MAGNESIUM: CPT

## 2018-05-21 PROCEDURE — 97530 THERAPEUTIC ACTIVITIES: CPT

## 2018-05-21 PROCEDURE — 36415 COLL VENOUS BLD VENIPUNCTURE: CPT

## 2018-05-21 PROCEDURE — 63600175 PHARM REV CODE 636 W HCPCS: Performed by: STUDENT IN AN ORGANIZED HEALTH CARE EDUCATION/TRAINING PROGRAM

## 2018-05-21 PROCEDURE — 92507 TX SP LANG VOICE COMM INDIV: CPT

## 2018-05-21 PROCEDURE — 99232 SBSQ HOSP IP/OBS MODERATE 35: CPT | Mod: ,,, | Performed by: HOSPITALIST

## 2018-05-21 PROCEDURE — 99900026 HC AIRWAY MAINTENANCE (STAT)

## 2018-05-21 PROCEDURE — 92526 ORAL FUNCTION THERAPY: CPT

## 2018-05-21 PROCEDURE — 20600001 HC STEP DOWN PRIVATE ROOM

## 2018-05-21 PROCEDURE — 80048 BASIC METABOLIC PNL TOTAL CA: CPT

## 2018-05-21 RX ORDER — POTASSIUM CHLORIDE 20 MEQ/15ML
40 SOLUTION ORAL ONCE
Status: COMPLETED | OUTPATIENT
Start: 2018-05-21 | End: 2018-05-21

## 2018-05-21 RX ORDER — MAGNESIUM SULFATE HEPTAHYDRATE 40 MG/ML
2 INJECTION, SOLUTION INTRAVENOUS ONCE
Status: COMPLETED | OUTPATIENT
Start: 2018-05-21 | End: 2018-05-21

## 2018-05-21 RX ADMIN — ENOXAPARIN SODIUM 40 MG: 100 INJECTION SUBCUTANEOUS at 04:05

## 2018-05-21 RX ADMIN — THIAMINE HCL TAB 100 MG 100 MG: 100 TAB at 11:05

## 2018-05-21 RX ADMIN — MAGNESIUM SULFATE IN WATER 2 G: 40 INJECTION, SOLUTION INTRAVENOUS at 11:05

## 2018-05-21 RX ADMIN — FOLIC ACID 1 MG: 1 TABLET ORAL at 11:05

## 2018-05-21 RX ADMIN — POTASSIUM CHLORIDE 40 MEQ: 20 SOLUTION ORAL at 11:05

## 2018-05-21 NOTE — PLAN OF CARE
Problem: Physical Therapy Goal  Goal: Physical Therapy Goal  Goals to be met by: 2018    Patient will increase functional independence with mobility by performin. Supine to sit with Moderate Assistance - MET  Revised: Supine to sit with CGA  2. Sit to supine with Moderate Assistance -  Met    Revised: Sit to supine with CGA  3. Sit to stand transfer with Moderate Assistance using LRAD or no AD -  Met   4. Bed to chair transfer with Moderate Assistance using LRAD or no AD - not met  5. Gait  x 20 feet with Moderate Assistance using LRAD or no AD - not met  6. Lower extremity exercise program x20 reps per handout, with supervision - not met        Goals remain appropriate. Continue with PT POC as indicated.

## 2018-05-21 NOTE — PLAN OF CARE
Problem: Patient Care Overview  Goal: Plan of Care Review  Dx: Pneumonia   Hx: HTN, COPD and alcoholism, who is a [place] [facility] [place] [facility] [place] being found unresponsive at home    4/5: [place] [facility] [place]. Dopamine gtt off.   4/6: Pt extubated  5/7: Echo 50-55%         RN  MAP>65    *Pt likes [employer] channel 3        Outcome: Ongoing (interventions implemented as appropriate)  Pt had uneventful night. VSS. No S/Sx of distress. RN reiterated to the Pt that if he can sense he is going to have a bm he should call and have it in a bedpan and not just be incontinent as Pt is actually able to mobilize in bed quite well. Definite self-care deficit. Nor the motivation to do better despite the dermatitis from it. Will continue to monitor.

## 2018-05-21 NOTE — PLAN OF CARE
Problem: SLP Goal  Goal: SLP Goal  Goals to be met 5/15  1. Pt will participate in ongoing swallow eval   2. Pt will tolerate full liquids PO diet with no overt s/s of aspiration.   MET  3. Pt will tolerate dental soft diet/thin liquids without overt s/s of aspiration.    4. Pt will complete functional memory task with 80% accy and mod cues  5. Pt will follow complex commands with 70% accy and mod cues  6. Pt will complete problem solving tasks with 70% accy and min cues  7. Pt will complete word finding task with 80% accy and occasional cues.    Outcome: Ongoing (interventions implemented as appropriate)  Pt participated well w/ tx tasks.  Cont ST per POC.    Andra Jones CCC-SLP  5/21/2018

## 2018-05-21 NOTE — PLAN OF CARE
Problem: Patient Care Overview  Goal: Plan of Care Review  Dx: Pneumonia   Hx: HTN, COPD and alcoholism, who is a [place] [facility] [place] [facility] [place] being found un[place] at home    4/5: [place] [facility] [place]. Dopamine gtt off.   4/6: Pt extubated  5/7: Echo 50-55%         RN  MAP>65    *Pt likes [employer] channel 3        Outcome: Ongoing (interventions implemented as appropriate)  Pt AOx3 and verbally responsive to care. No c/o pain or discomfort. VSS. Awaiting [facility] placement. Cont to monitor.

## 2018-05-21 NOTE — PT/OT/SLP PROGRESS
"Speech Language Pathology Treatment    Patient Name:  Ryder Boo   MRN:  [id number]  Admitting Diagnosis: Aspiration pneumonia    Recommendations:                 General Recommendations:  Dysphagia therapy, Speech/language therapy and Cognitive-linguistic therapy  Diet recommendations:  Dental Soft, Liquid Diet Level: Thin   Aspiration Precautions: 1 bite/sip at a time, Alternating bites/sips, HOB to 90 degrees, Meds whole 1 at a time, Monitor for s/s of aspiration, Small bites/sips and Standard aspiration precautions   General Precautions: Standard, aspiration, fall  Communication strategies:  provide increased time to answer and go to room if call light pushed    Subjective     "I got a lot on my mind."  Pt stated when asked about memory and previous level of function  Patient goals: to get well     Pain/Comfort:  · Pain Rating 1: 0/10  · Pain Rating Post-Intervention 1: 0/10    Objective:     Has the patient been evaluated by SLP for swallowing?   Yes  Keep patient NPO? No   Current Respiratory Status: room air      Pt was awake and alert in NAD.  He was agreeable to tx session.  He followed complex directives w/ 80% acc indep up to 90% cued.  He completed problem-solving tasks w/ 100% acc indep.  He completed word finding tasks w/ recall of 15 and 9 targets in two separate 60 sec timed trials given mod cues.  Pt was unable to recall am meal despite cues.  He indicated family members assisted w/ his finances but that his memory was functional prior to admission.      He was offered and accepted po trials of thin liquids by straw.  He declined trials of soft solids.  He tolerated all thin liquid trials w/ no overt signs of airway compromise.  He was provided education re: SLP role, progress towards goals, aspiration precautions and SLP POC.  He indicated fair understanding.    Assessment:     Ryder Boo is a 63 y.o. male with an SLP diagnosis of Dysphagia and Cognitive-Linguistic Impairment.  He presents with " steady improvement towards goals.    Goals:    SLP Goals        Problem: SLP Goal    Goal Priority Disciplines Outcome   SLP Goal     SLP Ongoing (interventions implemented as appropriate)   Description:  Goals to be met 5/15  1. Pt will participate in ongoing swallow eval   2. Pt will tolerate full liquids PO diet with no overt s/s of aspiration.   MET  3. Pt will tolerate dental soft diet/thin liquids without overt s/s of aspiration.    4. Pt will complete functional memory task with 80% accy and mod cues  5. Pt will follow complex commands with 70% accy and mod cues  6. Pt will complete problem solving tasks with 70% accy and min cues  7. Pt will complete word finding task with 80% accy and occasional cues.                     Plan:     · Patient to be seen:  5 x/week   · Plan of Care expires:     · Plan of Care reviewed with:  patient   · SLP Follow-Up:  Yes       Discharge recommendations:  rehabilitation facility   Barriers to Discharge:  Decreased Care Giver Support    Time Tracking:     SLP Treatment Date:   05/21/18  Speech Start Time:  1337  Speech Stop Time:  1357     Speech Total Time (min):  20 min    Billable Minutes: Speech Therapy Individual 10 and Treatment Swallowing Dysfunction 10    Andra Jones CCC-SLP  05/21/2018

## 2018-05-21 NOTE — PLAN OF CARE
3:30 PM  SW faxed over info requested via .  Spoke w/Ynes [phone], she reported she received info requested and no further needs at this time.        Shasta Murray, DENISE  [facility]

## 2018-05-21 NOTE — NURSING
Orthostatic vitals complete. Pt denies dizziness or blurred vision with any change of position. Lying BP: 126/80. Sitting BP: 130/71. Standing with assist BP: 126/70. Of note, pt was significantly tachycardic with position changes. HR per position were as follows: Lyin. Sitting 117. Standing 124. Pt denies SOB with any change of position.

## 2018-05-21 NOTE — SUBJECTIVE & OBJECTIVE
Interval History: Patient with no acute events overnight, Patient resting comfortably.  still awaiting placement     Review of Systems   Constitutional: Negative for activity change, chills, fatigue and fever.   HENT: Negative for drooling, hearing loss, trouble swallowing and voice change.    Eyes: Negative for pain and visual disturbance.   Respiratory: Negative for cough, shortness of breath and wheezing.    Cardiovascular: Negative for chest pain and palpitations.   Gastrointestinal: Negative for abdominal pain, nausea and vomiting.   Genitourinary: Negative for difficulty urinating and flank pain.   Musculoskeletal: Positive for back pain and gait problem. Negative for arthralgias, myalgias and neck pain.   Skin: Negative for rash and wound.   Neurological: Negative for dizziness, weakness, numbness and headaches.   Psychiatric/Behavioral: Negative for agitation, confusion and hallucinations. The patient is not nervous/anxious.      Objective:     Vital Signs (Most Recent):  Temp: 98.9 °F (37.2 °C) (05/21/18 1142)  Pulse: (!) 124 (05/21/18 1231)  Resp: 18 (05/21/18 1142)  BP: 126/70 (05/21/18 1231)  SpO2: (!) 91 % (05/21/18 1142) Vital Signs (24h Range):  Temp:  [98.6 °F (37 °C)-99 °F (37.2 °C)] 98.9 °F (37.2 °C)  Pulse:  [] 124  Resp:  [16-18] 18  SpO2:  [91 %-95 %] 91 %  BP: (116-141)/(59-87) 126/70     Weight: 76.2 kg (167 lb 15.9 oz)  Body mass index is 27.11 kg/m².    Intake/Output Summary (Last 24 hours) at 05/21/18 1345  Last data filed at 05/21/18 0600   Gross per 24 hour   Intake                0 ml   Output             2050 ml   Net            -2050 ml      Physical Exam   Constitutional: He appears well-developed and well-nourished. No distress.   HENT:   Head: Normocephalic and atraumatic.   Right Ear: External ear normal.   Left Ear: External ear normal.   Nose: Nose normal.   Eyes: Right eye exhibits no discharge. Left eye exhibits no discharge. No scleral icterus.   Neck: Normal range of  motion.   Cardiovascular: Normal rate, regular rhythm and intact distal pulses.    Pulmonary/Chest: Effort normal. No respiratory distress. He has no wheezes.   Abdominal: Soft. He exhibits no distension. There is no tenderness.   Musculoskeletal: Normal range of motion. He exhibits no edema or tenderness.   Neurological: He is alert. He exhibits normal muscle tone.   Skin: Skin is warm and dry. No rash noted.   Psychiatric: He has a normal mood and affect. His behavior is normal. His speech is not slurred. Cognition and memory are not impaired.   Vitals reviewed.      Significant Labs:   CBC: No results for input(s): WBC, HGB, HCT, PLT in the last 48 hours.  CMP:     Recent Labs  Lab 05/21/18  0556      K 3.3*      CO2 26   GLU 89   BUN 17   CREATININE 0.8   CALCIUM 8.9   ANIONGAP 7*   EGFRNONAA >60.0     All pertinent labs within the past 24 hours have been reviewed.    Significant Imaging: I have reviewed and interpreted all pertinent imaging results/findings within the past 24 hours.

## 2018-05-21 NOTE — PT/OT/SLP PROGRESS
Physical Therapy Treatment    Patient Name:  Ryder Boo   MRN:  [id number]    Recommendations:     Discharge Recommendations:  rehabilitation facility   Discharge Equipment Recommendations: none   Barriers to discharge: Inaccessible home and Decreased caregiver support    Assessment:     Ryder Boo is a 63 y.o. male admitted with a medical diagnosis of Aspiration pneumonia.  He presents with the following impairments/functional limitations:  weakness, impaired endurance, impaired self care skills, impaired functional mobilty, gait instability, impaired balance, decreased lower extremity function, decreased ROM, decreased safety awareness. Pt tolerated treatment well, and will continue to benefit from PT services at this time. Continue with PT POC as indicated.     Rehab Prognosis:  Fair; patient would benefit from acute skilled PT services to address these deficits and reach maximum level of function.      Recent Surgery: * No surgery found *      Plan:     During this hospitalization, patient to be seen 4 x/week to address the above listed problems via gait training, therapeutic activities, therapeutic exercises, neuromuscular re-education  · Plan of Care Expires:  06/06/18   Plan of Care Reviewed with: patient    Subjective     Communicated with nursing prior to session.  Patient found supine upon PT entry to room, agreeable to treatment.      Chief Complaint: none stated  Patient comments/goals: none sated   Pain/Comfort:  · Pain Rating 1: 0/10  · Pain Rating Post-Intervention 1: 0/10    Patients cultural, spiritual, Shinto conflicts given the current situation: none stated    Objective:     Patient found with: bed alarm     General Precautions: Standard, aspiration, fall   Orthopedic Precautions:N/A   Braces: N/A     Functional Mobility:  · Bed Mobility:  Scooting: minimum assistance  · Supine to Sit: minimum assistance  · Sit to Supine: minimum assistance  · Transfers:  Sit to Stand: x2 trials minimum  assistance with rolling walker  · Gait: Not performed on this date / pt safety.       AM-PAC 6 CLICK MOBILITY  Turning over in bed (including adjusting bedclothes, sheets and blankets)?: 3  Sitting down on and standing up from a chair with arms (e.g., wheelchair, bedside commode, etc.): 3  Moving from lying on back to sitting on the side of the bed?: 3  Moving to and from a bed to a chair (including a wheelchair)?: 2  Need to walk in hospital room?: 2  Climbing 3-5 steps with a railing?: 1  Total Score: 14       Therapeutic Activities and Exercises:   -B LE therex x20 reps with AAROM: LAQ and Hip Flexion    Patient left supine with call button in reach and bed alarm on..    GOALS:    Physical Therapy Goals        Problem: Physical Therapy Goal    Goal Priority Disciplines Outcome Goal Variances Interventions   Physical Therapy Goal     PT/OT, PT Ongoing (interventions implemented as appropriate)     Description:  Goals to be met by: 2018    Patient will increase functional independence with mobility by performin. Supine to sit with Moderate Assistance - MET  Revised: Supine to sit with CGA  2. Sit to supine with Moderate Assistance -  Met    Revised: Sit to supine with CGA  3. Sit to stand transfer with Moderate Assistance using LRAD or no AD -  Met   4. Bed to chair transfer with Moderate Assistance using LRAD or no AD - not met  5. Gait  x 20 feet with Moderate Assistance using LRAD or no AD - not met  6. Lower extremity exercise program x20 reps per handout, with supervision - not met                         Time Tracking:     PT Received On: 18  PT Start Time: 1538     PT Stop Time: 1553  PT Total Time (min): 15 min     Billable Minutes: Therapeutic Activity 15    Treatment Type: Treatment  PT/PTA: PTA     PTA Visit Number: 2     Elysia Cabrales, PTA  2018

## 2018-05-21 NOTE — ASSESSMENT & PLAN NOTE
- Hgb stable; some concern for Hgb drop as outpatient. FOBT performed and positive; no gross evidence of bleed.  - will stop getting CBC

## 2018-05-21 NOTE — PROGRESS NOTES
[facility] Hospital Medicine  Progress Note    Patient Name: Ryder Boo  MRN: [id number]  Patient Class: IP- Inpatient   Admission Date: 5/6/2018  Length of Stay: 15 days  Attending Physician: Silver Mcfarland MD  Primary Care Provider: Swapnil Heart MD    [facility] Medicine Team: [facility] [place] 4 SILVIA Bañuelos    Subjective:     Principal Problem:Aspiration pneumonia    HPI:  The patient is a 62 y/o M new to our system, with HTN, COPD and alcoholism, who is [place] [facility] ICU [place] [facility] [place].     The patient lives with his brother across the street from his sister. He was found unresponsive in a chair with small amount of blood in his mouth by his nephew and taken to the hospital by EMS on 5/3. Patient drinks every day and has been having frequent falls recently. Per sister he has quit smoking 15 years ago.  In the ED at OSH he was found to be hypoxic and hypotensive, with undetectable EtOH levels, leukocytosis (24K) and hyponatremia (119) in the labs. he received narcan x1, IV fluids, started on metronidazole and ceftriaxone (also x1 of zosyn, azithromycin, vancomycin, ceftaroline) for aspiration pneumonia, and was put on 15L of Oxygen with non-rebreather, which improved his mental status and BP, however on 4/5 upon weaning off of oxygen he developed hypoxic hypercapnic respiratory failure and subsequent acidosis (pH 7.10), therefore he was intubated and transferred to the ICU. Patient [place] [facility] [place] gtt which per notes was started to maintain BP in the setting of hyponatremia.     Hospital Course:  5/6: upon arrival, patient was taken off of dopamine gtt and received 1.5L IV fluids to maintain MAPs. Continued on vanc/zosyn/azithromycin. Hyponatremia and ROHITH continued to improved with IVF. He was extubated in early afternoon and maintained normal O2 saturations on 3-4L oxygen on NC.   5/7: early morning he was noticed to be rattling  while breathing by the nurse. Upon examination he had increased work of breathing with worsening of bilateral rhonchi. Repeat CXR revealed interval worsening of the patchy infiltrations. Received x1 furosemide 60 mg IV and transitioned to high flow oxygen for possible development of ARDS, he is currently maintaining normal saturations, will keep on intubation watch.  5/8: several episodes of desaturation down to 70% overnight and early morning, that responded well to increasing oxygen to 100%, currently on 35L/50%, mildly drowsy due to precedex. Responded well to diuretics, net -2.8L. Will discontinue precedex and will have speech evaluate his swallowing.  5/9: Uneventful night off precedex. Remains confused, oriented to person and place. Oxygen requirements greatly improved 15L/50% with plan to wean to low-flow NC today. Diuresed -2.9L yesterday and discontinued lasix. Discontinued azithromycin and continued on zosyn. Failed speech eval by SLP and remains NPO  5/11-5/12 patient had NG tube placed, removed it and re-ordered on gentle tube feeds. On high o2 support at night as patient cannot have bipap due to NG.  05/14/2018 -Patient passed swallow eval to full liquids. He is now sating 100 % RA family updated awaiting rehab evaluation   05/17/2018 Patient was desating in the morning to 85-88 but he was alert oriented and no complaints gave him a breathing treatment   05/18/2018 awaiting placement  05/19/2018 CT, requested by rehab to r/o stroke, shows mild signs of possible NPH. Will order f/u o/p neuro. Patient reports x3 BMs, will see if continues. Will complete 1 more day of moxi.   05/20/2018 no acute events overnight patient on last day of moxi    Interval History: Patient with no acute events overnight, Patient resting comfortably.  still awaiting placement     Review of Systems   Constitutional: Negative for activity change, chills, fatigue and fever.   HENT: Negative for drooling, hearing loss, trouble  swallowing and voice change.    Eyes: Negative for pain and visual disturbance.   Respiratory: Negative for cough, shortness of breath and wheezing.    Cardiovascular: Negative for chest pain and palpitations.   Gastrointestinal: Negative for abdominal pain, nausea and vomiting.   Genitourinary: Negative for difficulty urinating and flank pain.   Musculoskeletal: Positive for back pain and gait problem. Negative for arthralgias, myalgias and neck pain.   Skin: Negative for rash and wound.   Neurological: Negative for dizziness, weakness, numbness and headaches.   Psychiatric/Behavioral: Negative for agitation, confusion and hallucinations. The patient is not nervous/anxious.      Objective:     Vital Signs (Most Recent):  Temp: 98.9 °F (37.2 °C) (05/21/18 1142)  Pulse: (!) 124 (05/21/18 1231)  Resp: 18 (05/21/18 1142)  BP: 126/70 (05/21/18 1231)  SpO2: (!) 91 % (05/21/18 1142) Vital Signs (24h Range):  Temp:  [98.6 °F (37 °C)-99 °F (37.2 °C)] 98.9 °F (37.2 °C)  Pulse:  [] 124  Resp:  [16-18] 18  SpO2:  [91 %-95 %] 91 %  BP: (116-141)/(59-87) 126/70     Weight: 76.2 kg (167 lb 15.9 oz)  Body mass index is 27.11 kg/m².    Intake/Output Summary (Last 24 hours) at 05/21/18 1345  Last data filed at 05/21/18 0600   Gross per 24 hour   Intake                0 ml   Output             2050 ml   Net            -2050 ml      Physical Exam   Constitutional: He appears well-developed and well-nourished. No distress.   HENT:   Head: Normocephalic and atraumatic.   Right Ear: External ear normal.   Left Ear: External ear normal.   Nose: Nose normal.   Eyes: Right eye exhibits no discharge. Left eye exhibits no discharge. No scleral icterus.   Neck: Normal range of motion.   Cardiovascular: Normal rate, regular rhythm and intact distal pulses.    Pulmonary/Chest: Effort normal. No respiratory distress. He has no wheezes.   Abdominal: Soft. He exhibits no distension. There is no tenderness.   Musculoskeletal: Normal range of  motion. He exhibits no edema or tenderness.   Neurological: He is alert. He exhibits normal muscle tone.   Skin: Skin is warm and dry. No rash noted.   Psychiatric: He has a normal mood and affect. His behavior is normal. His speech is not slurred. Cognition and memory are not impaired.   Vitals reviewed.      Significant Labs:   CBC: No results for input(s): WBC, HGB, HCT, PLT in the last 48 hours.  CMP:     Recent Labs  Lab 05/21/18  0556      K 3.3*      CO2 26   GLU 89   BUN 17   CREATININE 0.8   CALCIUM 8.9   ANIONGAP 7*   EGFRNONAA >60.0     All pertinent labs within the past 24 hours have been reviewed.    Significant Imaging: I have reviewed and interpreted all pertinent imaging results/findings within the past 24 hours.    Assessment/Plan:      * Aspiration pneumonia    - At OSH had significant leukocytosis with WBCs to 38K.  - CXR with bilateral multifocal infiltration.  - Suspect aspiration secondary to AMS.  -Completed course of Moxi. Last day was 5/20.         Moderate malnutrition    - continue boost and full diet               Hypokalemia    - K 3.3 today and Mag 1.5.   Will replace today.           Anemia    - Hgb stable; some concern for Hgb drop as outpatient. FOBT performed and positive; no gross evidence of bleed.  - will stop getting CBC         Fibrosis of lung              Acute respiratory failure with hypoxia and hypercapnia    - Former tobacco abuse with history of COPD; concern for volume overload in ICU setting as well and underwent some diuresis. Respiratory status improving now; on 05/10 did have significant respiratory demand prompting CTA; negative for thromboembolic process.  - Continuing albuterol-ipratropium 2.5-0.5mg inhaled PRN   - now on NC   -Last day of moxi on 5/20.           Alcoholism /alcohol abuse    - History of alcohol abuse; continuing folic acid, thiamine daily.          VTE Risk Mitigation         Ordered     enoxaparin injection 40 mg  Daily       05/06/18 1016     Place sequential compression device  Until discontinued      05/06/18 0113     IP VTE LOW RISK PATIENT  Once      05/06/18 0113        Dispo: Awaiting placement.       SILVIA Bañuelos  Department of Hospital Medicine   [facility]

## 2018-05-21 NOTE — PLAN OF CARE
[facility] & [facility] - currently being reviewed. CM received call from pt's sisterSejal - update provided and CM also gave pt's room phone number to sister.     05/21/18 8897   Discharge Reassessment   Assessment Type Discharge Planning Reassessment   Provided patient/caregiver education on the expected discharge date and the discharge plan Yes   Do you have any problems affording any of your prescribed medications? No   Discharge Plan A Rehab   Discharge Plan B Home with family;Home Health   Patient choice form signed by patient/caregiver N/A   Can the patient answer the patient profile reliably? Yes, cognitively intact   How does the patient rate their overall health at the present time? Fair   Describe the patient's ability to walk at the present time. Major restrictions/daily assistance from another person   How often would a person be available to care for the patient? Occasionally   Number of comorbid conditions (as recorded on the chart) Three

## 2018-05-21 NOTE — ASSESSMENT & PLAN NOTE
- Former tobacco abuse with history of COPD; concern for volume overload in ICU setting as well and underwent some diuresis. Respiratory status improving now; on 05/10 did have significant respiratory demand prompting CTA; negative for thromboembolic process.  - Continuing albuterol-ipratropium 2.5-0.5mg inhaled PRN   - now on NC  -Last day of moxi on 5/20.

## 2018-05-21 NOTE — ASSESSMENT & PLAN NOTE
- At OSH had significant leukocytosis with WBCs to 38K.  - CXR with bilateral multifocal infiltration.  - Suspect aspiration secondary to AMS.  -Completed course of Moxi. Last day was 5/20.

## 2018-05-22 PROBLEM — E83.42 HYPOMAGNESEMIA: Status: RESOLVED | Noted: 2018-05-10 | Resolved: 2018-05-22

## 2018-05-22 PROBLEM — E87.6 HYPOKALEMIA: Status: RESOLVED | Noted: 2018-05-10 | Resolved: 2018-05-22

## 2018-05-22 LAB
ALBUMIN SERPL BCP-MCNC: 2.3 G/DL
ALP SERPL-CCNC: 43 U/L
ALT SERPL W/O P-5'-P-CCNC: 13 U/L
ANION GAP SERPL CALC-SCNC: 10 MMOL/L
AST SERPL-CCNC: 13 U/L
BASOPHILS # BLD AUTO: 0.03 K/UL
BASOPHILS NFR BLD: 0.5 %
BILIRUB SERPL-MCNC: 0.4 MG/DL
BUN SERPL-MCNC: 14 MG/DL
CALCIUM SERPL-MCNC: 8.6 MG/DL
CHLORIDE SERPL-SCNC: 103 MMOL/L
CO2 SERPL-SCNC: 25 MMOL/L
CREAT SERPL-MCNC: 0.8 MG/DL
DIFFERENTIAL METHOD: ABNORMAL
EOSINOPHIL # BLD AUTO: 0.2 K/UL
EOSINOPHIL NFR BLD: 2.9 %
ERYTHROCYTE [DISTWIDTH] IN BLOOD BY AUTOMATED COUNT: 14.8 %
EST. GFR  (AFRICAN AMERICAN): >60 ML/MIN/1.73 M^2
EST. GFR  (NON AFRICAN AMERICAN): >60 ML/MIN/1.73 M^2
GLUCOSE SERPL-MCNC: 89 MG/DL
HCT VFR BLD AUTO: 28.9 %
HGB BLD-MCNC: 9.1 G/DL
IMM GRANULOCYTES # BLD AUTO: 0.03 K/UL
IMM GRANULOCYTES NFR BLD AUTO: 0.5 %
LYMPHOCYTES # BLD AUTO: 0.6 K/UL
LYMPHOCYTES NFR BLD: 9.6 %
MCH RBC QN AUTO: 29.8 PG
MCHC RBC AUTO-ENTMCNC: 31.5 G/DL
MCV RBC AUTO: 95 FL
MONOCYTES # BLD AUTO: 1 K/UL
MONOCYTES NFR BLD: 16.6 %
NEUTROPHILS # BLD AUTO: 4.4 K/UL
NEUTROPHILS NFR BLD: 69.9 %
NRBC BLD-RTO: 0 /100 WBC
PLATELET # BLD AUTO: 300 K/UL
PMV BLD AUTO: 10.6 FL
POTASSIUM SERPL-SCNC: 3.7 MMOL/L
PROT SERPL-MCNC: 6.9 G/DL
RBC # BLD AUTO: 3.05 M/UL
SODIUM SERPL-SCNC: 138 MMOL/L
WBC # BLD AUTO: 6.28 K/UL

## 2018-05-22 PROCEDURE — 94664 DEMO&/EVAL PT USE INHALER: CPT

## 2018-05-22 PROCEDURE — 25000003 PHARM REV CODE 250: Performed by: INTERNAL MEDICINE

## 2018-05-22 PROCEDURE — 80053 COMPREHEN METABOLIC PANEL: CPT

## 2018-05-22 PROCEDURE — 97110 THERAPEUTIC EXERCISES: CPT

## 2018-05-22 PROCEDURE — 92526 ORAL FUNCTION THERAPY: CPT

## 2018-05-22 PROCEDURE — 63600175 PHARM REV CODE 636 W HCPCS: Performed by: STUDENT IN AN ORGANIZED HEALTH CARE EDUCATION/TRAINING PROGRAM

## 2018-05-22 PROCEDURE — 93010 ELECTROCARDIOGRAM REPORT: CPT | Mod: ,,, | Performed by: INTERNAL MEDICINE

## 2018-05-22 PROCEDURE — 94761 N-INVAS EAR/PLS OXIMETRY MLT: CPT

## 2018-05-22 PROCEDURE — 99232 SBSQ HOSP IP/OBS MODERATE 35: CPT | Mod: ,,, | Performed by: HOSPITALIST

## 2018-05-22 PROCEDURE — 85025 COMPLETE CBC W/AUTO DIFF WBC: CPT

## 2018-05-22 PROCEDURE — 36415 COLL VENOUS BLD VENIPUNCTURE: CPT

## 2018-05-22 PROCEDURE — 92507 TX SP LANG VOICE COMM INDIV: CPT

## 2018-05-22 PROCEDURE — 20600001 HC STEP DOWN PRIVATE ROOM

## 2018-05-22 PROCEDURE — 97535 SELF CARE MNGMENT TRAINING: CPT

## 2018-05-22 PROCEDURE — 93005 ELECTROCARDIOGRAM TRACING: CPT

## 2018-05-22 PROCEDURE — 27000646 HC AEROBIKA DEVICE

## 2018-05-22 RX ADMIN — THIAMINE HCL TAB 100 MG 100 MG: 100 TAB at 09:05

## 2018-05-22 RX ADMIN — ENOXAPARIN SODIUM 40 MG: 100 INJECTION SUBCUTANEOUS at 06:05

## 2018-05-22 RX ADMIN — FOLIC ACID 1 MG: 1 TABLET ORAL at 09:05

## 2018-05-22 NOTE — ASSESSMENT & PLAN NOTE
- Hgb stable; some concern for Hgb drop as outpatient. FOBT performed and positive; no gross evidence of bleed.

## 2018-05-22 NOTE — PROGRESS NOTES
[facility] Hospital Medicine  Progress Note    Patient Name: Ryder Boo  MRN: [id number]  Patient Class: IP- Inpatient   Admission Date: 5/6/2018  Length of Stay: 16 days  Attending Physician: Shaylee Chao MD  Primary Care Provider: Swapnil Heart MD    [facility] Medicine Team: [facility] MED 4 SILVIA Bañuelos    Subjective:     Principal Problem:Aspiration pneumonia    HPI:  The patient is a 64 y/o M new to our system, with HTN, COPD and alcoholism, who is transfer from [facility] ICU to [facility] on mechanical ventilation.     The patient lives with his brother across the street from his sister. He was found unresponsive in a chair with small amount of blood in his mouth by his nephew and taken to the hospital by EMS on 5/3. Patient drinks every day and has been having frequent falls recently. Per sister he has quit smoking 15 years ago.  In the ED at OSH he was found to be hypoxic and hypotensive, with undetectable EtOH levels, leukocytosis (24K) and hyponatremia (119) in the labs. he received narcan x1, IV fluids, started on metronidazole and ceftriaxone (also x1 of zosyn, azithromycin, vancomycin, ceftaroline) for aspiration pneumonia, and was put on 15L of Oxygen with non-rebreather, which improved his mental status and BP, however on 4/5 upon weaning off of oxygen he developed hypoxic hypercapnic respiratory failure and subsequent acidosis (pH 7.10), therefore he was intubated and transferred to the ICU. Patient arrived to [facility] on dopamine gtt which per notes was started to maintain BP in the setting of hyponatremia.     Hospital Course:  5/6: upon arrival, patient was taken off of dopamine gtt and received 1.5L IV fluids to maintain MAPs. Continued on vanc/zosyn/azithromycin. Hyponatremia and ROHITH continued to improved with IVF. He was extubated in early afternoon and maintained normal O2 saturations on 3-4L oxygen on NC.   5/7: early morning he was noticed to be rattling  while breathing by the nurse. Upon examination he had increased work of breathing with worsening of bilateral rhonchi. Repeat CXR revealed interval worsening of the patchy infiltrations. Received x1 furosemide 60 mg IV and transitioned to high flow oxygen for possible development of ARDS, he is currently maintaining normal saturations, will keep on intubation watch.  5/8: several episodes of desaturation down to 70% overnight and early morning, that responded well to increasing oxygen to 100%, currently on 35L/50%, mildly drowsy due to precedex. Responded well to diuretics, net -2.8L. Will discontinue precedex and will have speech evaluate his swallowing.  5/9: Uneventful night off precedex. Remains confused, oriented to person and place. Oxygen requirements greatly improved 15L/50% with plan to wean to low-flow NC today. Diuresed -2.9L yesterday and discontinued lasix. Discontinued azithromycin and continued on zosyn. Failed speech eval by SLP and remains NPO  5/11-5/12 patient had NG tube placed, removed it and re-ordered on gentle tube feeds. On high o2 support at night as patient cannot have bipap due to NG.  05/14/2018 -Patient passed swallow eval to full liquids. He is now sating 100 % RA family updated awaiting rehab evaluation   05/17/2018 Patient was desating in the morning to 85-88 but he was alert oriented and no complaints gave him a breathing treatment   05/18/2018 awaiting placement  05/19/2018 CT, requested by rehab to r/o stroke, shows mild signs of possible NPH. Will order f/u o/p neuro. Patient reports x3 BMs, will see if continues. Will complete 1 more day of moxi.   05/20/2018 no acute events overnight patient on last day of moxi    Interval History: Patient with no acute events overnight, Patient resting comfortably.  still awaiting placement     Review of Systems   Constitutional: Negative for activity change, chills, fatigue and fever.   HENT: Negative for drooling, hearing loss, trouble  swallowing and voice change.    Eyes: Negative for pain and visual disturbance.   Respiratory: Negative for cough, shortness of breath and wheezing.    Cardiovascular: Negative for chest pain and palpitations.   Gastrointestinal: Negative for abdominal pain, nausea and vomiting.   Genitourinary: Negative for difficulty urinating and flank pain.   Musculoskeletal: Positive for back pain and gait problem. Negative for arthralgias, myalgias and neck pain.   Skin: Negative for rash and wound.   Neurological: Negative for dizziness, weakness, numbness and headaches.   Psychiatric/Behavioral: Negative for agitation, confusion and hallucinations. The patient is not nervous/anxious.      Objective:     Vital Signs (Most Recent):  Temp: 97.3 °F (36.3 °C) (05/22/18 1250)  Pulse: (!) 124 (05/22/18 1250)  Resp: 18 (05/22/18 1250)  BP: 104/76 (05/22/18 1250)  SpO2: (!) 89 % (05/22/18 1250) Vital Signs (24h Range):  Temp:  [97.3 °F (36.3 °C)-99.4 °F (37.4 °C)] 97.3 °F (36.3 °C)  Pulse:  [] 124  Resp:  [15-20] 18  SpO2:  [89 %-97 %] 89 %  BP: (104-137)/(68-98) 104/76     Weight: 74.4 kg (164 lb 0.4 oz)  Body mass index is 26.47 kg/m².    Intake/Output Summary (Last 24 hours) at 05/22/18 1351  Last data filed at 05/22/18 0600   Gross per 24 hour   Intake                0 ml   Output              675 ml   Net             -675 ml      Physical Exam   Constitutional: He appears well-developed and well-nourished. No distress.   HENT:   Head: Normocephalic and atraumatic.   Right Ear: External ear normal.   Left Ear: External ear normal.   Nose: Nose normal.   Eyes: Right eye exhibits no discharge. Left eye exhibits no discharge. No scleral icterus.   Neck: Normal range of motion.   Cardiovascular: Normal rate, regular rhythm and intact distal pulses.    Pulmonary/Chest: Effort normal. No respiratory distress. He has no wheezes.   Abdominal: Soft. He exhibits no distension. There is no tenderness.   Musculoskeletal: Normal range  of motion. He exhibits no edema or tenderness.   Neurological: He is alert. He exhibits normal muscle tone.   Skin: Skin is warm and dry. No rash noted.   Psychiatric: He has a normal mood and affect. His behavior is normal. His speech is not slurred. Cognition and memory are not impaired.   Vitals reviewed.      Significant Labs:   CBC:     Recent Labs  Lab 05/22/18  0618   WBC 6.28   HGB 9.1*   HCT 28.9*        CMP:     Recent Labs  Lab 05/21/18  0556 05/22/18  0618    138   K 3.3* 3.7    103   CO2 26 25   GLU 89 89   BUN 17 14   CREATININE 0.8 0.8   CALCIUM 8.9 8.6*   PROT  --  6.9   ALBUMIN  --  2.3*   BILITOT  --  0.4   ALKPHOS  --  43*   AST  --  13   ALT  --  13   ANIONGAP 7* 10   EGFRNONAA >60.0 >60.0     All pertinent labs within the past 24 hours have been reviewed.    Significant Imaging: I have reviewed and interpreted all pertinent imaging results/findings within the past 24 hours.    Assessment/Plan:      * Aspiration pneumonia    - At OSH had significant leukocytosis with WBCs to 38K.  - CXR with bilateral multifocal infiltration.  - Suspect aspiration secondary to AMS.  -Completed course of Moxi. Last day was 5/20.         Moderate malnutrition    - continue boost and full diet           Anemia    - Hgb stable; some concern for Hgb drop as outpatient. FOBT performed and positive; no gross evidence of bleed.          Fibrosis of lung              Acute respiratory failure with hypoxia and hypercapnia    - Former tobacco abuse with history of COPD; concern for volume overload in ICU setting as well and underwent some diuresis. Respiratory status improving now; on 05/10 did have significant respiratory demand prompting CTA; negative for thromboembolic process.  - Continuing albuterol-ipratropium 2.5-0.5mg inhaled PRN   - now on NC  -Last day of moxi on 5/20.           Alcoholism /alcohol abuse    - History of alcohol abuse; continuing folic acid, thiamine daily.          VTE Risk  Mitigation         Ordered     enoxaparin injection 40 mg  Daily      05/06/18 1016     Place sequential compression device  Until discontinued      05/06/18 0113     IP VTE LOW RISK PATIENT  Once      05/06/18 0113              SILVIA Bañuelos  Department of Hospital Medicine   [facility]

## 2018-05-22 NOTE — PT/OT/SLP PROGRESS
Physical Therapy Treatment    Patient Name:  Ryder Boo   MRN:  [id number]    Recommendations:     Discharge Recommendations:  rehabilitation facility   Discharge Equipment Recommendations: bedside commode, shower chair, walker, rolling   Barriers to discharge: None    Assessment:     Ryder Boo is a 63 y.o. male admitted with a medical diagnosis of Aspiration pneumonia.  He presents with the following impairments/functional limitations:  weakness, impaired endurance, impaired functional mobilty, gait instability, impaired self care skills, impaired balance, decreased lower extremity function, decreased safety awareness, impaired cardiopulmonary response to activity limiting overall functional mobility. Safest to transfer with assist of 1 person at this time. Minimal fatigue noted with seated LE therex. Moderate fatigue noted with static stand attempt and stand pivot transfer back to bed. Declined ambulation on this visit secondary to fear and fatigue. To benefit from continued skilled PT intervention to address deficits.    Rehab Prognosis:  RN; patient would benefit from acute skilled PT services to address these deficits and reach maximum level of function.      Recent Surgery: * No surgery found *      Plan:     During this hospitalization, patient to be seen 4 x/week to address the above listed problems via gait training, therapeutic activities, therapeutic exercises, neuromuscular re-education  · Plan of Care Expires:  06/06/18   Plan of Care Reviewed with: patient    Subjective     Communicated with seated in bedside chair prior to session.  Patient found supine upon PT entry to room, agreeable to treatment.      Chief Complaint: SOB  Patient comments/goals: walk  Pain/Comfort:  · Pain Rating 1: 0/10  · Pain Rating Post-Intervention 1: 0/10    Patients cultural, spiritual, Episcopal conflicts given the current situation: none stated    Objective:     Patient found with:  (chair alarm)     General  Precautions: Standard, aspiration, fall, nectar thick   Orthopedic Precautions:N/A   Braces: N/A     Functional Mobility:  · Transfers:  Sit to Stand:  minimum assistance with rolling walker  · Bed to Chair: moderate assistance with  rolling walker  using  Stand Pivot  · Gait: 2 steps back to bed from bedside chair with Mod Assist using RW. Demonstrated significant forward trunk lean limiting ble foot clearance  · Balance: requiring Mod Assist for stand pivot transfer      AM-PAC 6 CLICK MOBILITY  Turning over in bed (including adjusting bedclothes, sheets and blankets)?: 3  Sitting down on and standing up from a chair with arms (e.g., wheelchair, bedside commode, etc.): 3  Moving from lying on back to sitting on the side of the bed?: 3  Moving to and from a bed to a chair (including a wheelchair)?: 2  Need to walk in hospital room?: 2  Climbing 3-5 steps with a railing?: 1  Total Score: 14       Therapeutic Activities and Exercises:   Patient educated on:   - role of PT/POC    - safety with all functional mobility   - transfer training   - gait training with rolling walker   - seated LE therex to be performed 2x/day   - deep breathing techniques   - importance of participation with therapy services   - safe to transfer with 1 person assist at this time.    Verbalized understanding of all education provided.    Seated LE therex performed x 10-20 reps each:   - ankle pumps   - LAQ   - March   - Step out   - Pillow squeeze    Patient left seated EOB with all lines intact, call button in reach, bed alarm on and RN notified..    GOALS:    Physical Therapy Goals        Problem: Physical Therapy Goal    Goal Priority Disciplines Outcome Goal Variances Interventions   Physical Therapy Goal     PT/OT, PT Ongoing (interventions implemented as appropriate)     Description:  Goals to be met by: 2018    Patient will increase functional independence with mobility by performin. Supine to sit with Moderate Assistance -  MET  Revised: Supine to sit with CGA  2. Sit to supine with Moderate Assistance -  Met 5/9   Revised: Sit to supine with CGA  3. Sit to stand transfer with Moderate Assistance using LRAD or no AD -  Met 5/19  Revised: Sit-stand transfer with CGA using LRAD  4. Bed to chair transfer with Moderate Assistance using LRAD or no AD - met  Revised: Bed-chair transfer with Min Assist using LRAD  5. Gait  x 20 feet with Moderate Assistance using LRAD or no AD - not met  6. Lower extremity exercise program x20 reps per handout, with supervision - met                          Time Tracking:     PT Received On: 05/22/18  PT Start Time: 1125     PT Stop Time: 1140  PT Total Time (min): 15 min     Billable Minutes: Therapeutic Exercise 10    Treatment Type: Treatment  PT/PTA: PT     PTA Visit Number: 2     Abiel Sebastian III, PT  05/22/2018

## 2018-05-22 NOTE — PLAN OF CARE
MARK is aware of Rehab denial an attempting to set up P2P for denial. MARK also received call from arash Pryor planner p [phone] [facility] who [facility] (she is also faxing list to CM main office) [facility], [facility], [facility], [place]  and [facility]. CM contacted pt's sister, Sejal and left msg about denial, P2P and possible SNF choices and requested callback to discuss the plan.    Update: P2P completed. Dr. [facility] will contact Dr. Chao within 1 business day during normal business hours for discussion of denial case.

## 2018-05-22 NOTE — PT/OT/SLP PROGRESS
"Speech Language Pathology Treatment    Patient Name:  Ryder Boo   MRN:  [id number]   1045/1045 A    Admitting Diagnosis: Aspiration pneumonia    Recommendations:                 General Recommendations:  Dysphagia therapy, Speech/language therapy and Cognitive-linguistic therapy  Diet recommendations:  Dental Soft, Liquid Diet Level: Nectar Thick -one packet of thickener for every 6 ounces of liquid  Aspiration Precautions: 1 bite/sip at a time, Alternating bites/sips, HOB to 90 degrees, Meds whole 1 at a time, Monitor for s/s of aspiration, Small bites/sips and Standard aspiration precautions   General Precautions: Standard, aspiration, fall, nectar thick  Communication strategies:  provide increased time to answer and go to room if call light pushed    Subjective     "I feel abdirashid dizzy." RN notified, however, patient reporting feeling better when SLP returned to room s/p notifying RN.   Patient awake and cooperative    Pain/Comfort:   no pain    Objective:     Has the patient been evaluated by SLP for swallowing?   Yes  Keep patient NPO? No   Current Respiratory Status: room air      Pt was awake and alert in seated in chair.  He was agreeable to tx session.  He followed complex directives w/ 80% acc given cues meeting short term goal.  He answer functional problem solving questions given min cues. He completed a word deduction task with 60% acc no cues increasing to 80% acc given min cues. WRAP (Write, Repeat, Associate, Picture) memory strategy introduced this session. Patient immediately recalled acronym and what each letter stood for. Patient recalled 1/4 words after a 3 minute filled delay. He required cue to recall WRAP acronym after a 3 minute delay.     Patient was assessed with sips of water via cup x4. Patient presented with an immediate cough following 4/4 trials and a choking episode x1 (final thin liquid trial). Patient stated, "That hasn't happened in a long time." Patient assessed with nectar " "thick water via cup x5. He presented with no overt s/s of aspiration or pharyngeal dysphagia with nectar thick liquids. Patient reported nectar thick liquids, "Don't taste like anything. It's easier though." SLP provided patient education on nectar thick liquid recommendation, SLP role, s/s and risks of aspiraiton, safe swallow precautions, and POC. Patient in agreement with nectar thick liquids and verbalized understanding of all discussed.     S/p all trials, patient reporting feeling dizzy and SLP notified RN. Upon RN entry, patient reporting no more dizziness. White board updated. RN notified of nectar thick liquid recommendation.     Assessment:     Ryder Boo is a 63 y.o. male with an SLP diagnosis of Dysphagia and Cognitive-Linguistic Impairment.  He presents with steady improvement towards cognitive-linguistic goals. Patient presents with decreased toelrance of thin liquids this date. Recommend nectar thick liquids.     Goals:    SLP Goals        Problem: SLP Goal    Goal Priority Disciplines Outcome   SLP Goal     SLP Ongoing (interventions implemented as appropriate)   Description:  Updated Short Term Goals  Goals expected to be met by 6/6  1. Patient will tolerate dental soft solids with no overt s/s of aspiration.   2. Patient will tolerate NECTAR thick liquids with no overt s/s of aspiration.   4. Pt will complete functional delayed memory task with 80% accy and mod cues  5. Pt will follow complex commands with 80% accy and min cues  6. Pt will complete problem solving tasks with 90% accy and min cues  7. Pt will complete complex word finding task with 80% accy min cues    Goals to be met 5/15  1. Pt will participate in ongoing swallow eval   2. Pt will tolerate full liquids PO diet with no overt s/s of aspiration.   MET  3. Pt will tolerate dental soft diet/thin liquids without overt s/s of aspiration.  -modified  4. Pt will complete functional memory task with 80% accy and mod cues -ongoing  5. Pt " will follow complex commands with 70% accy and mod cues. -Met; modified  6. Pt will complete problem solving tasks with 70% accy and min cues. -met, modified  7. Pt will complete word finding task with 80% accy and occasional cues.  -met; modified                    Plan:     · Patient to be seen:  4 x/week   · Plan of Care expires:     · Plan of Care reviewed with:  patient   · SLP Follow-Up:  Yes       Discharge recommendations:  rehabilitation facility   Barriers to Discharge:  Decreased Care Giver Support    Time Tracking:     SLP Treatment Date:   05/22/18  Speech Start Time:  1017  Speech Stop Time:  1041     Speech Total Time (min):  24 min    Billable Minutes: Speech Therapy Individual 8 and Treatment Swallowing Dysfunction 8 Self Care Home Management 8    SHAUN Hercules, CCC-SLP   Pager: [phone]  05/22/2018

## 2018-05-22 NOTE — PLAN OF CARE
10:57 AM  Pt declined by [facility] spoke w/Kathy at [facility].    Reported she submitted the precert packet and the process has begun.  States she's waiting to hear back from [facility].  States she will notify [facility] w/update.      DENISE Maria  [facility]

## 2018-05-22 NOTE — PLAN OF CARE
3:56 PM  SW sent referrals to SNF's, awaiting response.    SW following for DC needs.  EDUAR in communication with CM.        DENISE Maria  [facility]

## 2018-05-22 NOTE — PLAN OF CARE
12:29 PM  SW spoke w/Theresa rep at/[facility] .   Reporte [facility]'s Medical Director denied rehab and recommended Pt go to SNF.    Provided Peer to Peer # [phone].      DENISE Maria  [facility]

## 2018-05-22 NOTE — PLAN OF CARE
Problem: SLP Goal  Goal: SLP Goal  Updated Short Term Goals  Goals expected to be met by 6/6  1. Patient will tolerate dental soft solids with no overt s/s of aspiration.   2. Patient will tolerate NECTAR thick liquids with no overt s/s of aspiration.   4. Pt will complete functional delayed memory task with 80% accy and mod cues  5. Pt will follow complex commands with 80% accy and min cues  6. Pt will complete problem solving tasks with 90% accy and min cues  7. Pt will complete complex word finding task with 80% accy min cues    Goals to be met 5/15  1. Pt will participate in ongoing swallow eval   2. Pt will tolerate full liquids PO diet with no overt s/s of aspiration.   MET  3. Pt will tolerate dental soft diet/thin liquids without overt s/s of aspiration.  -modified  4. Pt will complete functional memory task with 80% accy and mod cues -ongoing  5. Pt will follow complex commands with 70% accy and mod cues. -Met; modified  6. Pt will complete problem solving tasks with 70% accy and min cues. -met, modified  7. Pt will complete word finding task with 80% accy and occasional cues.  -met; modified  Outcome: Ongoing (interventions implemented as appropriate)  Goals updated. Recommend downgrade to nectar thick liquids 2/2 noted consistent coughing following thin liquid trials. No overt s.s of aspiration with nectar thick liquids. ST will continue to follow.   DALE Vasquez., CCC-SLP  Pager: [phone]  05/22/2018

## 2018-05-22 NOTE — PLAN OF CARE
Problem: Patient Care Overview  Goal: Plan of Care Review    Outcome: Ongoing (interventions implemented as appropriate)  Patient alert, oriented to person and place. Pleasant and cooperative with care. VSS. PIV changed, new 22G placed to left forearm. Patient incontinent, condom cath placed and ointment applied to groin rash. Slept overnight, no acute changes or complaints.

## 2018-05-22 NOTE — SUBJECTIVE & OBJECTIVE
Interval History: Patient with no acute events overnight, Patient resting comfortably.  still awaiting placement     Review of Systems   Constitutional: Negative for activity change, chills, fatigue and fever.   HENT: Negative for drooling, hearing loss, trouble swallowing and voice change.    Eyes: Negative for pain and visual disturbance.   Respiratory: Negative for cough, shortness of breath and wheezing.    Cardiovascular: Negative for chest pain and palpitations.   Gastrointestinal: Negative for abdominal pain, nausea and vomiting.   Genitourinary: Negative for difficulty urinating and flank pain.   Musculoskeletal: Positive for back pain and gait problem. Negative for arthralgias, myalgias and neck pain.   Skin: Negative for rash and wound.   Neurological: Negative for dizziness, weakness, numbness and headaches.   Psychiatric/Behavioral: Negative for agitation, confusion and hallucinations. The patient is not nervous/anxious.      Objective:     Vital Signs (Most Recent):  Temp: 97.3 °F (36.3 °C) (05/22/18 1250)  Pulse: (!) 124 (05/22/18 1250)  Resp: 18 (05/22/18 1250)  BP: 104/76 (05/22/18 1250)  SpO2: (!) 89 % (05/22/18 1250) Vital Signs (24h Range):  Temp:  [97.3 °F (36.3 °C)-99.4 °F (37.4 °C)] 97.3 °F (36.3 °C)  Pulse:  [] 124  Resp:  [15-20] 18  SpO2:  [89 %-97 %] 89 %  BP: (104-137)/(68-98) 104/76     Weight: 74.4 kg (164 lb 0.4 oz)  Body mass index is 26.47 kg/m².    Intake/Output Summary (Last 24 hours) at 05/22/18 1351  Last data filed at 05/22/18 0600   Gross per 24 hour   Intake                0 ml   Output              675 ml   Net             -675 ml      Physical Exam   Constitutional: He appears well-developed and well-nourished. No distress.   HENT:   Head: Normocephalic and atraumatic.   Right Ear: External ear normal.   Left Ear: External ear normal.   Nose: Nose normal.   Eyes: Right eye exhibits no discharge. Left eye exhibits no discharge. No scleral icterus.   Neck: Normal range of  motion.   Cardiovascular: Normal rate, regular rhythm and intact distal pulses.    Pulmonary/Chest: Effort normal. No respiratory distress. He has no wheezes.   Abdominal: Soft. He exhibits no distension. There is no tenderness.   Musculoskeletal: Normal range of motion. He exhibits no edema or tenderness.   Neurological: He is alert. He exhibits normal muscle tone.   Skin: Skin is warm and dry. No rash noted.   Psychiatric: He has a normal mood and affect. His behavior is normal. His speech is not slurred. Cognition and memory are not impaired.   Vitals reviewed.      Significant Labs:   CBC:     Recent Labs  Lab 05/22/18  0618   WBC 6.28   HGB 9.1*   HCT 28.9*        CMP:     Recent Labs  Lab 05/21/18  0556 05/22/18  0618    138   K 3.3* 3.7    103   CO2 26 25   GLU 89 89   BUN 17 14   CREATININE 0.8 0.8   CALCIUM 8.9 8.6*   PROT  --  6.9   ALBUMIN  --  2.3*   BILITOT  --  0.4   ALKPHOS  --  43*   AST  --  13   ALT  --  13   ANIONGAP 7* 10   EGFRNONAA >60.0 >60.0     All pertinent labs within the past 24 hours have been reviewed.    Significant Imaging: I have reviewed and interpreted all pertinent imaging results/findings within the past 24 hours.

## 2018-05-23 LAB
ANION GAP SERPL CALC-SCNC: 13 MMOL/L
BUN SERPL-MCNC: 18 MG/DL
CALCIUM SERPL-MCNC: 8.8 MG/DL
CHLORIDE SERPL-SCNC: 103 MMOL/L
CO2 SERPL-SCNC: 21 MMOL/L
CREAT SERPL-MCNC: 0.9 MG/DL
EST. GFR  (AFRICAN AMERICAN): >60 ML/MIN/1.73 M^2
EST. GFR  (NON AFRICAN AMERICAN): >60 ML/MIN/1.73 M^2
GLUCOSE SERPL-MCNC: 89 MG/DL
MAGNESIUM SERPL-MCNC: 1.6 MG/DL
POTASSIUM SERPL-SCNC: 3.8 MMOL/L
SODIUM SERPL-SCNC: 137 MMOL/L

## 2018-05-23 PROCEDURE — 63600175 PHARM REV CODE 636 W HCPCS: Performed by: STUDENT IN AN ORGANIZED HEALTH CARE EDUCATION/TRAINING PROGRAM

## 2018-05-23 PROCEDURE — 99232 SBSQ HOSP IP/OBS MODERATE 35: CPT | Mod: ,,, | Performed by: HOSPITALIST

## 2018-05-23 PROCEDURE — 25000003 PHARM REV CODE 250: Performed by: INTERNAL MEDICINE

## 2018-05-23 PROCEDURE — 92507 TX SP LANG VOICE COMM INDIV: CPT

## 2018-05-23 PROCEDURE — 36415 COLL VENOUS BLD VENIPUNCTURE: CPT

## 2018-05-23 PROCEDURE — 99900035 HC TECH TIME PER 15 MIN (STAT)

## 2018-05-23 PROCEDURE — 94664 DEMO&/EVAL PT USE INHALER: CPT

## 2018-05-23 PROCEDURE — 83735 ASSAY OF MAGNESIUM: CPT

## 2018-05-23 PROCEDURE — 97535 SELF CARE MNGMENT TRAINING: CPT

## 2018-05-23 PROCEDURE — 20600001 HC STEP DOWN PRIVATE ROOM

## 2018-05-23 PROCEDURE — 92526 ORAL FUNCTION THERAPY: CPT

## 2018-05-23 PROCEDURE — 25000242 PHARM REV CODE 250 ALT 637 W/ HCPCS: Performed by: INTERNAL MEDICINE

## 2018-05-23 PROCEDURE — 93010 ELECTROCARDIOGRAM REPORT: CPT | Mod: ,,, | Performed by: INTERNAL MEDICINE

## 2018-05-23 PROCEDURE — 94640 AIRWAY INHALATION TREATMENT: CPT

## 2018-05-23 PROCEDURE — 80048 BASIC METABOLIC PNL TOTAL CA: CPT

## 2018-05-23 PROCEDURE — 93005 ELECTROCARDIOGRAM TRACING: CPT

## 2018-05-23 PROCEDURE — 97116 GAIT TRAINING THERAPY: CPT

## 2018-05-23 PROCEDURE — 94761 N-INVAS EAR/PLS OXIMETRY MLT: CPT

## 2018-05-23 PROCEDURE — 97530 THERAPEUTIC ACTIVITIES: CPT

## 2018-05-23 RX ADMIN — IPRATROPIUM BROMIDE AND ALBUTEROL SULFATE 3 ML: .5; 3 SOLUTION RESPIRATORY (INHALATION) at 05:05

## 2018-05-23 RX ADMIN — THIAMINE HCL TAB 100 MG 100 MG: 100 TAB at 08:05

## 2018-05-23 RX ADMIN — FOLIC ACID 1 MG: 1 TABLET ORAL at 08:05

## 2018-05-23 RX ADMIN — ENOXAPARIN SODIUM 40 MG: 100 INJECTION SUBCUTANEOUS at 05:05

## 2018-05-23 NOTE — ASSESSMENT & PLAN NOTE
"CT 5/7  "traction effects in the inferior aspect of the right middle lobe and inferior aspect of the basal segments of the right lower lobe suggesting fibrosis and consequent scarring"  Also  There are bilateral pleural calcifications peripherally and over the diaphragm consistent with asbestos related pleural disease.  Patient needs pulmonary f/u  "

## 2018-05-23 NOTE — ASSESSMENT & PLAN NOTE
- Former tobacco abuse with history of COPD; concern for volume overload in ICU setting as well and underwent some diuresis. Respiratory status improved now; on 05/10 did have significant respiratory demand prompting CTA; negative for thromboembolic process.  - Continuing albuterol-ipratropium 2.5-0.5mg inhaled PRN   - now on RA  -Last day of moxi on 5/20.

## 2018-05-23 NOTE — PLAN OF CARE
Problem: Patient Care Overview  Goal: Plan of Care Review          Outcome: Ongoing (interventions implemented as appropriate)  Patient alert, oriented to person, place and situation. Pleasant and cooperative with care. Vitals WNL. Turns independently in bed. Patient with rash to groin, triad cream applied x 2 overnight, no other signs of skin breakdown. Incontinent bowel/bladder, condom catheter in use. Patient slept for most of the night, no complaints.

## 2018-05-23 NOTE — PLAN OF CARE
12:08 PM  SW faxed referrals packets via  to SNF Facilities, awaiting response.      DENISE Maria  [facility]

## 2018-05-23 NOTE — PROGRESS NOTES
[facility] Hospital Medicine  Progress Note    Patient Name: Ryder Boo  MRN: [id number]  Patient Class: IP- Inpatient   Admission Date: 5/6/2018  Length of Stay: 17 days  Attending Physician: Shaylee Chao MD  Primary Care Provider: Swapnil Heart MD    [facility] Medicine Team: [facility]OSP MED 4 Eladia Barbour MD    Subjective:     Principal Problem:Aspiration pneumonia    HPI:  The patient is a 62 y/o M new to our system, with HTN, COPD and alcoholism, who is transfer from [facility] ICU to [facility] on mechanical ventilation.     The patient lives with his brother across the street from his sister. He was found unresponsive in a chair with small amount of blood in his mouth by his nephew and taken to the hospital by EMS on 5/3. Patient drinks every day and has been having frequent falls recently. Per sister he has quit smoking 15 years ago.  In the ED at OSH he was found to be hypoxic and hypotensive, with undetectable EtOH levels, leukocytosis (24K) and hyponatremia (119) in the labs. he received narcan x1, IV fluids, started on metronidazole and ceftriaxone (also x1 of zosyn, azithromycin, vancomycin, ceftaroline) for aspiration pneumonia, and was put on 15L of Oxygen with non-rebreather, which improved his mental status and BP, however on 4/5 upon weaning off of oxygen he developed hypoxic hypercapnic respiratory failure and subsequent acidosis (pH 7.10), therefore he was intubated and transferred to the ICU. Patient arrived to [facility] on dopamine gtt which per notes was started to maintain BP in the setting of hyponatremia.     Hospital Course:  5/6: upon arrival, patient was taken off of dopamine gtt and received 1.5L IV fluids to maintain MAPs. Continued on vanc/zosyn/azithromycin. Hyponatremia and ROHITH continued to improved with IVF. He was extubated in early afternoon and maintained normal O2 saturations on 3-4L oxygen on NC.   5/7: early morning he was noticed to be rattling while  breathing by the nurse. Upon examination he had increased work of breathing with worsening of bilateral rhonchi. Repeat CXR revealed interval worsening of the patchy infiltrations. Received x1 furosemide 60 mg IV and transitioned to high flow oxygen for possible development of ARDS, he is currently maintaining normal saturations, will keep on intubation watch.  5/8: several episodes of desaturation down to 70% overnight and early morning, that responded well to increasing oxygen to 100%, currently on 35L/50%, mildly drowsy due to precedex. Responded well to diuretics, net -2.8L. Will discontinue precedex and will have speech evaluate his swallowing.  5/9: Uneventful night off precedex. Remains confused, oriented to person and place. Oxygen requirements greatly improved 15L/50% with plan to wean to low-flow NC today. Diuresed -2.9L yesterday and discontinued lasix. Discontinued azithromycin and continued on zosyn. Failed speech eval by SLP and remains NPO  5/11-5/12 patient had NG tube placed, removed it and re-ordered on gentle tube feeds. On high o2 support at night as patient cannot have bipap due to NG.  05/14/2018 -Patient passed swallow eval to full liquids. He is now sating 100 % RA family updated awaiting rehab evaluation   05/17/2018 Patient was desating in the morning to 85-88 but he was alert oriented and no complaints gave him a breathing treatment   05/18/2018 awaiting placement  05/19/2018 CT, requested by rehab to r/o stroke, shows mild signs of possible NPH. Will order f/u o/p neuro. Patient reports x3 BMs, will see if continues. Will complete 1 more day of moxi.   05/20/2018 no acute events overnight patient on last day of moxi    Interval History: Patient with no acute events overnight, Patient resting comfortably.  still awaiting placement     Review of Systems   Constitutional: Negative for activity change, chills, fatigue and fever.   HENT: Negative for drooling, hearing loss, trouble swallowing  and voice change.    Eyes: Negative for pain and visual disturbance.   Respiratory: Negative for cough, shortness of breath and wheezing.    Cardiovascular: Negative for chest pain and palpitations.   Gastrointestinal: Negative for abdominal pain, nausea and vomiting.   Genitourinary: Negative for difficulty urinating and flank pain.   Musculoskeletal: Positive for back pain and gait problem. Negative for arthralgias, myalgias and neck pain.   Skin: Negative for rash and wound.   Neurological: Negative for dizziness, weakness, numbness and headaches.   Psychiatric/Behavioral: Negative for agitation, confusion and hallucinations. The patient is not nervous/anxious.      Objective:     Vital Signs (Most Recent):  Temp: 98.9 °F (37.2 °C) (05/23/18 0716)  Pulse: (!) 120 (05/23/18 0716)  Resp: 18 (05/23/18 0716)  BP: 132/83 (05/23/18 0716)  SpO2: (!) 90 % (05/23/18 0716) Vital Signs (24h Range):  Temp:  [97.3 °F (36.3 °C)-98.9 °F (37.2 °C)] 98.9 °F (37.2 °C)  Pulse:  [] 120  Resp:  [18-19] 18  SpO2:  [89 %-98 %] 90 %  BP: (104-133)/(70-84) 132/83     Weight: 74.4 kg (164 lb 0.4 oz)  Body mass index is 26.47 kg/m².    Intake/Output Summary (Last 24 hours) at 05/23/18 0728  Last data filed at 05/23/18 0600   Gross per 24 hour   Intake              120 ml   Output             2200 ml   Net            -2080 ml      Physical Exam   Constitutional: He appears well-developed and well-nourished. No distress.   HENT:   Head: Normocephalic and atraumatic.   Right Ear: External ear normal.   Left Ear: External ear normal.   Nose: Nose normal.   Eyes: Right eye exhibits no discharge. Left eye exhibits no discharge. No scleral icterus.   Neck: Normal range of motion.   Cardiovascular: Normal rate, regular rhythm and intact distal pulses.    Pulmonary/Chest: Effort normal. No respiratory distress. He has no wheezes.   rhonci   Abdominal: Soft. He exhibits no distension. There is no tenderness.   Musculoskeletal: Normal range of  "motion. He exhibits no edema or tenderness.   Neurological: He is alert. He exhibits normal muscle tone.   Skin: Skin is warm and dry. No rash noted.   Psychiatric: He has a normal mood and affect. His behavior is normal. His speech is not slurred. Cognition and memory are not impaired.   Vitals reviewed.      Significant Labs:   CBC:     Recent Labs  Lab 05/22/18  0618   WBC 6.28   HGB 9.1*   HCT 28.9*        CMP:     Recent Labs  Lab 05/22/18  0618 05/23/18  0417    137   K 3.7 3.8    103   CO2 25 21*   GLU 89 89   BUN 14 18   CREATININE 0.8 0.9   CALCIUM 8.6* 8.8   PROT 6.9  --    ALBUMIN 2.3*  --    BILITOT 0.4  --    ALKPHOS 43*  --    AST 13  --    ALT 13  --    ANIONGAP 10 13   EGFRNONAA >60.0 >60.0     All pertinent labs within the past 24 hours have been reviewed.    Significant Imaging: I have reviewed and interpreted all pertinent imaging results/findings within the past 24 hours.    Assessment/Plan:      * Aspiration pneumonia    - At OSH had significant leukocytosis with WBCs to 38K.  - CXR with bilateral multifocal infiltration.  - Suspect aspiration secondary to AMS.  -Completed course of Moxi. Last day was 5/20.         Moderate malnutrition    - continue boost and full diet           Anemia    - Hgb stable; some concern for Hgb drop as outpatient. FOBT performed and positive; no gross evidence of bleed.          Fibrosis of lung    CT 5/7  "traction effects in the inferior aspect of the right middle lobe and inferior aspect of the basal segments of the right lower lobe suggesting fibrosis and consequent scarring"  Also  There are bilateral pleural calcifications peripherally and over the diaphragm consistent with asbestos related pleural disease.  Patient needs pulmonary f/u        Acute respiratory failure with hypoxia and hypercapnia    - Former tobacco abuse with history of COPD; concern for volume overload in ICU setting as well and underwent some diuresis. Respiratory status " improved now; on 05/10 did have significant respiratory demand prompting CTA; negative for thromboembolic process.  - Continuing albuterol-ipratropium 2.5-0.5mg inhaled PRN   - now on RA  -Last day of moxi on 5/20.           Alcoholism /alcohol abuse    - History of alcohol abuse; continuing folic acid, thiamine daily.          VTE Risk Mitigation         Ordered     enoxaparin injection 40 mg  Daily      05/06/18 1016     Place sequential compression device  Until discontinued      05/06/18 0113     IP VTE LOW RISK PATIENT  Once      05/06/18 0113              Eladia Barbour MD  Department of Hospital Medicine   [facility]

## 2018-05-23 NOTE — PLAN OF CARE
Problem: Physical Therapy Goal  Goal: Physical Therapy Goal  Goals to be met by: 2018    Patient will increase functional independence with mobility by performin. Revised: Supine to sit with CGA  2. Revised: Sit to supine with CGA  3. Revised: Sit-stand transfer with CGA using LRAD  4.Revised: Bed-chair transfer with Min Assist using LRAD MET  5. Gait  x 20 feet with Moderate Assistance using LRAD or no AD - not met  6. Lower extremity exercise program x20 reps per handout, with supervision - met         Outcome: Ongoing (interventions implemented as appropriate)  Goals remain appropriate.    Ana Stiles, PT, DPT  2018

## 2018-05-23 NOTE — SUBJECTIVE & OBJECTIVE
Interval History: Patient with no acute events overnight, Patient resting comfortably.  still awaiting placement     Review of Systems   Constitutional: Negative for activity change, chills, fatigue and fever.   HENT: Negative for drooling, hearing loss, trouble swallowing and voice change.    Eyes: Negative for pain and visual disturbance.   Respiratory: Negative for cough, shortness of breath and wheezing.    Cardiovascular: Negative for chest pain and palpitations.   Gastrointestinal: Negative for abdominal pain, nausea and vomiting.   Genitourinary: Negative for difficulty urinating and flank pain.   Musculoskeletal: Positive for back pain and gait problem. Negative for arthralgias, myalgias and neck pain.   Skin: Negative for rash and wound.   Neurological: Negative for dizziness, weakness, numbness and headaches.   Psychiatric/Behavioral: Negative for agitation, confusion and hallucinations. The patient is not nervous/anxious.      Objective:     Vital Signs (Most Recent):  Temp: 98.9 °F (37.2 °C) (05/23/18 0716)  Pulse: (!) 120 (05/23/18 0716)  Resp: 18 (05/23/18 0716)  BP: 132/83 (05/23/18 0716)  SpO2: (!) 90 % (05/23/18 0716) Vital Signs (24h Range):  Temp:  [97.3 °F (36.3 °C)-98.9 °F (37.2 °C)] 98.9 °F (37.2 °C)  Pulse:  [] 120  Resp:  [18-19] 18  SpO2:  [89 %-98 %] 90 %  BP: (104-133)/(70-84) 132/83     Weight: 74.4 kg (164 lb 0.4 oz)  Body mass index is 26.47 kg/m².    Intake/Output Summary (Last 24 hours) at 05/23/18 0728  Last data filed at 05/23/18 0600   Gross per 24 hour   Intake              120 ml   Output             2200 ml   Net            -2080 ml      Physical Exam   Constitutional: He appears well-developed and well-nourished. No distress.   HENT:   Head: Normocephalic and atraumatic.   Right Ear: External ear normal.   Left Ear: External ear normal.   Nose: Nose normal.   Eyes: Right eye exhibits no discharge. Left eye exhibits no discharge. No scleral icterus.   Neck: Normal range of  motion.   Cardiovascular: Normal rate, regular rhythm and intact distal pulses.    Pulmonary/Chest: Effort normal. No respiratory distress. He has no wheezes.   rhonci   Abdominal: Soft. He exhibits no distension. There is no tenderness.   Musculoskeletal: Normal range of motion. He exhibits no edema or tenderness.   Neurological: He is alert. He exhibits normal muscle tone.   Skin: Skin is warm and dry. No rash noted.   Psychiatric: He has a normal mood and affect. His behavior is normal. His speech is not slurred. Cognition and memory are not impaired.   Vitals reviewed.      Significant Labs:   CBC:     Recent Labs  Lab 05/22/18 0618   WBC 6.28   HGB 9.1*   HCT 28.9*        CMP:     Recent Labs  Lab 05/22/18 0618 05/23/18  0417    137   K 3.7 3.8    103   CO2 25 21*   GLU 89 89   BUN 14 18   CREATININE 0.8 0.9   CALCIUM 8.6* 8.8   PROT 6.9  --    ALBUMIN 2.3*  --    BILITOT 0.4  --    ALKPHOS 43*  --    AST 13  --    ALT 13  --    ANIONGAP 10 13   EGFRNONAA >60.0 >60.0     All pertinent labs within the past 24 hours have been reviewed.    Significant Imaging: I have reviewed and interpreted all pertinent imaging results/findings within the past 24 hours.

## 2018-05-23 NOTE — PT/OT/SLP PROGRESS
Occupational Therapy   Treatment    Name: Ryder Boo  MRN: [id number]  Admitting Diagnosis:  Aspiration pneumonia       Recommendations:     Discharge Recommendations: nursing facility, skilled  Discharge Equipment Recommendations:  bedside commode, walker, rolling, shower chair  Barriers to discharge:  Decreased caregiver support    Subjective     Communicated with: RN prior to session.  Pain/Comfort:  · Pain Rating 1: 0/10  · Pain Rating Post-Intervention 1: 0/10    Patients cultural, spiritual, Yazidism conflicts given the current situation: none stated    Objective:     Patient found with:      General Precautions: Standard, fall, aspiration   Orthopedic Precautions:N/A   Braces: N/A     Occupational Performance:    Bed Mobility:    · Pt received UIC.    Functional Mobility/Transfers:  · Patient completed Sit <> Stand Transfer with contact guard assistance  with  rolling walker   · Functional Mobility: did not take steps on this date. Completed static stand when performing ADLs in front of bedside table.     Activities of Daily Living:  · Grooming: contact guard assistance oral hygiene, facial hygiene, and combing hair Began initially in standing, but completed in sitting.      Patient left up in chair with all lines intact and call button in reach    [facility] 6 Click:  [facility] Total Score: 21    Treatment & Education:  Pt completed static stand while performing ADLs in front of bedside table.   Pt educated on POC.    Education:    Assessment:     Ryder Boo is a 63 y.o. male with a medical diagnosis of Aspiration pneumonia.  He presents with impairments listed below. Pt did well to tolerate session. Pt displayed decreased endurance when performing ADLs in standing. Pt progressing towards goals. Pt would benefit from skilled OT services to improve independence and overall occupational functioning.      Performance deficits affecting function are weakness, impaired endurance, impaired self care skills, impaired  functional mobilty, gait instability, impaired balance, decreased lower extremity function, impaired cognition, decreased safety awareness, impaired cardiopulmonary response to activity.      Rehab Prognosis:  good; patient would benefit from acute skilled OT services to address these deficits and reach maximum level of function.       Plan:     Patient to be seen 4 x/week to address the above listed problems via self-care/home management, therapeutic activities, therapeutic exercises  · Plan of Care Expires: 06/07/18  · Plan of Care Reviewed with: patient    This Plan of care has been discussed with the patient who was involved in its development and understands and is in agreement with the identified goals and treatment plan    GOALS:    Occupational Therapy Goals        Problem: Occupational Therapy Goal    Goal Priority Disciplines Outcome Interventions   Occupational Therapy Goal     OT, PT/OT Ongoing (interventions implemented as appropriate)    Description:  Goals to be met by: 5/22/18     Patient will increase functional independence with ADLs by performing:    Feeding with Supervision.  UE Dressing with Minimal Assistance. MET 5/16  LE Dressing with Moderate Assistance. MET 5/16  Grooming while standing with Contact Guard Assistance.  Toileting from bedside commode with Moderate Assistance for hygiene and clothing management.   Toilet transfer to bedside commode with Minimal Assistance .                        Time Tracking:     OT Date of Treatment: 05/23/18  OT Start Time: 1119  OT Stop Time: 1134  OT Total Time (min): 15 min    Billable Minutes:Self Care/Home Management 15 anisa Lopez OT  5/23/2018

## 2018-05-23 NOTE — PLAN OF CARE
MARK received update from MD that P2P for rehab denial was upheld therefore CM/SW team will move forward w/ SNF referrals. MARK forwarded the [facility] list (in pt's local area) from latosha Pryor/elizabeth planner to [facility] SW for additional referrals to be sent.

## 2018-05-23 NOTE — PT/OT/SLP PROGRESS
"Speech Language Pathology Treatment    Patient Name:  Ryder Boo   MRN:  [id number]  Admitting Diagnosis: Aspiration pneumonia    Recommendations:                 General Recommendations:  Dysphagia therapy and Cognitive-linguistic therapy  Diet recommendations:  Dental Soft, Liquid Diet Level: Nectar Thick   Aspiration Precautions: 1 bite/sip at a time, Feed only when awake/alert, HOB to 90 degrees and Small bites/sips   General Precautions: Standard, fall, aspiration  Communication strategies:  provide increased time to answer    Subjective     Awake/alert  " I had some trouble yesterday, but I didn't feel well." Re difficulty with thin liquids    Pain/Comfort:  · Pain Rating 1: 0/10  · Pain Rating Post-Intervention 1: 0/10    Objective:     Has the patient been evaluated by SLP for swallowing?   Yes  Keep patient NPO? No   Current Respiratory Status: room air      Pt upright in bed upon entry. Pt with audible chest congestion noted throughout session. Thin liquids tolerated x6 oz without overt s/s of aspiration and timely oral transit time. Complex commands followed with 100% accy provided occ cues. No word finding deficits noted throughout session. PT entered the room med session. Pt able to recall safety strategies to complete transfers safely and how to get in touch with NSG with 100% accy provided min cues. Prior to transfer pt cleaned up by PT/SLP. Pt stated how to call NSG, but when confronted on why he didn't after using restroom in bed pt stated " I don't know." Continue POC at this time.     Assessment:     Ryder Boo is a 63 y.o. male with an SLP diagnosis of Dysphagia and Cognitive-Linguistic Impairment.   Goals:    SLP Goals        Problem: SLP Goal    Goal Priority Disciplines Outcome   SLP Goal     SLP Ongoing (interventions implemented as appropriate)   Description:  Updated Short Term Goals  Goals expected to be met by 6/6  1. Patient will tolerate dental soft solids with no overt s/s of " aspiration.   2. Patient will tolerate NECTAR thick liquids with no overt s/s of aspiration.   4. Pt will complete functional delayed memory task with 80% accy and mod cues  5. Pt will follow complex commands with 80% accy and min cues  6. Pt will complete problem solving tasks with 90% accy and min cues  7. Pt will complete complex word finding task with 80% accy min cues    Goals to be met 5/15  1. Pt will participate in ongoing swallow eval   2. Pt will tolerate full liquids PO diet with no overt s/s of aspiration.   MET  3. Pt will tolerate dental soft diet/thin liquids without overt s/s of aspiration.  -modified  4. Pt will complete functional memory task with 80% accy and mod cues -ongoing  5. Pt will follow complex commands with 70% accy and mod cues. -Met; modified  6. Pt will complete problem solving tasks with 70% accy and min cues. -met, modified  7. Pt will complete word finding task with 80% accy and occasional cues.  -met; modified                    Plan:     · Patient to be seen:  4 x/week   · Plan of Care reviewed with:  patient   · SLP Follow-Up:  Yes       Discharge recommendations:  rehabilitation facility       Time Tracking:     SLP Treatment Date:   05/23/18  Speech Start Time:  1036  Speech Stop Time:  1052     Speech Total Time (min):  16 min    Billable Minutes: Speech Therapy Individual 8 and Treatment Swallowing Dysfunction 8    Marley Kimble CCC-SLP  05/23/2018

## 2018-05-23 NOTE — PT/OT/SLP PROGRESS
"Physical Therapy Treatment    Patient Name:  Ryder Boo   MRN:  [id number]    Recommendations:     Discharge Recommendations:  rehabilitation facility   Discharge Equipment Recommendations: bedside commode, shower chair, walker, rolling   Barriers to discharge: Decreased caregiver support    Assessment:     Ryder Boo is a 63 y.o. male admitted with a medical diagnosis of Aspiration pneumonia.  He presents with the following impairments/functional limitations:  weakness, impaired endurance, impaired self care skills, gait instability, impaired balance, decreased lower extremity function, decreased safety awareness, impaired cardiopulmonary response to activity. During today's session, pt with improved seated and standing balance with ability to progress with gait distance req less assistance. Pt remains with disorientation during session but easily redirected. Remains motivated to improve overall quality of movement and (I) with activity.    Rehab Prognosis:  Good; patient would benefit from acute skilled PT services to address these deficits and reach maximum level of function.      Recent Surgery: * No surgery found *      Plan:     During this hospitalization, patient to be seen 4 x/week to address the above listed problems via gait training, therapeutic activities, therapeutic exercises, neuromuscular re-education  · Plan of Care Expires:  06/06/18   Plan of Care Reviewed with: patient    Subjective     Communicated with nsg prior to session.  Patient found supine in bed  upon PT entry to room, agreeable to treatment.      Chief Complaint: SOB throughout session  Patient comments/goals: "How long have I been here?" per pt during session.   Pain/Comfort:  · Pain Rating 1: 0/10    Patients cultural, spiritual, Restoration conflicts given the current situation: none stated    Objective:     Patient found with: hess catheter, oxygen     General Precautions: Standard, fall, aspiration   Orthopedic " Precautions:N/A   Braces: N/A     Functional Mobility  Bed Mobility  Scooting: contact guard assistance  Supine to Sit: minimum assistance   Rolling in (B) directions: CGA with usage of HR   Transfers Sit to Stand:  minimum assistance  And moderate assistance with rolling walker for 2 trials  - MIN A from bedside, MOD A from bedside chair     Gait Gait Distance: 8 steps FWD with RW  Assistance Level: minimum assistance  Description: slowed anya with decr WS onto R LE, incr posterior trunk lean demonstrated with SOB occurring towards end of trial req cessation of activity         Balance   Static Sitting stand by assistance   Dynamic Sitting stand by assistance   Static Standing contact guard assistance   Dynamic Standing minimum assistance   '      AM-PAC 6 CLICK MOBILITY  Turning over in bed (including adjusting bedclothes, sheets and blankets)?: 3  Sitting down on and standing up from a chair with arms (e.g., wheelchair, bedside commode, etc.): 3  Moving from lying on back to sitting on the side of the bed?: 3  Moving to and from a bed to a chair (including a wheelchair)?: 2  Need to walk in hospital room?: 2  Climbing 3-5 steps with a railing?: 1  Total Score: 14       Therapeutic Activities and Exercises:   THERAPEUTIC EXERCISE  Pt performed therapeutic exercise seated for 20 reps (B) LE   - strengthening: hip flexion, LAQ   - PT instructed pt on performing spirometer exercise for every hour to assist with diaphragmatic breathing.     *PT performed perianal cleaning prior to OOB activity due to pt having bowel movement prior to session. Notified nsg of pt condom catheter being dislodged during session. Urinal provided.    EDUCATION  Pt educated pt on incr OOB activity including sitting in bedside chair majority of day, utilizing bedside commode for toileting needs, and amb with nsg and PCT 1 person  Educated pt on being appropriate to transfer and ambulate with nsg and PCT with 1 person assistance.  Updated  white board with appropriate PT mobility information for medical team notification  Pt verbalized agreement.       Patient left up in chair with all lines intact and call button in reach..    GOALS:    Physical Therapy Goals        Problem: Physical Therapy Goal    Goal Priority Disciplines Outcome Goal Variances Interventions   Physical Therapy Goal     PT/OT, PT Ongoing (interventions implemented as appropriate)     Description:  Goals to be met by: 2018    Patient will increase functional independence with mobility by performin. Revised: Supine to sit with CGA  2. Revised: Sit to supine with CGA  3. Revised: Sit-stand transfer with CGA using LRAD  4.Revised: Bed-chair transfer with Min Assist using LRAD MET  5. Gait  x 20 feet with Moderate Assistance using LRAD or no AD - not met  6. Lower extremity exercise program x20 reps per handout, with supervision - met                           Time Tracking:     PT Received On: 18  PT Start Time: 1040     PT Stop Time: 1109  PT Total Time (min): 29 min     Billable Minutes: Gait Training 14 and Therapeutic Activity 15    Treatment Type: Treatment  PT/PTA: PT     PTA Visit Number: 2     Ana Stiles, PT  2018

## 2018-05-23 NOTE — PLAN OF CARE
Problem: SLP Goal  Goal: SLP Goal  Updated Short Term Goals  Goals expected to be met by 6/6  1. Patient will tolerate dental soft solids with no overt s/s of aspiration.   2. Patient will tolerate NECTAR thick liquids with no overt s/s of aspiration.   4. Pt will complete functional delayed memory task with 80% accy and mod cues  5. Pt will follow complex commands with 80% accy and min cues  6. Pt will complete problem solving tasks with 90% accy and min cues  7. Pt will complete complex word finding task with 80% accy min cues    Goals to be met 5/15  1. Pt will participate in ongoing swallow eval   2. Pt will tolerate full liquids PO diet with no overt s/s of aspiration.   MET  3. Pt will tolerate dental soft diet/thin liquids without overt s/s of aspiration.  -modified  4. Pt will complete functional memory task with 80% accy and mod cues -ongoing  5. Pt will follow complex commands with 70% accy and mod cues. -Met; modified  6. Pt will complete problem solving tasks with 70% accy and min cues. -met, modified  7. Pt will complete word finding task with 80% accy and occasional cues.  -met; modified   Continue POC at this time, all goals remain appropriate.   Marley Kimble CCC-SLP  5/23/2018

## 2018-05-24 PROBLEM — R26.9 GAIT DISTURBANCE: Status: ACTIVE | Noted: 2018-05-24

## 2018-05-24 PROCEDURE — 92507 TX SP LANG VOICE COMM INDIV: CPT

## 2018-05-24 PROCEDURE — 92526 ORAL FUNCTION THERAPY: CPT

## 2018-05-24 PROCEDURE — 94761 N-INVAS EAR/PLS OXIMETRY MLT: CPT

## 2018-05-24 PROCEDURE — 94799 UNLISTED PULMONARY SVC/PX: CPT

## 2018-05-24 PROCEDURE — 99232 SBSQ HOSP IP/OBS MODERATE 35: CPT | Mod: ,,, | Performed by: HOSPITALIST

## 2018-05-24 PROCEDURE — 20600001 HC STEP DOWN PRIVATE ROOM

## 2018-05-24 PROCEDURE — 97803 MED NUTRITION INDIV SUBSEQ: CPT

## 2018-05-24 PROCEDURE — 94664 DEMO&/EVAL PT USE INHALER: CPT

## 2018-05-24 PROCEDURE — 25000003 PHARM REV CODE 250: Performed by: INTERNAL MEDICINE

## 2018-05-24 PROCEDURE — 63600175 PHARM REV CODE 636 W HCPCS: Performed by: STUDENT IN AN ORGANIZED HEALTH CARE EDUCATION/TRAINING PROGRAM

## 2018-05-24 PROCEDURE — 99900035 HC TECH TIME PER 15 MIN (STAT)

## 2018-05-24 RX ORDER — IPRATROPIUM BROMIDE AND ALBUTEROL SULFATE 2.5; .5 MG/3ML; MG/3ML
3 SOLUTION RESPIRATORY (INHALATION) EVERY 4 HOURS PRN
Qty: 1 BOX | Refills: 0
Start: 2018-05-24 | End: 2019-05-24

## 2018-05-24 RX ORDER — FOLIC ACID 1 MG/1
1 TABLET ORAL DAILY
Qty: 90 TABLET | Refills: 3 | Status: SHIPPED | OUTPATIENT
Start: 2018-05-24 | End: 2019-05-24

## 2018-05-24 RX ORDER — LANOLIN ALCOHOL/MO/W.PET/CERES
100 CREAM (GRAM) TOPICAL DAILY
COMMUNITY
Start: 2018-05-24

## 2018-05-24 RX ADMIN — THIAMINE HCL TAB 100 MG 100 MG: 100 TAB at 09:05

## 2018-05-24 RX ADMIN — FOLIC ACID 1 MG: 1 TABLET ORAL at 09:05

## 2018-05-24 RX ADMIN — ENOXAPARIN SODIUM 40 MG: 100 INJECTION SUBCUTANEOUS at 04:05

## 2018-05-24 NOTE — ASSESSMENT & PLAN NOTE
CTH 5/18 with Moderate diffuse dilation of the ventricular system, possibly normal pressure hydrocephalus.  Will need outpatient neurology f/u

## 2018-05-24 NOTE — PLAN OF CARE
Problem: Patient Care Overview  Goal: Plan of Care Review  Dx: Pneumonia   Hx: HTN, COPD and alcoholism, who is a transfer from [facility] ICU to [facility] on mechanical ventilation after being found unresponsive at home    4/5: Transferred to [facility] on mechanical ventilation. Dopamine gtt off.   4/6: Pt extubated  5/7: Echo 50-55%         RN  MAP>65    *Pt likes [employer] channel 3        Outcome: Ongoing (interventions implemented as appropriate)  Pt lying in bed asleep easily aroused no c/o pain at this time, pulse ox 95% on ra aaox 3, suction at bedside pt knows how to use suction .

## 2018-05-24 NOTE — NURSING
No acute changes over shift. VSS through out shift. Pt tolerating regular mechanical soft diet well with no trouble. Wound care provided d/t pt has some redness in groin and in between BLE's in groin area. Cleansed and barrier cream applied. Awaiting SNF placement. Bedside table and call light is within reach. WCTM.

## 2018-05-24 NOTE — PT/OT/SLP PROGRESS
"Speech Language Pathology Treatment    Patient Name:  Ryder Boo   MRN:  [id number]  Admitting Diagnosis: Aspiration pneumonia    Recommendations:                 General Recommendations:  Dysphagia therapy and Cognitive-linguistic therapy  Diet recommendations:  Dental Soft, Liquid Diet Level: Thin   Aspiration Precautions: Standard aspiration precautions   General Precautions: Standard, aspiration, fall  Communication strategies:  None    Subjective     Awake/alert  " I cough a lot when I lay down."     Pain/Comfort:  Pain Rating 1: 0/10  Pain Rating Post-Intervention 1: 0/10    Objective:     Has the patient been evaluated by SLP for swallowing?   Yes  Keep patient NPO? No   Current Respiratory Status: room air      Pt upright in bed eating lunch upon entry. Pt tolerated chicken and rice x6 and thin liquids via straw x5 with timely swallow initiation. Coughing noted x2 while pt talking with food in mouth. SLP reviewed swallow precautions with emphasis on small bites/sips and not talking while eating. Pt verbalized understanding, reinforcement recommended. Mental manipulation task attempted and completed with 2/2 accy provided max cues. Compare/contrast task completed with 100% accy provided occasional cues. Pt recalled 3/3 words after a 3 minute delay provided mod cues. Continue POC at this time.     Assessment:     Ryder Boo is a 63 y.o. male with an SLP diagnosis of Dysphagia and Cognitive-Linguistic Impairment.     Goals:    SLP Goals        Problem: SLP Goal    Goal Priority Disciplines Outcome   SLP Goal     SLP Ongoing (interventions implemented as appropriate)   Description:  Updated Short Term Goals  Goals expected to be met by 6/6  1. Patient will tolerate dental soft solids/thin liquids with no overt s/s of aspiration.   2. Patient will tolerate NECTAR thick liquids with no overt s/s of aspiration.  MET  4. Pt will complete functional delayed memory task with 80% accy and mod cues  5. Pt will " follow complex commands with 80% accy and min cues  6. Pt will complete problem solving tasks with 90% accy and min cues  7. Pt will complete complex word finding task with 80% accy min cues    Goals to be met 5/15  1. Pt will participate in ongoing swallow eval   2. Pt will tolerate full liquids PO diet with no overt s/s of aspiration.   MET  3. Pt will tolerate dental soft diet/thin liquids without overt s/s of aspiration.  -modified  4. Pt will complete functional memory task with 80% accy and mod cues -ongoing  5. Pt will follow complex commands with 70% accy and mod cues. -Met; modified  6. Pt will complete problem solving tasks with 70% accy and min cues. -met, modified  7. Pt will complete word finding task with 80% accy and occasional cues.  -met; modified                     Plan:     · Patient to be seen:  4 x/week   · Plan of Care reviewed with:  patient   · SLP Follow-Up:  Yes       Discharge recommendations:  nursing facility, skilled       Time Tracking:     SLP Treatment Date:   05/24/18  Speech Start Time:  1213  Speech Stop Time:  1229     Speech Total Time (min):  16 min    Billable Minutes: Speech Therapy Individual 8 and Treatment Swallowing Dysfunction 8    Marley Kimble CCC-SLP  05/24/2018

## 2018-05-24 NOTE — SUBJECTIVE & OBJECTIVE
Interval History: Patient with no acute events overnight, Patient resting comfortably.  still awaiting placement     Review of Systems   Constitutional: Negative for activity change, chills, fatigue and fever.   HENT: Negative for drooling, hearing loss, trouble swallowing and voice change.    Eyes: Negative for pain and visual disturbance.   Respiratory: Negative for cough, shortness of breath and wheezing.    Cardiovascular: Negative for chest pain and palpitations.   Gastrointestinal: Negative for abdominal pain, nausea and vomiting.   Genitourinary: Negative for difficulty urinating and flank pain.   Musculoskeletal: Positive for gait problem. Negative for arthralgias, back pain, myalgias and neck pain.   Skin: Negative for rash and wound.   Neurological: Negative for dizziness, weakness, numbness and headaches.   Psychiatric/Behavioral: Negative for agitation, confusion and hallucinations. The patient is not nervous/anxious.      Objective:     Vital Signs (Most Recent):  Temp: 98.6 °F (37 °C) (05/24/18 0733)  Pulse: 103 (05/24/18 0733)  Resp: 16 (05/24/18 0733)  BP: 120/76 (05/24/18 0733)  SpO2: 95 % (05/24/18 0733) Vital Signs (24h Range):  Temp:  [98.1 °F (36.7 °C)-99 °F (37.2 °C)] 98.6 °F (37 °C)  Pulse:  [] 103  Resp:  [16-24] 16  SpO2:  [92 %-98 %] 95 %  BP: (114-155)/(68-95) 120/76     Weight: 76.3 kg (168 lb 3.4 oz)  Body mass index is 27.15 kg/m².    Intake/Output Summary (Last 24 hours) at 05/24/18 7288  Last data filed at 05/24/18 0624   Gross per 24 hour   Intake              120 ml   Output              300 ml   Net             -180 ml      Physical Exam   Constitutional: He appears well-developed and well-nourished. No distress.   HENT:   Head: Normocephalic and atraumatic.   Right Ear: External ear normal.   Left Ear: External ear normal.   Nose: Nose normal.   Eyes: Right eye exhibits no discharge. Left eye exhibits no discharge. No scleral icterus.   Neck: Normal range of motion.    Cardiovascular: Normal rate, regular rhythm and intact distal pulses.    Pulmonary/Chest: Effort normal. No respiratory distress. He has no wheezes.   Diffuse rhonci   Abdominal: Soft. He exhibits no distension. There is no tenderness.   Musculoskeletal: Normal range of motion. He exhibits no edema or tenderness.   Neurological: He is alert. He exhibits normal muscle tone.   Skin: Skin is warm and dry. No rash noted.   Psychiatric: He has a normal mood and affect. His behavior is normal. His speech is not slurred. Cognition and memory are not impaired.   Vitals reviewed.      Significant Labs:   CBC:   No results for input(s): WBC, HGB, HCT, PLT in the last 48 hours.  CMP:     Recent Labs  Lab 05/23/18  0417      K 3.8      CO2 21*   GLU 89   BUN 18   CREATININE 0.9   CALCIUM 8.8   ANIONGAP 13   EGFRNONAA >60.0     All pertinent labs within the past 24 hours have been reviewed.    Significant Imaging: I have reviewed and interpreted all pertinent imaging results/findings within the past 24 hours.

## 2018-05-24 NOTE — PLAN OF CARE
[facility]     Department of Hospital Medicine     [address] [place] [address]     [phone] after hours  [phone] fax       NURSING HOME ORDERS    05/26/2018    Admit to Nursing Home:      Skilled Bed                                              Diagnoses:  Active Hospital Problems    Diagnosis  POA    *Aspiration pneumonia [J69.0]  Yes    Gait disturbance [R26.9]  Unknown    Impaired mobility and ADLs [Z74.09]  Unknown    Pressure injury of skin and subcutaneous tissue, stage 3 [L89.93]  Yes    Moderate malnutrition [E44.0]  Yes    Anemia [D64.9]  Yes    Acute respiratory failure with hypoxia and hypercapnia [J96.01, J96.02]  Yes    Fibrosis of lung [J84.10]  Yes    Alcoholism /alcohol abuse [F10.20]  Yes      Resolved Hospital Problems    Diagnosis Date Resolved POA    Hypernatremia [E87.0] 05/21/2018 Yes    Encephalopathy, metabolic [G93.41] 05/21/2018 Yes    Hypomagnesemia [E83.42] 05/22/2018 No    Hyponatremia [E87.1] 05/08/2018 Yes    ROHITH (acute kidney injury) [N17.9] 05/21/2018 Yes    Sepsis [A41.9] 05/21/2018 Yes       Patient is homebound due to:  Aspiration pneumonia    Allergies:  Review of patient's allergies indicates:   Allergen Reactions    Pneumococcal 23-whitney ps vaccine      Pt confused, cannot recall reaction symptoms       Vitals:   Every shift (Skilled Nursing patients)    Diet: Mechanical soft with thin liquids + Boost with meals    Acitivities:    - Up in a chair each morning as tolerated   - May use walker, cane, or self-propelled wheelchair       LABS:  Per facility protocol     CMP, CBC each month for 3 months   Pre-albumin each month for 3 months   TSH every year     Nursing Precautions:     - Aspiration precautions:             - Feed only when awake and alert; 1 bite/sip at a time            - Upright 90 degrees before and during and after meals             - Suction at bedside          - Fall precautions per  nursing home protocol    - Decubitus precautions:        -  for positioning   - Pressure reducing foam mattress   - Turn patient every two hours. Use wedge pillows to anchor patient    CONSULTS:        Physical Therapy to evaluate and treat     Occupational Therapy to evaluate and treat     Speech Therapy  to evaluate and treat     Nutrition to evaluate and recommend diet     Psychiatry to evaluate and follow patients for delirium    MISCELLANEOUS CARE:     Routine Skin for Bedridden Patients:  Apply moisture barrier cream to all    skin folds and wet areas in perineal area daily and after baths and                           all bowel movements.    Wound Care   Recommend applying triad barrier cream qshift and as needed.   Turn every 2hrs. Low airloss overlay ordered for patient. Nursing to continue care.      Medications: Discontinue all previous medication orders, if any. See new list below.      Ryder Boo   Home Medication Instructions NERISSA:[id number]    Printed on:05/24/18 8615   Medication Information                      albuterol-ipratropium (DUO-NEB) 2.5 mg-0.5 mg/3 mL nebulizer solution  Take 3 mLs by nebulization every 4 (four) hours as needed for Wheezing or Shortness of Breath. Rescue             chlorthalidone (HYGROTEN) 25 MG Tab  Take 25 mg by mouth once daily.             folic acid (FOLVITE) 1 MG tablet  Take 1 tablet (1 mg total) by mouth once daily.             ibuprofen (ADVIL,MOTRIN) 200 MG tablet  Take 200 mg by mouth daily as needed (headache).             thiamine 100 MG tablet  Take 1 tablet (100 mg total) by mouth once daily.                       _________________________________  Eladia Barbour MD  05/24/2018

## 2018-05-24 NOTE — PROGRESS NOTES
[facility] Hospital Medicine  Progress Note    Patient Name: Ryder Boo  MRN: [id number]  Patient Class: IP- Inpatient   Admission Date: 5/6/2018  Length of Stay: 18 days  Attending Physician: Shaylee Chao MD  Primary Care Provider: Swapnil Heart MD    [facility] Medicine Team: [facility] HOSP MED 4 Eladia Barbour MD    Subjective:     Principal Problem:Aspiration pneumonia    HPI:  The patient is a 64 y/o M new to our system, with HTN, COPD and alcoholism, who is transfer from [facility] ICU to [facility] on mechanical ventilation.     The patient lives with his brother across the street from his sister. He was found unresponsive in a chair with small amount of blood in his mouth by his nephew and taken to the hospital by EMS on 5/3. Patient drinks every day and has been having frequent falls recently. Per sister he has quit smoking 15 years ago.  In the ED at OSH he was found to be hypoxic and hypotensive, with undetectable EtOH levels, leukocytosis (24K) and hyponatremia (119) in the labs. he received narcan x1, IV fluids, started on metronidazole and ceftriaxone (also x1 of zosyn, azithromycin, vancomycin, ceftaroline) for aspiration pneumonia, and was put on 15L of Oxygen with non-rebreather, which improved his mental status and BP, however on 4/5 upon weaning off of oxygen he developed hypoxic hypercapnic respiratory failure and subsequent acidosis (pH 7.10), therefore he was intubated and transferred to the ICU. Patient arrived to [facility] on dopamine gtt which per notes was started to maintain BP in the setting of hyponatremia.     Hospital Course:  5/6: upon arrival, patient was taken off of dopamine gtt and received 1.5L IV fluids to maintain MAPs. Continued on vanc/zosyn/azithromycin. Hyponatremia and ROHITH continued to improved with IVF. He was extubated in early afternoon and maintained normal O2 saturations on 3-4L oxygen on NC.   5/7: early morning he was noticed to be rattling while  breathing by the nurse. Upon examination he had increased work of breathing with worsening of bilateral rhonchi. Repeat CXR revealed interval worsening of the patchy infiltrations. Received x1 furosemide 60 mg IV and transitioned to high flow oxygen for possible development of ARDS, he is currently maintaining normal saturations, will keep on intubation watch.  5/8: several episodes of desaturation down to 70% overnight and early morning, that responded well to increasing oxygen to 100%, currently on 35L/50%, mildly drowsy due to precedex. Responded well to diuretics, net -2.8L. Will discontinue precedex and will have speech evaluate his swallowing.  5/9: Uneventful night off precedex. Remains confused, oriented to person and place. Oxygen requirements greatly improved 15L/50% with plan to wean to low-flow NC today. Diuresed -2.9L yesterday and discontinued lasix. Discontinued azithromycin and continued on zosyn. Failed speech eval by SLP and remains NPO  5/11-5/12 patient had NG tube placed, removed it and re-ordered on gentle tube feeds. On high o2 support at night as patient cannot have bipap due to NG.  05/14/2018 -Patient passed swallow eval to full liquids. He is now sating 100 % RA family updated awaiting rehab evaluation   05/17/2018 Patient was desating in the morning to 85-88 but he was alert oriented and no complaints gave him a breathing treatment   05/18/2018 awaiting placement  05/19/2018 CT, requested by rehab to r/o stroke, shows mild signs of possible NPH. Will order f/u o/p neuro. Patient reports x3 BMs, will see if continues. Will complete 1 more day of moxi.   05/20/2018 no acute events overnight patient on last day of moxi    Interval History: Patient with no acute events overnight, Patient resting comfortably.  still awaiting placement     Review of Systems   Constitutional: Negative for activity change, chills, fatigue and fever.   HENT: Negative for drooling, hearing loss, trouble swallowing  and voice change.    Eyes: Negative for pain and visual disturbance.   Respiratory: Negative for cough, shortness of breath and wheezing.    Cardiovascular: Negative for chest pain and palpitations.   Gastrointestinal: Negative for abdominal pain, nausea and vomiting.   Genitourinary: Negative for difficulty urinating and flank pain.   Musculoskeletal: Positive for gait problem. Negative for arthralgias, back pain, myalgias and neck pain.   Skin: Negative for rash and wound.   Neurological: Negative for dizziness, weakness, numbness and headaches.   Psychiatric/Behavioral: Negative for agitation, confusion and hallucinations. The patient is not nervous/anxious.      Objective:     Vital Signs (Most Recent):  Temp: 98.6 °F (37 °C) (05/24/18 0733)  Pulse: 103 (05/24/18 0733)  Resp: 16 (05/24/18 0733)  BP: 120/76 (05/24/18 0733)  SpO2: 95 % (05/24/18 0733) Vital Signs (24h Range):  Temp:  [98.1 °F (36.7 °C)-99 °F (37.2 °C)] 98.6 °F (37 °C)  Pulse:  [] 103  Resp:  [16-24] 16  SpO2:  [92 %-98 %] 95 %  BP: (114-155)/(68-95) 120/76     Weight: 76.3 kg (168 lb 3.4 oz)  Body mass index is 27.15 kg/m².    Intake/Output Summary (Last 24 hours) at 05/24/18 0755  Last data filed at 05/24/18 0644   Gross per 24 hour   Intake              120 ml   Output              300 ml   Net             -180 ml      Physical Exam   Constitutional: He appears well-developed and well-nourished. No distress.   HENT:   Head: Normocephalic and atraumatic.   Right Ear: External ear normal.   Left Ear: External ear normal.   Nose: Nose normal.   Eyes: Right eye exhibits no discharge. Left eye exhibits no discharge. No scleral icterus.   Neck: Normal range of motion.   Cardiovascular: Normal rate, regular rhythm and intact distal pulses.    Pulmonary/Chest: Effort normal. No respiratory distress. He has no wheezes.   Diffuse rhonci   Abdominal: Soft. He exhibits no distension. There is no tenderness.   Musculoskeletal: Normal range of motion.  "He exhibits no edema or tenderness.   Neurological: He is alert. He exhibits normal muscle tone.   Skin: Skin is warm and dry. No rash noted.   Psychiatric: He has a normal mood and affect. His behavior is normal. His speech is not slurred. Cognition and memory are not impaired.   Vitals reviewed.      Significant Labs:   CBC:   No results for input(s): WBC, HGB, HCT, PLT in the last 48 hours.  CMP:     Recent Labs  Lab 05/23/18  0417      K 3.8      CO2 21*   GLU 89   BUN 18   CREATININE 0.9   CALCIUM 8.8   ANIONGAP 13   EGFRNONAA >60.0     All pertinent labs within the past 24 hours have been reviewed.    Significant Imaging: I have reviewed and interpreted all pertinent imaging results/findings within the past 24 hours.    Assessment/Plan:      * Aspiration pneumonia    - At OSH had significant leukocytosis with WBCs to 38K.  - CXR with bilateral multifocal infiltration.  - Suspect aspiration secondary to AMS.  -Completed course of Moxi. Last day was 5/20.         Gait disturbance    CTH 5/18 with Moderate diffuse dilation of the ventricular system, possibly normal pressure hydrocephalus.  Will need outpatient neurology f/u        Moderate malnutrition    - continue boost and full diet           Anemia    - Hgb stable; some concern for Hgb drop as outpatient. FOBT performed and positive; no gross evidence of bleed.          Fibrosis of lung    CT 5/7  "traction effects in the inferior aspect of the right middle lobe and inferior aspect of the basal segments of the right lower lobe suggesting fibrosis and consequent scarring"  Also  There are bilateral pleural calcifications peripherally and over the diaphragm consistent with asbestos related pleural disease.  Patient needs pulmonary f/u        Acute respiratory failure with hypoxia and hypercapnia    - Former tobacco abuse with history of COPD; concern for volume overload in ICU setting as well and underwent some diuresis. Respiratory status improved " now; on 05/10 did have significant respiratory demand prompting CTA; negative for thromboembolic process.  - Continuing albuterol-ipratropium 2.5-0.5mg inhaled PRN   - now on RA  -Last day of moxi on 5/20.           Alcoholism /alcohol abuse    - History of alcohol abuse; continuing folic acid, thiamine daily.          VTE Risk Mitigation         Ordered     enoxaparin injection 40 mg  Daily      05/06/18 1016     Place sequential compression device  Until discontinued      05/06/18 0113     IP VTE LOW RISK PATIENT  Once      05/06/18 0113              Eladia Barbour MD  Department of Hospital Medicine   [facility]

## 2018-05-24 NOTE — PROGRESS NOTES
"  [facility]  Adult Nutrition  Consult Note    SUMMARY     Recommendations    1. Continue current mechanical soft diet + Boost Plus ONS.   2. RD to monitor & follow-up.    Goals: Meet % EEN, EPN  Nutrition Goal Status: goal met  Communication of RD Recs: reviewed with RN    Reason for Assessment    Reason for Assessment: RD follow-up  Diagnosis:  ( Aspiration pneumonia )  Relevant Medical History: EtOH abuse, COPD    General Information Comments: ST recommends mechanical soft diet w/ nectar thick liquids. Per RN documentation, pt continues with fair appetite.  Nutrition Discharge Planning: Adequate PO intake    Nutrition/Diet History    Patient Reported Diet/Restrictions/Preferences: general  Food Preferences: none  Do you have any cultural, spiritual, Yazidi conflicts, given your current situation?: none stated  Food Allergies: NKFA  Factors Affecting Nutritional Intake: None identified at this time    Anthropometrics    Temp: 98.6 °F (37 °C)  Height Method: Estimated  Height: 5' 6" (167.6 cm)  Height (inches): 66 in  Weight Method: Bed Scale  Weight: 76.3 kg (168 lb 3.4 oz)  Weight (lb): 168.21 lb  Ideal Body Weight (IBW), Male: 142 lb  % Ideal Body Weight, Male (lb): 118.46 lb  BMI (Calculated): 27.2  BMI Grade: 25 - 29.9 - overweight  Weight Loss: unintentional  Usual Body Weight (UBW), k.1 kg  Weight Change Amount: 19 lb 9.9 oz  % Usual Body Weight: 89.73  % Weight Change From Usual Weight: -10.46 %    Lab/Procedures/Meds    Pertinent Labs Reviewed: reviewed  Pertinent Labs Comments: Stable  Pertinent Medications Reviewed: reviewed  Pertinent Medications Comments: -    Physical Findings/Assessment    Overall Physical Appearance: nourished  Oral/Mouth Cavity: WDL  Skin: pressure ulcer(s) (Left Buttocks)    Estimated/Assessed Needs    Weight Used For Calorie Calculations: 76.3 kg (168 lb 3.4 oz)     Energy Calorie Requirements (kcal): 1881 kcal/d  Energy Need Method: [place] (1.25 PAL)     Protein Requirements: 76-92 g/d (1-1.2 g/kg)  Weight Used For Protein Calculations: 76.3 kg (168 lb 3.4 oz)     Fluid Requirements (mL): 1 mL/kcal or per MD    Nutrition Prescription Ordered    Current Diet Order: Mechanical soft  Oral Nutrition Supplement: Boost Plus ONS    Nutrition Risk    Level of Risk/Frequency of Follow-up: moderate     Assessment and Plan    Moderate malnutrition     Malnutrition in the context of Acute Illness/Injury     Related to (etiology):  No nutrient intake for the last week     Signs and Symptoms (as evidenced by):  Energy Intake: 0 of estimated energy requirement for 1+ wk  Body Fat Depletion: mild depletion of orbital's, triceps and thoracic and lumbar region   Muscle Mass Depletion: mild depletion of temples, clavicle region, interosseous muscle and lower extremities   Weight Loss: 10.5% x 2 wks      Nutrition Diagnosis Status:  Continues, Improving      Monitor and Evaluation    Food and Nutrient Intake: energy intake, food and beverage intake  Food and Nutrient Adminstration: diet order  Physical Activity and Function: nutrition-related ADLs and IADLs  Anthropometric Measurements: weight, weight change  Biochemical Data, Medical Tests and Procedures: lipid profile, inflammatory profile, glucose/endocrine profile, gastrointestinal profile, electrolyte and renal panel  Nutrition-Focused Physical Findings: overall appearance     Nutrition Follow-Up    RD Follow-up?: Yes

## 2018-05-24 NOTE — PLAN OF CARE
Pt's referral packet was sent from [facility] to [facility] - pt is not currently accepted at [facility] but under review.

## 2018-05-25 LAB
ANION GAP SERPL CALC-SCNC: 10 MMOL/L
BUN SERPL-MCNC: 17 MG/DL
CALCIUM SERPL-MCNC: 9 MG/DL
CHLORIDE SERPL-SCNC: 103 MMOL/L
CO2 SERPL-SCNC: 25 MMOL/L
CREAT SERPL-MCNC: 0.8 MG/DL
EST. GFR  (AFRICAN AMERICAN): >60 ML/MIN/1.73 M^2
EST. GFR  (NON AFRICAN AMERICAN): >60 ML/MIN/1.73 M^2
GLUCOSE SERPL-MCNC: 91 MG/DL
MAGNESIUM SERPL-MCNC: 1.7 MG/DL
POTASSIUM SERPL-SCNC: 4 MMOL/L
SODIUM SERPL-SCNC: 138 MMOL/L

## 2018-05-25 PROCEDURE — 83735 ASSAY OF MAGNESIUM: CPT

## 2018-05-25 PROCEDURE — 97530 THERAPEUTIC ACTIVITIES: CPT

## 2018-05-25 PROCEDURE — 25000003 PHARM REV CODE 250: Performed by: INTERNAL MEDICINE

## 2018-05-25 PROCEDURE — 99900035 HC TECH TIME PER 15 MIN (STAT)

## 2018-05-25 PROCEDURE — 94761 N-INVAS EAR/PLS OXIMETRY MLT: CPT

## 2018-05-25 PROCEDURE — 25000242 PHARM REV CODE 250 ALT 637 W/ HCPCS: Performed by: INTERNAL MEDICINE

## 2018-05-25 PROCEDURE — 94664 DEMO&/EVAL PT USE INHALER: CPT

## 2018-05-25 PROCEDURE — 94668 MNPJ CHEST WALL SBSQ: CPT

## 2018-05-25 PROCEDURE — 27000646 HC AEROBIKA DEVICE

## 2018-05-25 PROCEDURE — 80048 BASIC METABOLIC PNL TOTAL CA: CPT

## 2018-05-25 PROCEDURE — 25000242 PHARM REV CODE 250 ALT 637 W/ HCPCS: Performed by: STUDENT IN AN ORGANIZED HEALTH CARE EDUCATION/TRAINING PROGRAM

## 2018-05-25 PROCEDURE — 63600175 PHARM REV CODE 636 W HCPCS: Performed by: STUDENT IN AN ORGANIZED HEALTH CARE EDUCATION/TRAINING PROGRAM

## 2018-05-25 PROCEDURE — 36415 COLL VENOUS BLD VENIPUNCTURE: CPT

## 2018-05-25 PROCEDURE — 99232 SBSQ HOSP IP/OBS MODERATE 35: CPT | Mod: ,,, | Performed by: HOSPITALIST

## 2018-05-25 PROCEDURE — 94640 AIRWAY INHALATION TREATMENT: CPT

## 2018-05-25 PROCEDURE — 20600001 HC STEP DOWN PRIVATE ROOM

## 2018-05-25 RX ORDER — IPRATROPIUM BROMIDE AND ALBUTEROL SULFATE 2.5; .5 MG/3ML; MG/3ML
3 SOLUTION RESPIRATORY (INHALATION)
Status: DISCONTINUED | OUTPATIENT
Start: 2018-05-25 | End: 2018-05-27

## 2018-05-25 RX ORDER — IPRATROPIUM BROMIDE AND ALBUTEROL SULFATE 2.5; .5 MG/3ML; MG/3ML
3 SOLUTION RESPIRATORY (INHALATION) EVERY 4 HOURS
Status: DISCONTINUED | OUTPATIENT
Start: 2018-05-25 | End: 2018-05-25

## 2018-05-25 RX ADMIN — IPRATROPIUM BROMIDE AND ALBUTEROL SULFATE 3 ML: .5; 3 SOLUTION RESPIRATORY (INHALATION) at 12:05

## 2018-05-25 RX ADMIN — IPRATROPIUM BROMIDE AND ALBUTEROL SULFATE 3 ML: .5; 3 SOLUTION RESPIRATORY (INHALATION) at 04:05

## 2018-05-25 RX ADMIN — FOLIC ACID 1 MG: 1 TABLET ORAL at 09:05

## 2018-05-25 RX ADMIN — THIAMINE HCL TAB 100 MG 100 MG: 100 TAB at 09:05

## 2018-05-25 RX ADMIN — ENOXAPARIN SODIUM 40 MG: 100 INJECTION SUBCUTANEOUS at 04:05

## 2018-05-25 RX ADMIN — IPRATROPIUM BROMIDE AND ALBUTEROL SULFATE 3 ML: .5; 3 SOLUTION RESPIRATORY (INHALATION) at 07:05

## 2018-05-25 NOTE — NURSING
[]Hover for attribution information  Pts LS are coarse and notified RT to come do breathing txs. Per MD new breathing txs q4hrs and acapella PRN, see orders for administration details.  VSS through out shift. Pt tolerating regular mechanical soft diet well with no trouble. Wound care provided d/t pt has some redness in groin and in between BLE's in groin area. Cleansed and barrier cream applied. Awaiting SNF placement. Bedside table and call light is within reach. WCTM.

## 2018-05-25 NOTE — PROGRESS NOTES
[facility] Hospital Medicine  Progress Note    Patient Name: Ryder Boo  MRN: [id number]  Patient Class: IP- Inpatient   Admission Date: 5/6/2018  Length of Stay: 19 days  Attending Physician: Shaylee Chao MD  Primary Care Provider: Swapnil Heart MD    [facility] Medicine Team: [facility] [place] HOSP MED 4 Eladia Barbour MD    Subjective:     Principal Problem:Aspiration pneumonia    HPI:  The patient is a 62 y/o M new to our system, with HTN, COPD and alcoholism, who is transfer from [facility] ICU to [facility] [place] on mechanical ventilation.     The patient lives with his brother across the street from his sister. He was found unresponsive in a chair with small amount of blood in his mouth by his nephew and taken to the hospital by EMS on 5/3. Patient drinks every day and has been having frequent falls recently. Per sister he has quit smoking 15 years ago.  In the ED at OSH he was found to be hypoxic and hypotensive, with undetectable EtOH levels, leukocytosis (24K) and hyponatremia (119) in the labs. he received narcan x1, IV fluids, started on metronidazole and ceftriaxone (also x1 of zosyn, azithromycin, vancomycin, ceftaroline) for aspiration pneumonia, and was put on 15L of Oxygen with non-rebreather, which improved his mental status and BP, however on 4/5 upon weaning off of oxygen he developed hypoxic hypercapnic respiratory failure and subsequent acidosis (pH 7.10), therefore he was intubated and transferred to the ICU. Patient arrived to [facility] [place] on dopamine gtt which per notes was started to maintain BP in the setting of hyponatremia.     Hospital Course:  5/6: upon arrival, patient was taken off of dopamine gtt and received 1.5L IV fluids to maintain MAPs. Continued on vanc/zosyn/azithromycin. Hyponatremia and ROHITH continued to improved with IVF. He was extubated in early afternoon and maintained normal O2 saturations on 3-4L oxygen on NC.   5/7: early morning he was noticed to be rattling while  breathing by the nurse. Upon examination he had increased work of breathing with worsening of bilateral rhonchi. Repeat CXR revealed interval worsening of the patchy infiltrations. Received x1 furosemide 60 mg IV and transitioned to high flow oxygen for possible development of ARDS, he is currently maintaining normal saturations, will keep on intubation watch.  5/8: several episodes of desaturation down to 70% overnight and early morning, that responded well to increasing oxygen to 100%, currently on 35L/50%, mildly drowsy due to precedex. Responded well to diuretics, net -2.8L. Will discontinue precedex and will have speech evaluate his swallowing.  5/9: Uneventful night off precedex. Remains confused, oriented to person and place. Oxygen requirements greatly improved 15L/50% with plan to wean to low-flow NC today. Diuresed -2.9L yesterday and discontinued lasix. Discontinued azithromycin and continued on zosyn. Failed speech eval by SLP and remains NPO  5/11-5/12 patient had NG tube placed, removed it and re-ordered on gentle tube feeds. On high o2 support at night as patient cannot have bipap due to NG.  05/14/2018 -Patient passed swallow eval to full liquids. He is now sating 100 % RA family updated awaiting rehab evaluation   05/17/2018 Patient was desating in the morning to 85-88 but he was alert oriented and no complaints gave him a breathing treatment   05/18/2018 awaiting placement  05/19/2018 CT, requested by rehab to r/o stroke, shows mild signs of possible NPH. Will order f/u o/p neuro. Patient reports x3 BMs, will see if continues. Will complete 1 more day of moxi.   05/20/2018 no acute events overnight patient on last day of moxi    Interval History: Patient with no acute events overnight, Patient resting comfortably. Has productive cough of yellow phlegm, some intermittent SOB.  CXR ordered, on schedule duonebs and acupella.  Review of Systems   Constitutional: Negative for activity change, chills,  fatigue and fever.   HENT: Negative for drooling, hearing loss, trouble swallowing and voice change.    Eyes: Negative for pain and visual disturbance.   Respiratory: Negative for cough, shortness of breath and wheezing.    Cardiovascular: Negative for chest pain and palpitations.   Gastrointestinal: Negative for abdominal pain, nausea and vomiting.   Genitourinary: Negative for difficulty urinating and flank pain.   Musculoskeletal: Positive for gait problem. Negative for arthralgias, back pain, myalgias and neck pain.   Skin: Negative for rash and wound.   Neurological: Negative for dizziness, weakness, numbness and headaches.   Psychiatric/Behavioral: Negative for agitation, confusion and hallucinations. The patient is not nervous/anxious.      Objective:     Vital Signs (Most Recent):  Temp: 98.5 °F (36.9 °C) (05/25/18 0723)  Pulse: 102 (05/25/18 0723)  Resp: 16 (05/25/18 0723)  BP: (!) 149/90 (05/25/18 0723)  SpO2: 96 % (05/25/18 0723) Vital Signs (24h Range):  Temp:  [98 °F (36.7 °C)-98.9 °F (37.2 °C)] 98.5 °F (36.9 °C)  Pulse:  [] 102  Resp:  [14-18] 16  SpO2:  [90 %-96 %] 96 %  BP: (112-149)/(72-98) 149/90     Weight: 76.3 kg (168 lb 3.4 oz)  Body mass index is 27.15 kg/m².    Intake/Output Summary (Last 24 hours) at 05/25/18 1032  Last data filed at 05/25/18 0700   Gross per 24 hour   Intake              480 ml   Output             2350 ml   Net            -1870 ml      Physical Exam   Constitutional: He appears well-developed and well-nourished. No distress.   HENT:   Head: Normocephalic and atraumatic.   Right Ear: External ear normal.   Left Ear: External ear normal.   Nose: Nose normal.   Eyes: Right eye exhibits no discharge. Left eye exhibits no discharge. No scleral icterus.   Neck: Normal range of motion.   Cardiovascular: Normal rate, regular rhythm and intact distal pulses.    Pulmonary/Chest: Effort normal. No respiratory distress. He has no wheezes.   Diffuse rhonci   Abdominal: Soft. He  "exhibits no distension. There is no tenderness.   Musculoskeletal: Normal range of motion. He exhibits no edema or tenderness.   Neurological: He is alert. He exhibits normal muscle tone.   Skin: Skin is warm and dry. No rash noted.   Psychiatric: He has a normal mood and affect. His behavior is normal. His speech is not slurred. Cognition and memory are not impaired.   Vitals reviewed.      Significant Labs:   CBC:   No results for input(s): WBC, HGB, HCT, PLT in the last 48 hours.  CMP:     Recent Labs  Lab 05/25/18  0422      K 4.0      CO2 25   GLU 91   BUN 17   CREATININE 0.8   CALCIUM 9.0   ANIONGAP 10   EGFRNONAA >60.0     All pertinent labs within the past 24 hours have been reviewed.    Significant Imaging: I have reviewed and interpreted all pertinent imaging results/findings within the past 24 hours.    Assessment/Plan:      * Aspiration pneumonia    - At OSH had significant leukocytosis with WBCs to 38K.  - CXR with bilateral multifocal infiltration.  - Suspect aspiration secondary to AMS.  -Completed course of Moxi. Last day was 5/20.         Gait disturbance    CTH 5/18 with Moderate diffuse dilation of the ventricular system, possibly normal pressure hydrocephalus.  Will need outpatient neurology f/u        Moderate malnutrition    - continue boost and full diet           Anemia    - Hgb stable; some concern for Hgb drop as outpatient. FOBT performed and positive; no gross evidence of bleed.          Fibrosis of lung    CT 5/7  "traction effects in the inferior aspect of the right middle lobe and inferior aspect of the basal segments of the right lower lobe suggesting fibrosis and consequent scarring"  Also  There are bilateral pleural calcifications peripherally and over the diaphragm consistent with asbestos related pleural disease.  Patient needs pulmonary f/u        Acute respiratory failure with hypoxia and hypercapnia    - Former tobacco abuse with history of COPD; concern for volume " overload in ICU setting as well and underwent some diuresis. Respiratory status improved now; on 05/10 did have significant respiratory demand prompting CTA; negative for thromboembolic process.  - Continuing albuterol-ipratropium 2.5-0.5mg inhaled PRN   - now on RA  -Last day of moxi on 5/20.           Alcoholism /alcohol abuse    - History of alcohol abuse; continuing folic acid, thiamine daily.          VTE Risk Mitigation         Ordered     enoxaparin injection 40 mg  Daily      05/06/18 1016     Place sequential compression device  Until discontinued      05/06/18 0113     IP VTE LOW RISK PATIENT  Once      05/06/18 0113              Eladia Barbour MD  Department of Hospital Medicine   [facility]

## 2018-05-25 NOTE — PLAN OF CARE
8:22 AM  SW f/u w/SNF's below:    [facility], [phone]  Spoke w/admin rep Shyanne and she reported that they do not accept Medicaid.  States she will update Pt's status as declined in [facility].    [facility], [phone].  Spoke w/rep Cami in admin.  She reported they do not have any male beds currently and they have been full for a couple of wks.    States the do not have any upcoming d/c's.    States she will speak w/staff person that updates RC to have Pt's status updated.    [facility], [phone].  Spoke w/ who reported admin rep in a meeting and provided reps name and #.  Left [facility], [phone], inquiring about the Pt's status.  Awaiting return phone call back.    [facility],  [phone].  SW spoke w/rep Charles, reported their Corporate rep was at their facility on yesterday and thought it would be best to send referral to their sister facility in [place] as it is closer to Pt's residence.  Report Varinder is the contact rep for [facility].    [facility], [phone].  Will be out to speak w/Pt today.  Per Charles at [facility] reps name is Varinder.    [facility], [phone].  [facility] reported they huddle at 9am and will have an answer for [facility] [place] in an hour, hour and a half or so.    [facility], [phone].  Shyanne in admin reported their beds are all filled so they sent referral over to [facility].  Declined Pt.    [facility], [phone].  Declined Pt.    [facility], [phone].  Declined Pt.    [facility], [phone].   Declined Pt.    [facility], [phone].  Declined Pt.     [facility], [phone]  Declined Pt.      Shasta Murray, MSW [facility]

## 2018-05-25 NOTE — PLAN OF CARE
Problem: Physical Therapy Goal  Goal: Physical Therapy Goal  Goals to be met by: 2018    Patient will increase functional independence with mobility by performin. Revised: Supine to sit with CGA  2. Revised: Sit to supine with CGA  3. Revised: Sit-stand transfer with CGA using LRAD  4.Revised: Bed-chair transfer with Min Assist using LRAD MET  5. Gait  x 20 feet with Moderate Assistance using LRAD or no AD - not met  6. Lower extremity exercise program x20 reps per handout, with supervision - met          Goals remain appropriate at time. Continue with PT POC as indicated.

## 2018-05-25 NOTE — PLAN OF CARE
CM placed a call to [facility] to f/u. They have the patients information and he is under review. They will come out to see the patient today. Given 's number for f/u when they arrive.

## 2018-05-25 NOTE — PLAN OF CARE
12:04 PM  SW spoke w/Willow, she reported she had all the documents she needs.    Reported she is now just waiting on autho from insurance company to accept Pt.      11:25 AM  Spoke w/admin rep Willow w/[facility], [phone], reported they will accept Pt if medical coverage approves.  Requested 142 and PASRR be faxed to her.        Shasta Murray, DENISE  [facility]

## 2018-05-25 NOTE — PLAN OF CARE
Problem: Physical Therapy Goal  Goal: Physical Therapy Goal  Goals to be met by: 2018    Patient will increase functional independence with mobility by performin. Revised: Supine to sit with CGA- Met 2018  2. Revised: Sit to supine with CGA  3. Revised: Sit-stand transfer with CGA using LRAD  4.Revised: Bed-chair transfer with Min Assist using LRAD MET  5. Gait  x 20 feet with Moderate Assistance using LRAD or no AD - not met  6. Lower extremity exercise program x20 reps per handout, with supervision - met          Goals remain appropriate at time. Continue with PT POC as indicated.

## 2018-05-25 NOTE — SUBJECTIVE & OBJECTIVE
Interval History: Patient with no acute events overnight, Patient resting comfortably.  still awaiting placement     Review of Systems   Constitutional: Negative for activity change, chills, fatigue and fever.   HENT: Negative for drooling, hearing loss, trouble swallowing and voice change.    Eyes: Negative for pain and visual disturbance.   Respiratory: Negative for cough, shortness of breath and wheezing.    Cardiovascular: Negative for chest pain and palpitations.   Gastrointestinal: Negative for abdominal pain, nausea and vomiting.   Genitourinary: Negative for difficulty urinating and flank pain.   Musculoskeletal: Positive for gait problem. Negative for arthralgias, back pain, myalgias and neck pain.   Skin: Negative for rash and wound.   Neurological: Negative for dizziness, weakness, numbness and headaches.   Psychiatric/Behavioral: Negative for agitation, confusion and hallucinations. The patient is not nervous/anxious.      Objective:     Vital Signs (Most Recent):  Temp: 98.5 °F (36.9 °C) (05/25/18 0723)  Pulse: 102 (05/25/18 0723)  Resp: 16 (05/25/18 0723)  BP: (!) 149/90 (05/25/18 0723)  SpO2: 96 % (05/25/18 0723) Vital Signs (24h Range):  Temp:  [98 °F (36.7 °C)-98.9 °F (37.2 °C)] 98.5 °F (36.9 °C)  Pulse:  [] 102  Resp:  [14-18] 16  SpO2:  [90 %-96 %] 96 %  BP: (112-149)/(72-98) 149/90     Weight: 76.3 kg (168 lb 3.4 oz)  Body mass index is 27.15 kg/m².    Intake/Output Summary (Last 24 hours) at 05/25/18 1032  Last data filed at 05/25/18 0700   Gross per 24 hour   Intake              480 ml   Output             2350 ml   Net            -1870 ml      Physical Exam   Constitutional: He appears well-developed and well-nourished. No distress.   HENT:   Head: Normocephalic and atraumatic.   Right Ear: External ear normal.   Left Ear: External ear normal.   Nose: Nose normal.   Eyes: Right eye exhibits no discharge. Left eye exhibits no discharge. No scleral icterus.   Neck: Normal range of motion.    Cardiovascular: Normal rate, regular rhythm and intact distal pulses.    Pulmonary/Chest: Effort normal. No respiratory distress. He has no wheezes.   Diffuse rhonci   Abdominal: Soft. He exhibits no distension. There is no tenderness.   Musculoskeletal: Normal range of motion. He exhibits no edema or tenderness.   Neurological: He is alert. He exhibits normal muscle tone.   Skin: Skin is warm and dry. No rash noted.   Psychiatric: He has a normal mood and affect. His behavior is normal. His speech is not slurred. Cognition and memory are not impaired.   Vitals reviewed.      Significant Labs:   CBC:   No results for input(s): WBC, HGB, HCT, PLT in the last 48 hours.  CMP:     Recent Labs  Lab 05/25/18  0422      K 4.0      CO2 25   GLU 91   BUN 17   CREATININE 0.8   CALCIUM 9.0   ANIONGAP 10   EGFRNONAA >60.0     All pertinent labs within the past 24 hours have been reviewed.    Significant Imaging: I have reviewed and interpreted all pertinent imaging results/findings within the past 24 hours.

## 2018-05-25 NOTE — PT/OT/SLP PROGRESS
Physical Therapy Treatment    Patient Name:  Ryder Boo   MRN:  [id number]    Recommendations:     Discharge Recommendations:  nursing facility, skilled   Discharge Equipment Recommendations: none   Barriers to discharge: Decreased caregiver support    Assessment:     Ryder Boo is a 63 y.o. male admitted with a medical diagnosis of Aspiration pneumonia.  He presents with the following impairments/functional limitations:  weakness, impaired endurance, impaired self care skills, impaired functional mobilty, gait instability, impaired balance, decreased lower extremity function, impaired cognition, decreased safety awareness, impaired cardiopulmonary response to activity. Pt treatment session limited 2/2 fatigue. Pt will continue to benefit from skilled PT services at this time. Continue with PT POC as indicated.     Rehab Prognosis:  Good; patient would benefit from acute skilled PT services to address these deficits and reach maximum level of function.      Recent Surgery: * No surgery found *      Plan:     During this hospitalization, patient to be seen 4 x/week to address the above listed problems via gait training, therapeutic activities, therapeutic exercises, neuromuscular re-education  · Plan of Care Expires:  06/06/18   Plan of Care Reviewed with: patient    Subjective     Communicated with nursing prior to session.  Patient found supine upon PT entry to room, agreeable to treatment.      Chief Complaint: fatigue  Patient comments/goals: none stated  Pain/Comfort:  · Pain Rating 1: 0/10  · Pain Rating Post-Intervention 1: 0/10    Patients cultural, spiritual, Taoist conflicts given the current situation: none stated    Objective:     Patient found with: bed alarm (lines intact)     General Precautions: Standard, aspiration, fall   Orthopedic Precautions:N/A   Braces: N/A     Functional Mobility:  · Bed Mobility:  Scooting: contact guard assistance  · Supine to Sit: contact guard assistance  · Sit to  Supine: contact guard assistance  · Transfers:  Sit to Stand:  moderate assistance with rolling walker      AM-PAC 6 CLICK MOBILITY  Turning over in bed (including adjusting bedclothes, sheets and blankets)?: 3  Sitting down on and standing up from a chair with arms (e.g., wheelchair, bedside commode, etc.): 3  Moving from lying on back to sitting on the side of the bed?: 3  Moving to and from a bed to a chair (including a wheelchair)?: 2  Need to walk in hospital room?: 2  Climbing 3-5 steps with a railing?: 1  Total Score: 14       Therapeutic Activities and Exercises:   -Pt performed seated B LE therex  x20 reps: AP, LAQ, and Hip Flexion   -Pt performed supine B LE therex x20 reps: Hip abd/add, QS, and GS    Patient left supine with all lines intact, call button in reach and bed alarm on..    GOALS:    Physical Therapy Goals        Problem: Physical Therapy Goal    Goal Priority Disciplines Outcome Goal Variances Interventions   Physical Therapy Goal     PT/OT, PT Ongoing (interventions implemented as appropriate)     Description:  Goals to be met by: 2018    Patient will increase functional independence with mobility by performin. Revised: Supine to sit with CGA  2. Revised: Sit to supine with CGA  3. Revised: Sit-stand transfer with CGA using LRAD  4.Revised: Bed-chair transfer with Min Assist using LRAD MET  5. Gait  x 20 feet with Moderate Assistance using LRAD or no AD - not met  6. Lower extremity exercise program x20 reps per handout, with supervision - met                           Time Tracking:     PT Received On: 18  PT Start Time: 1500     PT Stop Time: 1516  PT Total Time (min): 16 min     Billable Minutes: Therapeutic Activity 16    Treatment Type: Treatment  PT/PTA: PTA     PTA Visit Number: 1     Elysia Cabrales, PTA  2018

## 2018-05-26 LAB
BASOPHILS # BLD AUTO: 0.01 K/UL
BASOPHILS NFR BLD: 0.2 %
DIFFERENTIAL METHOD: ABNORMAL
EOSINOPHIL # BLD AUTO: 0.2 K/UL
EOSINOPHIL NFR BLD: 4 %
ERYTHROCYTE [DISTWIDTH] IN BLOOD BY AUTOMATED COUNT: 14.4 %
HCT VFR BLD AUTO: 28 %
HGB BLD-MCNC: 9.1 G/DL
IMM GRANULOCYTES # BLD AUTO: 0.02 K/UL
IMM GRANULOCYTES NFR BLD AUTO: 0.3 %
LYMPHOCYTES # BLD AUTO: 0.6 K/UL
LYMPHOCYTES NFR BLD: 9.9 %
MCH RBC QN AUTO: 30.1 PG
MCHC RBC AUTO-ENTMCNC: 32.5 G/DL
MCV RBC AUTO: 93 FL
MONOCYTES # BLD AUTO: 0.5 K/UL
MONOCYTES NFR BLD: 8.6 %
NEUTROPHILS # BLD AUTO: 4.6 K/UL
NEUTROPHILS NFR BLD: 77 %
NRBC BLD-RTO: 0 /100 WBC
PLATELET # BLD AUTO: 306 K/UL
PMV BLD AUTO: 10.5 FL
RBC # BLD AUTO: 3.02 M/UL
WBC # BLD AUTO: 5.93 K/UL

## 2018-05-26 PROCEDURE — 85025 COMPLETE CBC W/AUTO DIFF WBC: CPT

## 2018-05-26 PROCEDURE — 63600175 PHARM REV CODE 636 W HCPCS: Performed by: STUDENT IN AN ORGANIZED HEALTH CARE EDUCATION/TRAINING PROGRAM

## 2018-05-26 PROCEDURE — 94799 UNLISTED PULMONARY SVC/PX: CPT

## 2018-05-26 PROCEDURE — 25000242 PHARM REV CODE 250 ALT 637 W/ HCPCS: Performed by: STUDENT IN AN ORGANIZED HEALTH CARE EDUCATION/TRAINING PROGRAM

## 2018-05-26 PROCEDURE — 36415 COLL VENOUS BLD VENIPUNCTURE: CPT

## 2018-05-26 PROCEDURE — 99900035 HC TECH TIME PER 15 MIN (STAT)

## 2018-05-26 PROCEDURE — 99232 SBSQ HOSP IP/OBS MODERATE 35: CPT | Mod: ,,, | Performed by: HOSPITALIST

## 2018-05-26 PROCEDURE — 94640 AIRWAY INHALATION TREATMENT: CPT

## 2018-05-26 PROCEDURE — 94761 N-INVAS EAR/PLS OXIMETRY MLT: CPT

## 2018-05-26 PROCEDURE — 94664 DEMO&/EVAL PT USE INHALER: CPT

## 2018-05-26 PROCEDURE — 25000003 PHARM REV CODE 250: Performed by: INTERNAL MEDICINE

## 2018-05-26 PROCEDURE — 20600001 HC STEP DOWN PRIVATE ROOM

## 2018-05-26 RX ORDER — GUAIFENESIN 600 MG/1
600 TABLET, EXTENDED RELEASE ORAL 2 TIMES DAILY
Status: DISCONTINUED | OUTPATIENT
Start: 2018-05-26 | End: 2018-05-26

## 2018-05-26 RX ORDER — FLUTICASONE PROPIONATE 50 MCG
2 SPRAY, SUSPENSION (ML) NASAL DAILY
Status: DISCONTINUED | OUTPATIENT
Start: 2018-05-26 | End: 2018-06-01 | Stop reason: HOSPADM

## 2018-05-26 RX ADMIN — IPRATROPIUM BROMIDE AND ALBUTEROL SULFATE 3 ML: .5; 3 SOLUTION RESPIRATORY (INHALATION) at 08:05

## 2018-05-26 RX ADMIN — THIAMINE HCL TAB 100 MG 100 MG: 100 TAB at 10:05

## 2018-05-26 RX ADMIN — ENOXAPARIN SODIUM 40 MG: 100 INJECTION SUBCUTANEOUS at 05:05

## 2018-05-26 RX ADMIN — IPRATROPIUM BROMIDE AND ALBUTEROL SULFATE 3 ML: .5; 3 SOLUTION RESPIRATORY (INHALATION) at 01:05

## 2018-05-26 RX ADMIN — FLUTICASONE PROPIONATE 100 MCG: 50 SPRAY, METERED NASAL at 10:05

## 2018-05-26 RX ADMIN — IPRATROPIUM BROMIDE AND ALBUTEROL SULFATE 3 ML: .5; 3 SOLUTION RESPIRATORY (INHALATION) at 04:05

## 2018-05-26 RX ADMIN — FOLIC ACID 1 MG: 1 TABLET ORAL at 10:05

## 2018-05-26 NOTE — PLAN OF CARE
Problem: Patient Care Overview  Goal: Plan of Care Review  Dx: Pneumonia   Hx: HTN, COPD and alcoholism, who is a transfer from [facility] ICU to [facility] on mechanical ventilation after being found unresponsive at home    4/5: Transferred to [facility] on mechanical ventilation. Dopamine gtt off.   4/6: Pt extubated  5/7: Echo 50-55%         RN  MAP>65    *Pt likes [employer]IS channel 3        Patient VSS and afebrile. Patient is AAOx4. Patient free of falls and pressure injury. Patient's condom cath came off. Patient has used the urinal throughout the shift without issue. Gave patient a bath this morning and turned him over back and forth. Did not see a stage 3 pressure sore to patient's sacral area. Patient has no redness or breakdown around sacral or coccyx area. Patient has excoriation around his buttocks and perineal area. Applied barrier cream to the areas. Patient sats in the 90s on room air.

## 2018-05-26 NOTE — ASSESSMENT & PLAN NOTE
Stage 3 pressure injury with small amount of slough noted to patient's sacral spine. (0.4cm x 0.4cm x 0.2cm)   Wound Care consulted  Recommend applying triad barrier cream qshift and as needed.   Turn every 2hrs. Low airloss overlay ordered for patient. Nursing to continue care.

## 2018-05-26 NOTE — PROGRESS NOTES
[facility] Hospital Medicine  Progress Note    Patient Name: Ryder Boo  MRN: [id number]  Patient Class: IP- Inpatient   Admission Date: 5/6/2018  Length of Stay: 20 days  Attending Physician: Shaylee Chao MD  Primary Care Provider: Swapnil Heart MD    [facility] Medicine Team: [facility]P MED 4 Eladia Barbour MD    Subjective:     Principal Problem:Aspiration pneumonia    HPI:  The patient is a 62 y/o M new to our system, with HTN, COPD and alcoholism, who is transfer from [facility] [place].     The patient lives with his brother across the street from his sister. He was found unresponsive in a chair with small amount of blood in his mouth by his nephew and taken to the hospital by EMS on 5/3. Patient drinks every day and has been having frequent falls recently. Per sister he has quit smoking 15 years ago.  In the ED at OSH he was found to be hypoxic and hypotensive, with undetectable EtOH levels, leukocytosis (24K) and hyponatremia (119) in the labs. he received narcan x1, IV fluids, started on metronidazole and ceftriaxone (also x1 of zosyn, azithromycin, vancomycin, ceftaroline) for aspiration pneumonia, and was put on 15L of Oxygen with non-rebreather, which improved his mental status and BP, however on 4/5 upon weaning off of oxygen he developed hypoxic hypercapnic respiratory failure and subsequent acidosis (pH 7.10), therefore he was intubated and transferred to the ICU. Patient [place] [facility] [place] gtt which per notes was started to maintain BP in the setting of hyponatremia.     Hospital Course:  5/6: upon arrival, patient was taken off of dopamine gtt and received 1.5L IV fluids to maintain MAPs. Continued on vanc/zosyn/azithromycin. Hyponatremia and ROHITH continued to improved with IVF. He was extubated in early afternoon and maintained normal O2 saturations on 3-4L oxygen on NC.   5/7: early morning he was noticed to be rattling while  breathing by the nurse. Upon examination he had increased work of breathing with worsening of bilateral rhonchi. Repeat CXR revealed interval worsening of the patchy infiltrations. Received x1 furosemide 60 mg IV and transitioned to high flow oxygen for possible development of ARDS, he is currently maintaining normal saturations, will keep on intubation watch.  5/8: several episodes of desaturation down to 70% overnight and early morning, that responded well to increasing oxygen to 100%, currently on 35L/50%, mildly drowsy due to precedex. Responded well to diuretics, net -2.8L. Will discontinue precedex and will have speech evaluate his swallowing.  5/9: Uneventful night off precedex. Remains confused, oriented to person and place. Oxygen requirements greatly improved 15L/50% with plan to wean to low-flow NC today. Diuresed -2.9L yesterday and discontinued lasix. Discontinued azithromycin and continued on zosyn. Failed speech eval by SLP and remains NPO  5/11-5/12 patient had NG tube placed, removed it and re-ordered on gentle tube feeds. On high o2 support at night as patient cannot have bipap due to NG.  05/14/2018 -Patient passed swallow eval to full liquids. He is now sating 100 % RA family updated awaiting rehab evaluation   05/17/2018 Patient was desating in the morning to 85-88 but he was alert oriented and no complaints gave him a breathing treatment   05/18/2018 awaiting placement  05/19/2018 CT, requested by rehab to r/o stroke, shows mild signs of possible NPH. Will order f/u o/p neuro. Patient reports x3 BMs, will see if continues. Will complete 1 more day of moxi.   05/20/2018 no acute events overnight patient on last day of moxi  Subsequently  Patient has been awaiting placement, added chest physiotherapy, deep suction, and acupella for chest congestion; also for upper airway congestion, added flonase and mucinex     Interval History: Patient complains of congestion and cough productive of some mucous  (brown); chest physiotherapy and deep suction in addition to acupella ordered. Upper airway congestion addressed with mucinex and flonase.    Review of Systems   Constitutional: Negative for activity change, chills, fatigue and fever.   HENT: Positive for congestion. Negative for drooling, hearing loss, trouble swallowing and voice change.    Eyes: Negative for pain and visual disturbance.   Respiratory: Positive for cough. Negative for shortness of breath and wheezing.    Cardiovascular: Negative for chest pain and palpitations.   Gastrointestinal: Negative for abdominal pain, nausea and vomiting.   Genitourinary: Negative for difficulty urinating and flank pain.   Musculoskeletal: Positive for gait problem. Negative for arthralgias, back pain, myalgias and neck pain.   Skin: Negative for rash and wound.   Neurological: Negative for dizziness, weakness, numbness and headaches.   Psychiatric/Behavioral: Negative for agitation, confusion and hallucinations. The patient is not nervous/anxious.      Objective:     Vital Signs (Most Recent):  Temp: 98.1 °F (36.7 °C) (05/26/18 1322)  Pulse: 107 (05/26/18 1322)  Resp: 18 (05/26/18 1322)  BP: (!) 141/89 (05/26/18 1322)  SpO2: (!) 94 % (05/26/18 1322) Vital Signs (24h Range):  Temp:  [98.1 °F (36.7 °C)-98.8 °F (37.1 °C)] 98.1 °F (36.7 °C)  Pulse:  [100-107] 107  Resp:  [14-20] 18  SpO2:  [92 %-100 %] 94 %  BP: (122-141)/(77-89) 141/89     Weight: 76.3 kg (168 lb 3.4 oz)  Body mass index is 27.15 kg/m².    Intake/Output Summary (Last 24 hours) at 05/26/18 1403  Last data filed at 05/26/18 1327   Gross per 24 hour   Intake              240 ml   Output              150 ml   Net               90 ml      Physical Exam   Constitutional: He appears well-developed and well-nourished. No distress.   HENT:   Head: Normocephalic and atraumatic.   Right Ear: External ear normal.   Left Ear: External ear normal.   Nose: Nose normal.   Eyes: Right eye exhibits no discharge. Left eye  "exhibits no discharge. No scleral icterus.   Neck: Normal range of motion.   Cardiovascular: Normal rate, regular rhythm and intact distal pulses.    Pulmonary/Chest: Effort normal. No respiratory distress. He has no wheezes.   Diffuse rhonci   Abdominal: Soft. He exhibits no distension. There is no tenderness.   Musculoskeletal: Normal range of motion. He exhibits no edema or tenderness.   Neurological: He is alert. He exhibits normal muscle tone.   Skin: Skin is warm and dry. No rash noted.   Psychiatric: He has a normal mood and affect. His behavior is normal. His speech is not slurred. Cognition and memory are not impaired.   Vitals reviewed.      Significant Labs:   CBC:     Recent Labs  Lab 05/26/18  0430   WBC 5.93   HGB 9.1*   HCT 28.0*        CMP:     Recent Labs  Lab 05/25/18  0422      K 4.0      CO2 25   GLU 91   BUN 17   CREATININE 0.8   CALCIUM 9.0   ANIONGAP 10   EGFRNONAA >60.0     All pertinent labs within the past 24 hours have been reviewed.    Significant Imaging: I have reviewed and interpreted all pertinent imaging results/findings within the past 24 hours.    Assessment/Plan:      * Aspiration pneumonia    - At OSH had significant leukocytosis with WBCs to 38K.  - CXR with bilateral multifocal infiltration.  - Suspect aspiration secondary to AMS.  -Completed course of Moxi. Last day was 5/20.         Fibrosis of lung    CT 5/7  "traction effects in the inferior aspect of the right middle lobe and inferior aspect of the basal segments of the right lower lobe suggesting fibrosis and consequent scarring"  Also  There are bilateral pleural calcifications peripherally and over the diaphragm consistent with asbestos related pleural disease.  Patient needs pulmonary f/u  Patient has chest congestion and cough currently, chest physiotherapy, acupella, and mucinex ordered        Gait disturbance    CTH 5/18 with Moderate diffuse dilation of the ventricular system, possibly normal " pressure hydrocephalus.  Will need outpatient neurology f/u        Pressure injury of skin and subcutaneous tissue, stage 3    Stage 3 pressure injury with small amount of slough noted to patient's sacral spine. (0.4cm x 0.4cm x 0.2cm)   Wound Care consulted  Recommend applying triad barrier cream qshift and as needed.   Turn every 2hrs. Low airloss overlay ordered for patient. Nursing to continue care.          Moderate malnutrition    - continue boost and full diet           Anemia    - Hgb stable; some concern for Hgb drop as outpatient. FOBT performed and positive; no gross evidence of bleed.          Acute respiratory failure with hypoxia and hypercapnia    - Former tobacco abuse with history of COPD; concern for volume overload in ICU setting as well and underwent some diuresis. Respiratory status improved now; on 05/10 did have significant respiratory demand prompting CTA; negative for thromboembolic process.  - Continuing albuterol-ipratropium 2.5-0.5mg inhaled PRN   - now on RA  -Last day of moxi on 5/20.           Alcoholism /alcohol abuse    - History of alcohol abuse; continuing folic acid, thiamine daily.          VTE Risk Mitigation         Ordered     enoxaparin injection 40 mg  Daily      05/06/18 1016     Place sequential compression device  Until discontinued      05/06/18 0113     IP VTE LOW RISK PATIENT  Once      05/06/18 0113              Eladia Barbour MD  Department of Hospital Medicine   [facility]

## 2018-05-26 NOTE — SUBJECTIVE & OBJECTIVE
Interval History: Patient complains of congestion and cough productive of some mucous (brown); chest physiotherapy in addition to acupella ordered.    Review of Systems   Constitutional: Negative for activity change, chills, fatigue and fever.   HENT: Positive for congestion. Negative for drooling, hearing loss, trouble swallowing and voice change.    Eyes: Negative for pain and visual disturbance.   Respiratory: Positive for cough. Negative for shortness of breath and wheezing.    Cardiovascular: Negative for chest pain and palpitations.   Gastrointestinal: Negative for abdominal pain, nausea and vomiting.   Genitourinary: Negative for difficulty urinating and flank pain.   Musculoskeletal: Positive for gait problem. Negative for arthralgias, back pain, myalgias and neck pain.   Skin: Negative for rash and wound.   Neurological: Negative for dizziness, weakness, numbness and headaches.   Psychiatric/Behavioral: Negative for agitation, confusion and hallucinations. The patient is not nervous/anxious.      Objective:     Vital Signs (Most Recent):  Temp: 98.1 °F (36.7 °C) (05/26/18 1322)  Pulse: 107 (05/26/18 1322)  Resp: 18 (05/26/18 1322)  BP: (!) 141/89 (05/26/18 1322)  SpO2: (!) 94 % (05/26/18 1322) Vital Signs (24h Range):  Temp:  [98.1 °F (36.7 °C)-98.8 °F (37.1 °C)] 98.1 °F (36.7 °C)  Pulse:  [100-107] 107  Resp:  [14-20] 18  SpO2:  [92 %-100 %] 94 %  BP: (122-141)/(77-89) 141/89     Weight: 76.3 kg (168 lb 3.4 oz)  Body mass index is 27.15 kg/m².    Intake/Output Summary (Last 24 hours) at 05/26/18 1403  Last data filed at 05/26/18 1327   Gross per 24 hour   Intake              240 ml   Output              150 ml   Net               90 ml      Physical Exam   Constitutional: He appears well-developed and well-nourished. No distress.   HENT:   Head: Normocephalic and atraumatic.   Right Ear: External ear normal.   Left Ear: External ear normal.   Nose: Nose normal.   Eyes: Right eye exhibits no discharge. Left  eye exhibits no discharge. No scleral icterus.   Neck: Normal range of motion.   Cardiovascular: Normal rate, regular rhythm and intact distal pulses.    Pulmonary/Chest: Effort normal. No respiratory distress. He has no wheezes.   Diffuse rhonci   Abdominal: Soft. He exhibits no distension. There is no tenderness.   Musculoskeletal: Normal range of motion. He exhibits no edema or tenderness.   Neurological: He is alert. He exhibits normal muscle tone.   Skin: Skin is warm and dry. No rash noted.   Psychiatric: He has a normal mood and affect. His behavior is normal. His speech is not slurred. Cognition and memory are not impaired.   Vitals reviewed.      Significant Labs:   CBC:     Recent Labs  Lab 05/26/18  0430   WBC 5.93   HGB 9.1*   HCT 28.0*        CMP:     Recent Labs  Lab 05/25/18  0422      K 4.0      CO2 25   GLU 91   BUN 17   CREATININE 0.8   CALCIUM 9.0   ANIONGAP 10   EGFRNONAA >60.0     All pertinent labs within the past 24 hours have been reviewed.    Significant Imaging: I have reviewed and interpreted all pertinent imaging results/findings within the past 24 hours.

## 2018-05-26 NOTE — ASSESSMENT & PLAN NOTE
"CT 5/7  "traction effects in the inferior aspect of the right middle lobe and inferior aspect of the basal segments of the right lower lobe suggesting fibrosis and consequent scarring"  Also  There are bilateral pleural calcifications peripherally and over the diaphragm consistent with asbestos related pleural disease.  Patient needs pulmonary f/u  Patient has chest congestion and cough currently, chest physiotherapy, acupella, and mucinex ordered  "

## 2018-05-26 NOTE — PLAN OF CARE
Problem: Patient Care Overview  Goal: Plan of Care Review  Dx: Pneumonia   Hx: HTN, COPD and alcoholism, who is a transfer from [facility] ICU to [facility] on mechanical ventilation after being found unresponsive at home    4/5: Transferred to [facility] on mechanical ventilation. Dopamine gtt off.   4/6: Pt extubated  5/7: Echo 50-55%         RN  MAP>65    *Pt likes [employer] channel 3        No acute changes this shift. Pt has had no complaints whatsoever. Vitals have been stable through this shift. Slept well. WCTM.

## 2018-05-27 LAB
ANION GAP SERPL CALC-SCNC: 8 MMOL/L
BUN SERPL-MCNC: 14 MG/DL
CALCIUM SERPL-MCNC: 9.4 MG/DL
CHLORIDE SERPL-SCNC: 101 MMOL/L
CO2 SERPL-SCNC: 28 MMOL/L
CREAT SERPL-MCNC: 0.8 MG/DL
EST. GFR  (AFRICAN AMERICAN): >60 ML/MIN/1.73 M^2
EST. GFR  (NON AFRICAN AMERICAN): >60 ML/MIN/1.73 M^2
GLUCOSE SERPL-MCNC: 92 MG/DL
MAGNESIUM SERPL-MCNC: 1.6 MG/DL
POTASSIUM SERPL-SCNC: 4.2 MMOL/L
PROCALCITONIN SERPL IA-MCNC: 0.18 NG/ML
SODIUM SERPL-SCNC: 137 MMOL/L

## 2018-05-27 PROCEDURE — 94668 MNPJ CHEST WALL SBSQ: CPT

## 2018-05-27 PROCEDURE — 63600175 PHARM REV CODE 636 W HCPCS: Performed by: STUDENT IN AN ORGANIZED HEALTH CARE EDUCATION/TRAINING PROGRAM

## 2018-05-27 PROCEDURE — 84145 PROCALCITONIN (PCT): CPT

## 2018-05-27 PROCEDURE — 25000242 PHARM REV CODE 250 ALT 637 W/ HCPCS: Performed by: STUDENT IN AN ORGANIZED HEALTH CARE EDUCATION/TRAINING PROGRAM

## 2018-05-27 PROCEDURE — 94640 AIRWAY INHALATION TREATMENT: CPT

## 2018-05-27 PROCEDURE — 20600001 HC STEP DOWN PRIVATE ROOM

## 2018-05-27 PROCEDURE — 99232 SBSQ HOSP IP/OBS MODERATE 35: CPT | Mod: ,,, | Performed by: HOSPITALIST

## 2018-05-27 PROCEDURE — 31720 CLEARANCE OF AIRWAYS: CPT

## 2018-05-27 PROCEDURE — 25000003 PHARM REV CODE 250: Performed by: INTERNAL MEDICINE

## 2018-05-27 PROCEDURE — 80048 BASIC METABOLIC PNL TOTAL CA: CPT

## 2018-05-27 PROCEDURE — 94761 N-INVAS EAR/PLS OXIMETRY MLT: CPT

## 2018-05-27 PROCEDURE — 94664 DEMO&/EVAL PT USE INHALER: CPT

## 2018-05-27 PROCEDURE — 36415 COLL VENOUS BLD VENIPUNCTURE: CPT

## 2018-05-27 PROCEDURE — 83735 ASSAY OF MAGNESIUM: CPT

## 2018-05-27 PROCEDURE — 94799 UNLISTED PULMONARY SVC/PX: CPT

## 2018-05-27 PROCEDURE — 25000242 PHARM REV CODE 250 ALT 637 W/ HCPCS: Performed by: INTERNAL MEDICINE

## 2018-05-27 PROCEDURE — 99900035 HC TECH TIME PER 15 MIN (STAT)

## 2018-05-27 RX ORDER — GUAIFENESIN 600 MG/1
600 TABLET, EXTENDED RELEASE ORAL 2 TIMES DAILY
Status: DISCONTINUED | OUTPATIENT
Start: 2018-05-27 | End: 2018-06-01 | Stop reason: HOSPADM

## 2018-05-27 RX ORDER — IPRATROPIUM BROMIDE AND ALBUTEROL SULFATE 2.5; .5 MG/3ML; MG/3ML
3 SOLUTION RESPIRATORY (INHALATION) EVERY 6 HOURS
Status: DISCONTINUED | OUTPATIENT
Start: 2018-05-27 | End: 2018-06-01 | Stop reason: HOSPADM

## 2018-05-27 RX ORDER — IPRATROPIUM BROMIDE AND ALBUTEROL SULFATE 2.5; .5 MG/3ML; MG/3ML
3 SOLUTION RESPIRATORY (INHALATION) EVERY 4 HOURS PRN
Status: DISCONTINUED | OUTPATIENT
Start: 2018-05-27 | End: 2018-05-27

## 2018-05-27 RX ORDER — CETIRIZINE HYDROCHLORIDE 5 MG/1
5 TABLET ORAL DAILY
Status: DISCONTINUED | OUTPATIENT
Start: 2018-05-27 | End: 2018-06-01 | Stop reason: HOSPADM

## 2018-05-27 RX ADMIN — IPRATROPIUM BROMIDE AND ALBUTEROL SULFATE 3 ML: .5; 3 SOLUTION RESPIRATORY (INHALATION) at 08:05

## 2018-05-27 RX ADMIN — GUAIFENESIN 600 MG: 600 TABLET, EXTENDED RELEASE ORAL at 12:05

## 2018-05-27 RX ADMIN — ENOXAPARIN SODIUM 40 MG: 100 INJECTION SUBCUTANEOUS at 07:05

## 2018-05-27 RX ADMIN — FOLIC ACID 1 MG: 1 TABLET ORAL at 09:05

## 2018-05-27 RX ADMIN — IPRATROPIUM BROMIDE AND ALBUTEROL SULFATE 3 ML: .5; 3 SOLUTION RESPIRATORY (INHALATION) at 07:05

## 2018-05-27 RX ADMIN — FLUTICASONE PROPIONATE 100 MCG: 50 SPRAY, METERED NASAL at 09:05

## 2018-05-27 RX ADMIN — CETIRIZINE HYDROCHLORIDE 5 MG: 5 TABLET, FILM COATED ORAL at 09:05

## 2018-05-27 RX ADMIN — IPRATROPIUM BROMIDE AND ALBUTEROL SULFATE 3 ML: .5; 3 SOLUTION RESPIRATORY (INHALATION) at 10:05

## 2018-05-27 RX ADMIN — THIAMINE HCL TAB 100 MG 100 MG: 100 TAB at 09:05

## 2018-05-27 NOTE — PROGRESS NOTES
[facility] Hospital Medicine  Progress Note    Patient Name: Ryder Boo  MRN: [id number]  Patient Class: IP- Inpatient   Admission Date: 5/6/2018  Length of Stay: 21 days  Attending Physician: Shaylee Chao MD  Primary Care Provider: Swapnil Heart MD    [facility] Medicine Team: [facility] HOSP MED 4 SILVIA Bañuelos    Subjective:     Principal Problem:Aspiration pneumonia    HPI:  The patient is a 62 y/o M new to our system, with HTN, COPD and alcoholism, who is transfer from [facility] ICU to [facility] on mechanical ventilation.     The patient lives with his brother across the street from his sister. He was found unresponsive in a chair with small amount of blood in his mouth by his nephew and taken to the hospital by EMS on 5/3. Patient drinks every day and has been having frequent falls recently. Per sister he has quit smoking 15 years ago.  In the ED at OSH he was found to be hypoxic and hypotensive, with undetectable EtOH levels, leukocytosis (24K) and hyponatremia (119) in the labs. he received narcan x1, IV fluids, started on metronidazole and ceftriaxone (also x1 of zosyn, azithromycin, vancomycin, ceftaroline) for aspiration pneumonia, and was put on 15L of Oxygen with non-rebreather, which improved his mental status and BP, however on 4/5 upon weaning off of oxygen he developed hypoxic hypercapnic respiratory failure and subsequent acidosis (pH 7.10), therefore he was intubated and transferred to the ICU. Patient arrived to [facility] on dopamine gtt which per notes was started to maintain BP in the setting of hyponatremia.     Hospital Course:  5/6: upon arrival, patient was taken off of dopamine gtt and received 1.5L IV fluids to maintain MAPs. Continued on vanc/zosyn/azithromycin. Hyponatremia and ROHITH continued to improved with IVF. He was extubated in early afternoon and maintained normal O2 saturations on 3-4L oxygen on NC.   5/7: early morning he was noticed to be rattling  while breathing by the nurse. Upon examination he had increased work of breathing with worsening of bilateral rhonchi. Repeat CXR revealed interval worsening of the patchy infiltrations. Received x1 furosemide 60 mg IV and transitioned to high flow oxygen for possible development of ARDS, he is currently maintaining normal saturations, will keep on intubation watch.  5/8: several episodes of desaturation down to 70% overnight and early morning, that responded well to increasing oxygen to 100%, currently on 35L/50%, mildly drowsy due to precedex. Responded well to diuretics, net -2.8L. Will discontinue precedex and will have speech evaluate his swallowing.  5/9: Uneventful night off precedex. Remains confused, oriented to person and place. Oxygen requirements greatly improved 15L/50% with plan to wean to low-flow NC today. Diuresed -2.9L yesterday and discontinued lasix. Discontinued azithromycin and continued on zosyn. Failed speech eval by SLP and remains NPO  5/11-5/12 patient had NG tube placed, removed it and re-ordered on gentle tube feeds. On high o2 support at night as patient cannot have bipap due to NG.  05/14/2018 -Patient passed swallow eval to full liquids. He is now sating 100 % RA family updated awaiting rehab evaluation   05/17/2018 Patient was desating in the morning to 85-88 but he was alert oriented and no complaints gave him a breathing treatment   05/18/2018 awaiting placement  05/19/2018 CT, requested by rehab to r/o stroke, shows mild signs of possible NPH. Will order f/u o/p neuro. Patient reports x3 BMs, will see if continues. Will complete 1 more day of moxi.   05/20/2018 no acute events overnight patient on last day of moxi  Subsequently  Patient has been awaiting placement, added chest physiotherapy, deep suction, and acupella for chest congestion; also for upper airway congestion, added flonase and mucinex     Interval History: Patient complains of congestion and cough productive of some  mucous (brown); chest physiotherapy in addition to acupella ordered.    Review of Systems   Constitutional: Negative for activity change, chills, fatigue and fever.   HENT: Positive for congestion. Negative for drooling, hearing loss, trouble swallowing and voice change.    Eyes: Negative for pain and visual disturbance.   Respiratory: Positive for cough. Negative for shortness of breath and wheezing.    Cardiovascular: Negative for chest pain and palpitations.   Gastrointestinal: Negative for abdominal pain, nausea and vomiting.   Genitourinary: Negative for difficulty urinating and flank pain.   Musculoskeletal: Positive for gait problem. Negative for arthralgias, back pain, myalgias and neck pain.   Skin: Negative for rash and wound.   Neurological: Negative for dizziness, weakness, numbness and headaches.   Psychiatric/Behavioral: Negative for agitation, confusion and hallucinations. The patient is not nervous/anxious.      Objective:     Vital Signs (Most Recent):  Temp: 99.1 °F (37.3 °C) (05/27/18 0447)  Pulse: 79 (05/27/18 0748)  Resp: 16 (05/27/18 0748)  BP: (!) 144/74 (05/27/18 0447)  SpO2: 95 % (05/27/18 0748) Vital Signs (24h Range):  Temp:  [98.1 °F (36.7 °C)-99.6 °F (37.6 °C)] 99.1 °F (37.3 °C)  Pulse:  [] 79  Resp:  [14-20] 16  SpO2:  [90 %-97 %] 95 %  BP: (124-150)/(74-89) 144/74     Weight: 76.3 kg (168 lb 3.4 oz)  Body mass index is 27.15 kg/m².    Intake/Output Summary (Last 24 hours) at 05/27/18 09  Last data filed at 05/26/18 8634   Gross per 24 hour   Intake                0 ml   Output              600 ml   Net             -600 ml      Physical Exam   Constitutional: He appears well-developed and well-nourished. No distress.   HENT:   Head: Normocephalic and atraumatic.   Right Ear: External ear normal.   Left Ear: External ear normal.   Nose: Nose normal.   Eyes: Right eye exhibits no discharge. Left eye exhibits no discharge. No scleral icterus.   Neck: Normal range of motion.    Cardiovascular: Normal rate, regular rhythm and intact distal pulses.    Pulmonary/Chest: Effort normal. No respiratory distress. He has no wheezes.   Diffuse rhonci   Abdominal: Soft. He exhibits no distension. There is no tenderness.   Musculoskeletal: Normal range of motion. He exhibits no edema or tenderness.   Neurological: He is alert. He exhibits normal muscle tone.   Skin: Skin is warm and dry. No rash noted.   Psychiatric: He has a normal mood and affect. His behavior is normal. His speech is not slurred. Cognition and memory are not impaired.   Vitals reviewed.      Significant Labs:   CBC:     Recent Labs  Lab 05/26/18  0430   WBC 5.93   HGB 9.1*   HCT 28.0*        CMP:     Recent Labs  Lab 05/27/18  0633      K 4.2      CO2 28   GLU 92   BUN 14   CREATININE 0.8   CALCIUM 9.4   ANIONGAP 8   EGFRNONAA >60.0     All pertinent labs within the past 24 hours have been reviewed.    Significant Imaging: I have reviewed and interpreted all pertinent imaging results/findings within the past 24 hours.    Assessment/Plan:      * Aspiration pneumonia    - At OSH had significant leukocytosis with WBCs to 38K.  - CXR with bilateral multifocal infiltration.  - Suspect aspiration secondary to AMS.  -Completed course of Moxi. Last day was 5/20.         Gait disturbance    CTH 5/18 with Moderate diffuse dilation of the ventricular system, possibly normal pressure hydrocephalus.  Will need outpatient neurology f/u        Pressure injury of skin and subcutaneous tissue, stage 3    Stage 3 pressure injury with small amount of slough noted to patient's sacral spine. (0.4cm x 0.4cm x 0.2cm)   Wound Care consulted  Recommend applying triad barrier cream qshift and as needed.   Turn every 2hrs. Low airloss overlay ordered for patient. Nursing to continue care.          Moderate malnutrition    - continue boost and full diet           Anemia    - Hgb stable; some concern for Hgb drop as outpatient. FOBT  "performed and positive; no gross evidence of bleed.          Fibrosis of lung    CT 5/7  "traction effects in the inferior aspect of the right middle lobe and inferior aspect of the basal segments of the right lower lobe suggesting fibrosis and consequent scarring"  Also  There are bilateral pleural calcifications peripherally and over the diaphragm consistent with asbestos related pleural disease.  Patient needs pulmonary f/u  Patient has chest congestion and cough currently, chest physiotherapy, acupella, and mucinex ordered        Acute respiratory failure with hypoxia and hypercapnia    - Former tobacco abuse with history of COPD; concern for volume overload in ICU setting as well and underwent some diuresis. Respiratory status improved now; on 05/10 did have significant respiratory demand prompting CTA; negative for thromboembolic process.  - Continuing albuterol-ipratropium 2.5-0.5mg inhaled PRN   - now on RA  -Last day of moxi on 5/20.           Alcoholism /alcohol abuse    - History of alcohol abuse; continuing folic acid, thiamine daily.          VTE Risk Mitigation         Ordered     enoxaparin injection 40 mg  Daily      05/06/18 1016     Place sequential compression device  Until discontinued      05/06/18 0113     IP VTE LOW RISK PATIENT  Once      05/06/18 0113              SILVIA Bañuelos  Department of Hospital Medicine   [facility]  "

## 2018-05-27 NOTE — SUBJECTIVE & OBJECTIVE
Interval History: Patient complains of congestion and cough productive of some mucous (brown); chest physiotherapy in addition to acupella ordered.    Review of Systems   Constitutional: Negative for activity change, chills, fatigue and fever.   HENT: Positive for congestion. Negative for drooling, hearing loss, trouble swallowing and voice change.    Eyes: Negative for pain and visual disturbance.   Respiratory: Positive for cough. Negative for shortness of breath and wheezing.    Cardiovascular: Negative for chest pain and palpitations.   Gastrointestinal: Negative for abdominal pain, nausea and vomiting.   Genitourinary: Negative for difficulty urinating and flank pain.   Musculoskeletal: Positive for gait problem. Negative for arthralgias, back pain, myalgias and neck pain.   Skin: Negative for rash and wound.   Neurological: Negative for dizziness, weakness, numbness and headaches.   Psychiatric/Behavioral: Negative for agitation, confusion and hallucinations. The patient is not nervous/anxious.      Objective:     Vital Signs (Most Recent):  Temp: 99.1 °F (37.3 °C) (05/27/18 0447)  Pulse: 79 (05/27/18 0748)  Resp: 16 (05/27/18 0748)  BP: (!) 144/74 (05/27/18 0447)  SpO2: 95 % (05/27/18 0748) Vital Signs (24h Range):  Temp:  [98.1 °F (36.7 °C)-99.6 °F (37.6 °C)] 99.1 °F (37.3 °C)  Pulse:  [] 79  Resp:  [14-20] 16  SpO2:  [90 %-97 %] 95 %  BP: (124-150)/(74-89) 144/74     Weight: 76.3 kg (168 lb 3.4 oz)  Body mass index is 27.15 kg/m².    Intake/Output Summary (Last 24 hours) at 05/27/18 0940  Last data filed at 05/26/18 5726   Gross per 24 hour   Intake                0 ml   Output              600 ml   Net             -600 ml      Physical Exam   Constitutional: He appears well-developed and well-nourished. No distress.   HENT:   Head: Normocephalic and atraumatic.   Right Ear: External ear normal.   Left Ear: External ear normal.   Nose: Nose normal.   Eyes: Right eye exhibits no discharge. Left eye  exhibits no discharge. No scleral icterus.   Neck: Normal range of motion.   Cardiovascular: Normal rate, regular rhythm and intact distal pulses.    Pulmonary/Chest: Effort normal. No respiratory distress. He has no wheezes.   Diffuse rhonci   Abdominal: Soft. He exhibits no distension. There is no tenderness.   Musculoskeletal: Normal range of motion. He exhibits no edema or tenderness.   Neurological: He is alert. He exhibits normal muscle tone.   Skin: Skin is warm and dry. No rash noted.   Psychiatric: He has a normal mood and affect. His behavior is normal. His speech is not slurred. Cognition and memory are not impaired.   Vitals reviewed.      Significant Labs:   CBC:     Recent Labs  Lab 05/26/18  0430   WBC 5.93   HGB 9.1*   HCT 28.0*        CMP:     Recent Labs  Lab 05/27/18  0633      K 4.2      CO2 28   GLU 92   BUN 14   CREATININE 0.8   CALCIUM 9.4   ANIONGAP 8   EGFRNONAA >60.0     All pertinent labs within the past 24 hours have been reviewed.    Significant Imaging: I have reviewed and interpreted all pertinent imaging results/findings within the past 24 hours.

## 2018-05-27 NOTE — NURSING
Patient called out to nurses station reporting he couldn't breath. Upon entering room patient with coarse/wet cough, suction in hand but not actively trying to suction. Encouraged suction- thin mucus (possibly water) suctioned out. Paged and spoke with IM[facility] resident. While on the phone patient relayed to me that every time he drinks water he coughs. MD to place NPO order with SLP yoli in AM, patient updated. Bedside RN updated.

## 2018-05-27 NOTE — PLAN OF CARE
Problem: Patient Care Overview  Goal: Plan of Care Review  Dx: Pneumonia   Hx: HTN, COPD and alcoholism, who is a transfer from [facility] ICU to [facility] on mechanical ventilation after being found unresponsive at home    4/5: Transferred to [facility] on mechanical ventilation. Dopamine gtt off.   4/6: Pt extubated  5/7: Echo 50-55%         RN  MAP>65    *Pt likes [employer] channel 3        AOx$ and remains free of falls. No acute changes this shift. Pt has had no complaints whatsoever. Vitals have been stable through this shift. Slept well. WCTM.

## 2018-05-28 LAB — BNP SERPL-MCNC: 132 PG/ML

## 2018-05-28 PROCEDURE — 25000003 PHARM REV CODE 250: Performed by: INTERNAL MEDICINE

## 2018-05-28 PROCEDURE — 94664 DEMO&/EVAL PT USE INHALER: CPT

## 2018-05-28 PROCEDURE — 27000646 HC AEROBIKA DEVICE

## 2018-05-28 PROCEDURE — 36415 COLL VENOUS BLD VENIPUNCTURE: CPT

## 2018-05-28 PROCEDURE — 99900035 HC TECH TIME PER 15 MIN (STAT)

## 2018-05-28 PROCEDURE — 94668 MNPJ CHEST WALL SBSQ: CPT

## 2018-05-28 PROCEDURE — 83880 ASSAY OF NATRIURETIC PEPTIDE: CPT

## 2018-05-28 PROCEDURE — 92526 ORAL FUNCTION THERAPY: CPT

## 2018-05-28 PROCEDURE — 99232 SBSQ HOSP IP/OBS MODERATE 35: CPT | Mod: ,,, | Performed by: HOSPITALIST

## 2018-05-28 PROCEDURE — 27000221 HC OXYGEN, UP TO 24 HOURS

## 2018-05-28 PROCEDURE — 63600175 PHARM REV CODE 636 W HCPCS: Performed by: STUDENT IN AN ORGANIZED HEALTH CARE EDUCATION/TRAINING PROGRAM

## 2018-05-28 PROCEDURE — 94761 N-INVAS EAR/PLS OXIMETRY MLT: CPT

## 2018-05-28 PROCEDURE — 25000242 PHARM REV CODE 250 ALT 637 W/ HCPCS: Performed by: INTERNAL MEDICINE

## 2018-05-28 PROCEDURE — 94640 AIRWAY INHALATION TREATMENT: CPT

## 2018-05-28 PROCEDURE — 20600001 HC STEP DOWN PRIVATE ROOM

## 2018-05-28 PROCEDURE — 27000173 HC ACAPELLA DEVICE DH OR DM

## 2018-05-28 RX ORDER — FUROSEMIDE 10 MG/ML
20 INJECTION INTRAMUSCULAR; INTRAVENOUS ONCE
Status: COMPLETED | OUTPATIENT
Start: 2018-05-28 | End: 2018-05-28

## 2018-05-28 RX ORDER — FUROSEMIDE 10 MG/ML
20 INJECTION INTRAMUSCULAR; INTRAVENOUS ONCE
Status: DISCONTINUED | OUTPATIENT
Start: 2018-05-28 | End: 2018-05-28

## 2018-05-28 RX ADMIN — GUAIFENESIN 600 MG: 600 TABLET, EXTENDED RELEASE ORAL at 08:05

## 2018-05-28 RX ADMIN — FLUTICASONE PROPIONATE 100 MCG: 50 SPRAY, METERED NASAL at 09:05

## 2018-05-28 RX ADMIN — FUROSEMIDE 20 MG: 10 INJECTION, SOLUTION INTRAMUSCULAR; INTRAVENOUS at 01:05

## 2018-05-28 RX ADMIN — IPRATROPIUM BROMIDE AND ALBUTEROL SULFATE 3 ML: .5; 3 SOLUTION RESPIRATORY (INHALATION) at 07:05

## 2018-05-28 RX ADMIN — IPRATROPIUM BROMIDE AND ALBUTEROL SULFATE 3 ML: .5; 3 SOLUTION RESPIRATORY (INHALATION) at 01:05

## 2018-05-28 RX ADMIN — ENOXAPARIN SODIUM 40 MG: 100 INJECTION SUBCUTANEOUS at 04:05

## 2018-05-28 RX ADMIN — CETIRIZINE HYDROCHLORIDE 5 MG: 5 TABLET, FILM COATED ORAL at 09:05

## 2018-05-28 NOTE — PT/OT/SLP PROGRESS
"Speech Language Pathology Treatment    Patient Name:  Ryder Boo   MRN:  [id number]  Admitting Diagnosis: Aspiration pneumonia    Recommendations:                 General Recommendations:  Dysphagia therapy and Cognitive-linguistic therapy  Diet recommendations:  Dental Soft, Liquid Diet Level: Thin   Aspiration Precautions: Standard aspiration precautions   General Precautions: Standard, aspiration, fall  Communication strategies:  None    Subjective     "I just have to get that cough out all the time"     Pain/Comfort:  · Pain Rating 1: 0/10  · Pain Rating Post-Intervention 1: 0/10    Objective:     Has the patient been evaluated by SLP for swallowing?   Yes  Keep patient NPO? No   Current Respiratory Status: room air      Pt made NPO by medical team secondary to coughing event. Pt response to coughing event was that he is always coughing during and outside of mealtimes. Pt offered 5oz of thin liquid via open cup this visit. Pt with adequate oral control and timely AP transfer. Pt without any overt or subtle signs of aspiration with clear vocal quality following all trials of single and consecutive sips of thin liquids  from cup edge. Pt consumed 4oz container of puree without difficulty. Pt edentulous yet managing solid trials functionally. Pt with intermittent throat clear and cough with regular solid which cleared following a regular thin liquid wash. Pt reporting dry crackers always makes him cough. Pt would benefit from a diet of dental soft solids and thin liquids. Speech to continue to follow.     Assessment:     Ryder Boo is a 63 y.o. male with an SLP diagnosis of Dysphagia and Cognitive-Linguistic Impairment.      Goals:    SLP Goals        Problem: SLP Goal    Goal Priority Disciplines Outcome   SLP Goal     SLP Ongoing (interventions implemented as appropriate)   Description:  Updated Short Term Goals  Goals expected to be met by 6/6    1. Patient will tolerate dental soft solids/thin liquids with " no overt s/s of aspiration.   2.  Pt will complete functional delayed memory task with 80% accy and mod cues  3. Pt will follow complex commands with 80% accy and min cues  4. Pt will complete problem solving tasks with 90% accy and min cues  5. Pt will complete complex word finding task with 80% accy min cues                      Plan:     · Patient to be seen:  4 x/week   · Plan of Care expires:     · Plan of Care reviewed with:  patient   · SLP Follow-Up:  Yes       Discharge recommendations:  nursing facility, skilled   Barriers to Discharge:  Level of Skilled Assistance Needed      Time Tracking:     SLP Treatment Date:   05/28/18  Speech Start Time:  0928  Speech Stop Time:  0938     Speech Total Time (min):  10 min    Billable Minutes: Speech Therapy Individual 10    Kriss Plasencia CCC-SLP  05/28/2018

## 2018-05-28 NOTE — ASSESSMENT & PLAN NOTE
- Former tobacco abuse with history of COPD; concern for volume overload in ICU setting as well and underwent some diuresis. Respiratory status improved now; on 05/10 did have significant respiratory demand prompting CTA; negative for thromboembolic process.  - Continuing albuterol-ipratropium 2.5-0.5mg inhaled PRN, chest physio, acupella  - now on RA  -Last day of moxi on 5/20.   -Concern for aspiration, re-evaluated by speech 5/28 okay to continue dental soft diet with thin liquids.

## 2018-05-28 NOTE — PT/OT/SLP PROGRESS
Physical Therapy      Patient Name:  Ryder Boo   MRN:  [id number]    Patient not seen today secondary to Patient fatigue. Pt with incr fatigue and disappointment due to being unable to eat. Reports possible participation later in date.  Therapist unable to return on today.  Will follow-up on next scheduled visit.    Ana Stiles, PT, DPT  5/28/2018

## 2018-05-28 NOTE — NURSING
No acute changes over shift. VSS through out shift. Passed swallow eval and is now tolerating regular thin liquid diet.  Nebs as ordered. Suctioning q4hrs, IS encouraged qhr. Cough and deep breathing is encouraged as well. C-Xray completed today. Awaiting placement. WCTM.

## 2018-05-28 NOTE — ASSESSMENT & PLAN NOTE
"CT 5/7  "traction effects in the inferior aspect of the right middle lobe and inferior aspect of the basal segments of the right lower lobe suggesting fibrosis and consequent scarring"  Also  There are bilateral pleural calcifications peripherally and over the diaphragm consistent with asbestos related pleural disease.  Patient needs pulmonary f/u  Patient has chest congestion and cough currently, chest physiotherapy, deep suction, acupella, and mucinex ordered  "

## 2018-05-28 NOTE — PLAN OF CARE
Problem: Patient Care Overview  Goal: Plan of Care Review        No acute changes overnight. Pt alert and oriented. Continues with frequent, productive cough. Pt has been kept NPO until swallow study in AM. Vitals stable. No other issues noted. WCTM.

## 2018-05-28 NOTE — PLAN OF CARE
Pt has been accepted to [facility] and is pending insurance auth ([facility] Medicaid is closed today for Memorial Day holiday). CM left msg for pt's sister, Sejal updating her on progress of d/c plan.     05/28/18 1222   Discharge Reassessment   Assessment Type Discharge Planning Reassessment   Provided patient/caregiver education on the expected discharge date and the discharge plan Yes   Do you have any problems affording any of your prescribed medications? No   Discharge Plan A Skilled Nursing Facility   Discharge Plan B Home with family   Patient choice form signed by patient/caregiver N/A   Can the patient answer the patient profile reliably? Yes, cognitively intact   How does the patient rate their overall health at the present time? Fair   Describe the patient's ability to walk at the present time. Major restrictions/daily assistance from another person   How often would a person be available to care for the patient? Occasionally   Number of comorbid conditions (as recorded on the chart) Three

## 2018-05-28 NOTE — SUBJECTIVE & OBJECTIVE
Interval History: Patient continues to have cough productive of yellow mucous. Says SOB has improved.  Review of Systems   Constitutional: Negative for activity change, chills, fatigue and fever.   HENT: Positive for congestion. Negative for drooling, hearing loss, trouble swallowing and voice change.    Eyes: Negative for pain and visual disturbance.   Respiratory: Positive for cough. Negative for shortness of breath and wheezing.    Cardiovascular: Negative for chest pain and palpitations.   Gastrointestinal: Negative for abdominal pain, nausea and vomiting.   Genitourinary: Negative for difficulty urinating and flank pain.   Musculoskeletal: Positive for gait problem. Negative for arthralgias, back pain, myalgias and neck pain.   Skin: Negative for rash and wound.   Neurological: Negative for dizziness, weakness, numbness and headaches.   Psychiatric/Behavioral: Negative for agitation, confusion and hallucinations. The patient is not nervous/anxious.      Objective:     Vital Signs (Most Recent):  Temp: 98.2 °F (36.8 °C) (05/28/18 1217)  Pulse: 86 (05/28/18 1217)  Resp: 18 (05/28/18 1217)  BP: (!) 154/88 (05/28/18 1217)  SpO2: 96 % (05/28/18 1217) Vital Signs (24h Range):  Temp:  [96.8 °F (36 °C)-98.4 °F (36.9 °C)] 98.2 °F (36.8 °C)  Pulse:  [] 86  Resp:  [16-36] 18  SpO2:  [89 %-96 %] 96 %  BP: (132-154)/(76-88) 154/88     Weight: 74.3 kg (163 lb 12.8 oz)  Body mass index is 26.44 kg/m².    Intake/Output Summary (Last 24 hours) at 05/28/18 1236  Last data filed at 05/28/18 0556   Gross per 24 hour   Intake                0 ml   Output              650 ml   Net             -650 ml      Physical Exam   Constitutional: He appears well-developed and well-nourished. No distress.   HENT:   Head: Normocephalic and atraumatic.   Right Ear: External ear normal.   Left Ear: External ear normal.   Nose: Nose normal.   Eyes: Right eye exhibits no discharge. Left eye exhibits no discharge. No scleral icterus.   Neck:  Normal range of motion.   Cardiovascular: Normal rate, regular rhythm and intact distal pulses.    Pulmonary/Chest: Effort normal. No respiratory distress. He has no wheezes.   Diffuse rhonci   Abdominal: Soft. He exhibits no distension. There is no tenderness.   Musculoskeletal: Normal range of motion. He exhibits no edema or tenderness.   Neurological: He is alert. He exhibits normal muscle tone.   Skin: Skin is warm and dry. No rash noted.   Psychiatric: He has a normal mood and affect. His behavior is normal. His speech is not slurred. Cognition and memory are not impaired.   Vitals reviewed.      Significant Labs:   CBC:   No results for input(s): WBC, HGB, HCT, PLT in the last 48 hours.  CMP:     Recent Labs  Lab 05/27/18  0633      K 4.2      CO2 28   GLU 92   BUN 14   CREATININE 0.8   CALCIUM 9.4   ANIONGAP 8   EGFRNONAA >60.0     All pertinent labs within the past 24 hours have been reviewed.    Significant Imaging: I have reviewed and interpreted all pertinent imaging results/findings within the past 24 hours.

## 2018-05-28 NOTE — PLAN OF CARE
11:27 AM  SW spoke w/Robert the DON and she reported Pt is still in  pending financial auth status.  Stated to phone back tomorrow as insurance company not open today.      DENISE Maria  [facility]

## 2018-05-28 NOTE — PROGRESS NOTES
[facility] Hospital Medicine  Progress Note    Patient Name: Ryder Boo  MRN: [id number]  Patient Class: IP- Inpatient   Admission Date: 5/6/2018  Length of Stay: 22 days  Attending Physician: Shaylee Chao MD  Primary Care Provider: Swapnil Heart MD    [facility]: [facility] HOSP MED 4 Eladia Barbour MD    Subjective:     Principal Problem:Aspiration pneumonia    HPI:  The patient is a 64 y/o M new to our system, with HTN, COPD and alcoholism, who is transfer from [facility] ICU to [facility] on mechanical ventilation.     The patient lives with his brother across the street from his sister. He was found unresponsive in a chair with small amount of blood in his mouth by his nephew and taken to the hospital by EMS on 5/3. Patient drinks every day and has been having frequent falls recently. Per sister he has quit smoking 15 years ago.  In the ED at OSH he was found to be hypoxic and hypotensive, with undetectable EtOH levels, leukocytosis (24K) and hyponatremia (119) in the labs. he received narcan x1, IV fluids, started on metronidazole and ceftriaxone (also x1 of zosyn, azithromycin, vancomycin, ceftaroline) for aspiration pneumonia, and was put on 15L of Oxygen with non-rebreather, which improved his mental status and BP, however on 4/5 upon weaning off of oxygen he developed hypoxic hypercapnic respiratory failure and subsequent acidosis (pH 7.10), therefore he was intubated and transferred to the ICU. Patient arrived to [facility] on dopamine gtt which per notes was started to maintain BP in the setting of hyponatremia.     Hospital Course:  5/6: upon arrival, patient was taken off of dopamine gtt and received 1.5L IV fluids to maintain MAPs. Continued on vanc/zosyn/azithromycin. Hyponatremia and ROHITH continued to improved with IVF. He was extubated in early afternoon and maintained normal O2 saturations on 3-4L oxygen on NC.   5/7: early morning he was noticed to be rattling while  breathing by the nurse. Upon examination he had increased work of breathing with worsening of bilateral rhonchi. Repeat CXR revealed interval worsening of the patchy infiltrations. Received x1 furosemide 60 mg IV and transitioned to high flow oxygen for possible development of ARDS, he is currently maintaining normal saturations, will keep on intubation watch.  5/8: several episodes of desaturation down to 70% overnight and early morning, that responded well to increasing oxygen to 100%, currently on 35L/50%, mildly drowsy due to precedex. Responded well to diuretics, net -2.8L. Will discontinue precedex and will have speech evaluate his swallowing.  5/9: Uneventful night off precedex. Remains confused, oriented to person and place. Oxygen requirements greatly improved 15L/50% with plan to wean to low-flow NC today. Diuresed -2.9L yesterday and discontinued lasix. Discontinued azithromycin and continued on zosyn. Failed speech eval by SLP and remains NPO  5/11-5/12 patient had NG tube placed, removed it and re-ordered on gentle tube feeds. On high o2 support at night as patient cannot have bipap due to NG.  05/14/2018 -Patient passed swallow eval to full liquids. He is now sating 100 % RA family updated awaiting rehab evaluation   05/17/2018 Patient was desating in the morning to 85-88 but he was alert oriented and no complaints gave him a breathing treatment   05/18/2018 awaiting placement  05/19/2018 CT, requested by rehab to r/o stroke, shows mild signs of possible NPH. Will order f/u o/p neuro. Patient reports x3 BMs, will see if continues. Will complete 1 more day of moxi.   05/20/2018 no acute events overnight patient on last day of moxi  Subsequently  Patient has been awaiting placement, added chest physiotherapy, deep suction, and acupella for chest congestion; also for upper airway congestion, added flonase and mucinex; procal negative for pna, cxr 5/28 with some superimpose edema, speech re-eval for  aspiration, okay for dental soft diet.    Interval History: Patient continues to have cough productive of yellow mucous. Says SOB has improved.  Review of Systems   Constitutional: Negative for activity change, chills, fatigue and fever.   HENT: Positive for congestion. Negative for drooling, hearing loss, trouble swallowing and voice change.    Eyes: Negative for pain and visual disturbance.   Respiratory: Positive for cough. Negative for shortness of breath and wheezing.    Cardiovascular: Negative for chest pain and palpitations.   Gastrointestinal: Negative for abdominal pain, nausea and vomiting.   Genitourinary: Negative for difficulty urinating and flank pain.   Musculoskeletal: Positive for gait problem. Negative for arthralgias, back pain, myalgias and neck pain.   Skin: Negative for rash and wound.   Neurological: Negative for dizziness, weakness, numbness and headaches.   Psychiatric/Behavioral: Negative for agitation, confusion and hallucinations. The patient is not nervous/anxious.      Objective:     Vital Signs (Most Recent):  Temp: 98.2 °F (36.8 °C) (05/28/18 1217)  Pulse: 86 (05/28/18 1217)  Resp: 18 (05/28/18 1217)  BP: (!) 154/88 (05/28/18 1217)  SpO2: 96 % (05/28/18 1217) Vital Signs (24h Range):  Temp:  [96.8 °F (36 °C)-98.4 °F (36.9 °C)] 98.2 °F (36.8 °C)  Pulse:  [] 86  Resp:  [16-36] 18  SpO2:  [89 %-96 %] 96 %  BP: (132-154)/(76-88) 154/88     Weight: 74.3 kg (163 lb 12.8 oz)  Body mass index is 26.44 kg/m².    Intake/Output Summary (Last 24 hours) at 05/28/18 1236  Last data filed at 05/28/18 0556   Gross per 24 hour   Intake                0 ml   Output              650 ml   Net             -650 ml      Physical Exam   Constitutional: He appears well-developed and well-nourished. No distress.   HENT:   Head: Normocephalic and atraumatic.   Right Ear: External ear normal.   Left Ear: External ear normal.   Nose: Nose normal.   Eyes: Right eye exhibits no discharge. Left eye exhibits  "no discharge. No scleral icterus.   Neck: Normal range of motion.   Cardiovascular: Normal rate, regular rhythm and intact distal pulses.    Pulmonary/Chest: Effort normal. No respiratory distress. He has no wheezes.   Diffuse rhonci   Abdominal: Soft. He exhibits no distension. There is no tenderness.   Musculoskeletal: Normal range of motion. He exhibits no edema or tenderness.   Neurological: He is alert. He exhibits normal muscle tone.   Skin: Skin is warm and dry. No rash noted.   Psychiatric: He has a normal mood and affect. His behavior is normal. His speech is not slurred. Cognition and memory are not impaired.   Vitals reviewed.      Significant Labs:   CBC:   No results for input(s): WBC, HGB, HCT, PLT in the last 48 hours.  CMP:     Recent Labs  Lab 05/27/18  0633      K 4.2      CO2 28   GLU 92   BUN 14   CREATININE 0.8   CALCIUM 9.4   ANIONGAP 8   EGFRNONAA >60.0     All pertinent labs within the past 24 hours have been reviewed.    Significant Imaging: I have reviewed and interpreted all pertinent imaging results/findings within the past 24 hours.    Assessment/Plan:      * Aspiration pneumonia    - At OSH had significant leukocytosis with WBCs to 38K.  - CXR with bilateral multifocal infiltration.  - Suspect aspiration secondary to AMS.  -Completed course of Moxi. Last day was 5/20.         Acute respiratory failure with hypoxia and hypercapnia    - Former tobacco abuse with history of COPD; concern for volume overload in ICU setting as well and underwent some diuresis. Respiratory status improved now; on 05/10 did have significant respiratory demand prompting CTA; negative for thromboembolic process.  - Continuing albuterol-ipratropium 2.5-0.5mg inhaled PRN, chest physio, acupella  - now on RA  -Last day of moxi on 5/20.   -Concern for aspiration, re-evaluated by speech 5/28 okay to continue dental soft diet with thin liquids.          Fibrosis of lung    CT 5/7  "traction effects in the " "inferior aspect of the right middle lobe and inferior aspect of the basal segments of the right lower lobe suggesting fibrosis and consequent scarring"  Also  There are bilateral pleural calcifications peripherally and over the diaphragm consistent with asbestos related pleural disease.  Patient needs pulmonary f/u  Patient has chest congestion and cough currently, chest physiotherapy, deep suction, acupella, and mucinex ordered        Gait disturbance    CTH 5/18 with Moderate diffuse dilation of the ventricular system, possibly normal pressure hydrocephalus.  Will need outpatient neurology f/u        Pressure injury of skin and subcutaneous tissue, stage 3    Stage 3 pressure injury with small amount of slough noted to patient's sacral spine. (0.4cm x 0.4cm x 0.2cm)   Wound Care consulted  Recommend applying triad barrier cream qshift and as needed.   Turn every 2hrs. Low airloss overlay ordered for patient. Nursing to continue care.          Moderate malnutrition    - continue boost and dental soft diet per speech          Anemia    - Hgb stable; some concern for Hgb drop as outpatient. FOBT performed and positive; no gross evidence of bleed.          Alcoholism /alcohol abuse    - History of alcohol abuse; continuing folic acid, thiamine daily.          VTE Risk Mitigation         Ordered     enoxaparin injection 40 mg  Daily      05/06/18 1016     Place sequential compression device  Until discontinued      05/06/18 0113     IP VTE LOW RISK PATIENT  Once      05/06/18 0113              Eladia Barbour MD  Department of Hospital Medicine   [facility]  "

## 2018-05-29 LAB
ANION GAP SERPL CALC-SCNC: 8 MMOL/L
BUN SERPL-MCNC: 16 MG/DL
CALCIUM SERPL-MCNC: 9 MG/DL
CHLORIDE SERPL-SCNC: 101 MMOL/L
CO2 SERPL-SCNC: 27 MMOL/L
CREAT SERPL-MCNC: 0.8 MG/DL
EST. GFR  (AFRICAN AMERICAN): >60 ML/MIN/1.73 M^2
EST. GFR  (NON AFRICAN AMERICAN): >60 ML/MIN/1.73 M^2
GLUCOSE SERPL-MCNC: 85 MG/DL
MAGNESIUM SERPL-MCNC: 1.5 MG/DL
POTASSIUM SERPL-SCNC: 3.7 MMOL/L
SODIUM SERPL-SCNC: 136 MMOL/L

## 2018-05-29 PROCEDURE — 20600001 HC STEP DOWN PRIVATE ROOM

## 2018-05-29 PROCEDURE — 99232 SBSQ HOSP IP/OBS MODERATE 35: CPT | Mod: ,,, | Performed by: HOSPITALIST

## 2018-05-29 PROCEDURE — 80048 BASIC METABOLIC PNL TOTAL CA: CPT

## 2018-05-29 PROCEDURE — 94640 AIRWAY INHALATION TREATMENT: CPT

## 2018-05-29 PROCEDURE — 94761 N-INVAS EAR/PLS OXIMETRY MLT: CPT

## 2018-05-29 PROCEDURE — 25000242 PHARM REV CODE 250 ALT 637 W/ HCPCS: Performed by: INTERNAL MEDICINE

## 2018-05-29 PROCEDURE — 36415 COLL VENOUS BLD VENIPUNCTURE: CPT

## 2018-05-29 PROCEDURE — 25000003 PHARM REV CODE 250: Performed by: INTERNAL MEDICINE

## 2018-05-29 PROCEDURE — 27000646 HC AEROBIKA DEVICE

## 2018-05-29 PROCEDURE — 97530 THERAPEUTIC ACTIVITIES: CPT

## 2018-05-29 PROCEDURE — 83735 ASSAY OF MAGNESIUM: CPT

## 2018-05-29 PROCEDURE — 94668 MNPJ CHEST WALL SBSQ: CPT

## 2018-05-29 PROCEDURE — 94664 DEMO&/EVAL PT USE INHALER: CPT

## 2018-05-29 PROCEDURE — 63600175 PHARM REV CODE 636 W HCPCS: Performed by: STUDENT IN AN ORGANIZED HEALTH CARE EDUCATION/TRAINING PROGRAM

## 2018-05-29 RX ORDER — CETIRIZINE HYDROCHLORIDE 5 MG/1
5 TABLET ORAL DAILY
Refills: 0 | COMMUNITY
Start: 2018-05-30 | End: 2019-05-30

## 2018-05-29 RX ORDER — FLUTICASONE PROPIONATE 50 MCG
1 SPRAY, SUSPENSION (ML) NASAL DAILY
Qty: 9.9 ML | Refills: 2 | Status: SHIPPED | OUTPATIENT
Start: 2018-05-30

## 2018-05-29 RX ORDER — GUAIFENESIN 600 MG/1
600 TABLET, EXTENDED RELEASE ORAL 2 TIMES DAILY
Qty: 20 TABLET | Refills: 0 | COMMUNITY
Start: 2018-05-29 | End: 2018-06-08

## 2018-05-29 RX ADMIN — IPRATROPIUM BROMIDE AND ALBUTEROL SULFATE 3 ML: .5; 3 SOLUTION RESPIRATORY (INHALATION) at 01:05

## 2018-05-29 RX ADMIN — THIAMINE HCL TAB 100 MG 100 MG: 100 TAB at 09:05

## 2018-05-29 RX ADMIN — GUAIFENESIN 600 MG: 600 TABLET, EXTENDED RELEASE ORAL at 09:05

## 2018-05-29 RX ADMIN — GUAIFENESIN 600 MG: 600 TABLET, EXTENDED RELEASE ORAL at 08:05

## 2018-05-29 RX ADMIN — FLUTICASONE PROPIONATE 100 MCG: 50 SPRAY, METERED NASAL at 09:05

## 2018-05-29 RX ADMIN — IPRATROPIUM BROMIDE AND ALBUTEROL SULFATE 3 ML: .5; 3 SOLUTION RESPIRATORY (INHALATION) at 12:05

## 2018-05-29 RX ADMIN — IPRATROPIUM BROMIDE AND ALBUTEROL SULFATE 3 ML: .5; 3 SOLUTION RESPIRATORY (INHALATION) at 09:05

## 2018-05-29 RX ADMIN — ENOXAPARIN SODIUM 40 MG: 100 INJECTION SUBCUTANEOUS at 05:05

## 2018-05-29 RX ADMIN — CETIRIZINE HYDROCHLORIDE 5 MG: 5 TABLET, FILM COATED ORAL at 09:05

## 2018-05-29 RX ADMIN — FOLIC ACID 1 MG: 1 TABLET ORAL at 09:05

## 2018-05-29 RX ADMIN — IPRATROPIUM BROMIDE AND ALBUTEROL SULFATE 3 ML: .5; 3 SOLUTION RESPIRATORY (INHALATION) at 07:05

## 2018-05-29 NOTE — PT/OT/SLP PROGRESS
Occupational Therapy      Patient Name:  Ryder Boo   MRN:  [id number]    Patient not seen today secondary to had a BM just prior to sitting EOB. Will follow-up per schedule.    FRANCA Tavarez  5/29/2018

## 2018-05-29 NOTE — PT/OT/SLP PROGRESS
Physical Therapy Treatment    Patient Name:  Ryder Boo   MRN:  [id number]    Recommendations:     Discharge Recommendations:  nursing facility, skilled   Discharge Equipment Recommendations: bedside commode, walker, rolling, shower chair   Barriers to discharge: Decreased caregiver support    Assessment:     Ryder Boo is a 63 y.o. male admitted with a medical diagnosis of Aspiration pneumonia.  He presents with the following impairments/functional limitations:  impaired endurance, impaired self care skills, impaired functional mobilty, impaired balance, weakness, gait instability, impaired cognition, decreased safety awareness, impaired cardiopulmonary response to activity. Pt tolerated treatment well, and will continue to benefit from skilled PT services at this time to improve all deficits noted above. Continue with PT POC as indicated.     Rehab Prognosis:  Good; patient would benefit from acute skilled PT services to address these deficits and reach maximum level of function.      Recent Surgery: * No surgery found *      Plan:     During this hospitalization, patient to be seen 4 x/week to address the above listed problems via gait training, therapeutic activities, neuromuscular re-education, therapeutic exercises  · Plan of Care Expires:  06/06/18   Plan of Care Reviewed with: patient    Subjective     Communicated with nursing prior to session.  Patient found supine upon PT entry to room, agreeable to treatment.      Chief Complaint: none stated  Patient comments/goals: none stated  Pain/Comfort:  · Pain Rating 1: 0/10  · Pain Rating Post-Intervention 1: 0/10    Patients cultural, spiritual, Judaism conflicts given the current situation: none stated    Objective:     Patient found with: bed alarm     General Precautions: Standard, fall, aspiration   Orthopedic Precautions:N/A   Braces: N/A     Functional Mobility:  · Bed Mobility:  Scooting: contact guard assistance  · Supine to Sit: contact guard  assistance  · Transfers:  Sit to Stand:  minimum assistance with rolling walker  · Bed to Chair: minimum assistance with  rolling walker  using  Stand Pivot      AM-PAC 6 CLICK MOBILITY  Turning over in bed (including adjusting bedclothes, sheets and blankets)?: 3  Sitting down on and standing up from a chair with arms (e.g., wheelchair, bedside commode, etc.): 3  Moving from lying on back to sitting on the side of the bed?: 3  Moving to and from a bed to a chair (including a wheelchair)?: 2  Need to walk in hospital room?: 2  Climbing 3-5 steps with a railing?: 1  Total Score: 14       Therapeutic Activities and Exercises:   -Seated B LE therex x20 reps: AP,LAQ, and Hip Flexion    Patient left up in chair with call button in reach, chair alarm on and nursing notified..    GOALS:    Physical Therapy Goals        Problem: Physical Therapy Goal    Goal Priority Disciplines Outcome Goal Variances Interventions   Physical Therapy Goal     PT/OT, PT Ongoing (interventions implemented as appropriate)     Description:  Goals to be met by: 2018    Patient will increase functional independence with mobility by performin. Revised: Supine to sit with CGA- Met 2018  2. Revised: Sit to supine with CGA  3. Revised: Sit-stand transfer with CGA using LRAD  4.Revised: Bed-chair transfer with Min Assist using LRAD MET  5. Gait  x 20 feet with Moderate Assistance using LRAD or no AD - not met  6. Lower extremity exercise program x20 reps per handout, with supervision - met                            Time Tracking:     PT Received On: 18  PT Start Time: 1358     PT Stop Time: 1415  PT Total Time (min): 17 min     Billable Minutes: Therapeutic Activity 17    Treatment Type: Treatment  PT/PTA: PTA     PTA Visit Number: 2     Elysia Cabrales, PTA  2018

## 2018-05-29 NOTE — PLAN OF CARE
Problem: SLP Goal  Goal: SLP Goal  Updated Short Term Goals  Goals expected to be met by 6/6    1. Patient will tolerate dental soft solids/thin liquids with no overt s/s of aspiration.   2.  Pt will complete functional delayed memory task with 80% accy and mod cues  3. Pt will follow complex commands with 80% accy and min cues  4. Pt will complete problem solving tasks with 90% accy and min cues  5. Pt will complete complex word finding task with 80% accy min cues     Outcome: Outcome(s) achieved Date Met: 05/29/18    Pt with good progress toward goals. Tolerating diet and appearing at or near cognitive linguistic baseline     Kriss Plasencia MS, CCC-SLP  Speech Language Pathologist  Pager: [phone]  Date 5/29/2018

## 2018-05-29 NOTE — PLAN OF CARE
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]  Yes     Priority: 3     Gait disturbance [R26.9]  Unknown    Impaired mobility and ADLs [Z74.09]  Unknown    Pressure injury of skin and subcutaneous tissue, stage 3 [L89.93]  Yes    Moderate malnutrition [E44.0]  Yes    Anemia [D64.9]  Yes    Alcoholism /alcohol abuse [F10.20]  Yes      Resolved Hospital Problems    Diagnosis Date Resolved POA    Hypernatremia [E87.0] 05/21/2018 Yes    Encephalopathy, metabolic [G93.41] 05/21/2018 Yes    Hypomagnesemia [E83.42] 05/22/2018 No    Hyponatremia [E87.1] 05/08/2018 Yes    ROHITH (acute kidney injury) [N17.9] 05/21/2018 Yes    Sepsis [A41.9] 05/21/2018 Yes       Patient is homebound due to:  Aspiration pneumonia    Allergies:  Review of patient's allergies indicates:   Allergen Reactions    Pneumococcal 23-whitney ps vaccine      Pt confused, cannot recall reaction symptoms       Vitals:   Every shift (Skilled Nursing patients)    Diet: Dental Soft diet with Thin Liquids    Acitivities:   As tolerated   - Up in a chair each morning as tolerated   - Ambulate with assistance to bathroom        LABS:  Per facility protocol    Nursing Precautions:     - Aspiration precautions:             - Total assistance with meals            -  Upright 90 degrees befor during and after meals             -  Suction at bedside          - Fall precautions per nursing home protocol    - Decubitus precautions:        -  for positioning   - Pressure reducing  foam mattress   - Turn patient every two hours. Use wedge pillows to anchor patient    CONSULTS:       Physical Therapy to evaluate and treat     Occupational Therapy to evaluate and treat     Speech Therapy  to evaluate and treat     Nutrition to evaluate and recommend diet     Psychiatry to evaluate and follow patients for delirium    MISCELLANEOUS CARE:             Routine Skin for Bedridden Patients:  Apply moisture barrier cream to all    skin folds and wet areas in perineal area daily and after baths and                           all bowel movements.   Respiratory and Nursing Care: Deep Suction q6hr and suction at bedside                Medications:      Ema Ryder   Home Medication Instructions NERISSA:[id number]    Printed on:05/29/18 9970   Medication Information                      albuterol-ipratropium (DUO-NEB) 2.5 mg-0.5 mg/3 mL nebulizer solution  Take 3 mLs by nebulization every 4 (four) hours as needed for Wheezing or Shortness of Breath. Rescue             cetirizine (ZYRTEC) 5 MG tablet  Take 1 tablet (5 mg total) by mouth once daily.             chlorthalidone (HYGROTEN) 25 MG Tab  Take 25 mg by mouth once daily.             fluticasone (FLONASE) 50 mcg/actuation nasal spray  1 spray (50 mcg total) by Each Nare route once daily.             folic acid (FOLVITE) 1 MG tablet  Take 1 tablet (1 mg total) by mouth once daily.             guaiFENesin (MUCINEX) 600 mg 12 hr tablet  Take 1 tablet (600 mg total) by mouth 2 (two) times daily.             ibuprofen (ADVIL,MOTRIN) 200 MG tablet  Take 200 mg by mouth daily as needed (headache).             thiamine 100 MG tablet  Take 1 tablet (100 mg total) by mouth once daily.                       _________________________________  Eladia Barbour MD  06/01/2018

## 2018-05-29 NOTE — SUBJECTIVE & OBJECTIVE
Interval History: Patient cough and congestion improved.    Review of Systems   Constitutional: Negative for activity change, chills, fatigue and fever.   HENT: Positive for congestion. Negative for drooling, hearing loss, trouble swallowing and voice change.    Eyes: Negative for pain and visual disturbance.   Respiratory: Positive for cough. Negative for shortness of breath and wheezing.    Cardiovascular: Negative for chest pain and palpitations.   Gastrointestinal: Negative for abdominal pain, nausea and vomiting.   Genitourinary: Negative for difficulty urinating and flank pain.   Musculoskeletal: Positive for gait problem. Negative for arthralgias, back pain, myalgias and neck pain.   Skin: Negative for rash and wound.   Neurological: Negative for dizziness, weakness, numbness and headaches.   Psychiatric/Behavioral: Negative for agitation, confusion and hallucinations. The patient is not nervous/anxious.      Objective:     Vital Signs (Most Recent):  Temp: 98.8 °F (37.1 °C) (05/29/18 0409)  Pulse: 103 (05/29/18 0903)  Resp: 20 (05/29/18 0903)  BP: 125/74 (05/29/18 0930)  SpO2: 95 % (05/29/18 0930) Vital Signs (24h Range):  Temp:  [98 °F (36.7 °C)-98.8 °F (37.1 °C)] 98.8 °F (37.1 °C)  Pulse:  [] 103  Resp:  [16-20] 20  SpO2:  [92 %-97 %] 95 %  BP: (125-154)/(69-88) 125/74     Weight: 78.5 kg (173 lb 1 oz)  Body mass index is 27.93 kg/m².    Intake/Output Summary (Last 24 hours) at 05/29/18 1046  Last data filed at 05/29/18 0549   Gross per 24 hour   Intake                0 ml   Output              850 ml   Net             -850 ml      Physical Exam   Constitutional: He appears well-developed and well-nourished. No distress.   HENT:   Head: Normocephalic and atraumatic.   Right Ear: External ear normal.   Left Ear: External ear normal.   Nose: Nose normal.   Eyes: Right eye exhibits no discharge. Left eye exhibits no discharge. No scleral icterus.   Neck: Normal range of motion.   Cardiovascular: Normal  rate, regular rhythm and intact distal pulses.    Pulmonary/Chest: Effort normal. No respiratory distress. He has no wheezes.   Insp crackles in RLL and LML and LLL   Abdominal: Soft. He exhibits no distension. There is no tenderness.   Musculoskeletal: Normal range of motion. He exhibits no edema or tenderness.   Neurological: He is alert. He exhibits normal muscle tone.   Skin: Skin is warm and dry. No rash noted.   Psychiatric: He has a normal mood and affect. His behavior is normal. His speech is not slurred. Cognition and memory are not impaired.   Vitals reviewed.      Significant Labs:   CBC:   No results for input(s): WBC, HGB, HCT, PLT in the last 48 hours.  CMP:     Recent Labs  Lab 05/29/18  0630      K 3.7      CO2 27   GLU 85   BUN 16   CREATININE 0.8   CALCIUM 9.0   ANIONGAP 8   EGFRNONAA >60.0     All pertinent labs within the past 24 hours have been reviewed.    Significant Imaging: I have reviewed and interpreted all pertinent imaging results/findings within the past 24 hours.

## 2018-05-29 NOTE — PLAN OF CARE
Problem: Patient Care Overview  Goal: Plan of Care Review  Dx: Pneumonia   Hx: HTN, COPD and alcoholism, who is a transfer from [facility] ICU to [facility] on mechanical ventilation after being found unresponsive at home    4/5: Transferred to [facility] on mechanical ventilation. Dopamine gtt off.   4/6: Pt extubated  5/7: Echo 50-55%         RN  MAP>65    *Pt likes [employer] channel 3        Outcome: Ongoing (interventions implemented as appropriate)  VSS. No complaints of pain.  A/Ox4.  No acute events over night. Safety maintained.  All questions answered. Patient updated on plan of care.  Will continue to monitor.

## 2018-05-29 NOTE — PT/OT/SLP PROGRESS
"Speech Language Pathology Treatment/ Discharge Summary     Patient Name:  Ryder Boo   MRN:  [id number]  Admitting Diagnosis: Aspiration pneumonia    Recommendations:                 General Recommendations:  Follow-up not indicated  Diet recommendations:  Dental Soft, Liquid Diet Level: Thin   Aspiration Precautions: Standard aspiration precautions   General Precautions: Standard, aspiration, fall  Communication strategies:  none    Subjective     "Hey baby" Pt awake alert and willingly cooperative for therapy tasks     Pain/Comfort:  · Pain Rating 1: 0/10  · Pain Rating Post-Intervention 1: 0/10    Objective:     Has the patient been evaluated by SLP for swallowing?   Yes  Keep patient NPO? No   Current Respiratory Status: room air      Pt tolerating diet of soft solids and thin liquids without overt clinical signs of aspiration. Pt demonstrated adequate swallow function to consume regular thin liquids across 4oz of water this visit. Pt followed complex commands independently with 100% acc.  Pt recalled 3/3 items immediately and 1/3 items with min cues with a delay. Pt reports memory deficits at baseline. Pt completed problem solving tasks independently with 100% acc. Pt completed abstract word finding tasks with 100% acc independently.      Assessment:     Ryder Boo is a 63 y.o. male with an SLP diagnosis of resolved dysphagia and appearing at or near baseline cognitivie-linguistic skills.      Goals:    SLP Goals     Not on file          Multidisciplinary Problems (Resolved)        Problem: SLP Goal    Goal Priority Disciplines Outcome   SLP Goal   (Resolved)     SLP Outcome(s) achieved   Description:  Updated Short Term Goals  Goals expected to be met by 6/6    1. Patient will tolerate dental soft solids/thin liquids with no overt s/s of aspiration.   2.  Pt will complete functional delayed memory task with 80% accy and mod cues  3. Pt will follow complex commands with 80% accy and min cues  4. Pt will " complete problem solving tasks with 90% accy and min cues  5. Pt will complete complex word finding task with 80% accy min cues                      Plan:     · Patient to be seen:  4 x/week   · Plan of Care expires:     · Plan of Care reviewed with:  patient   · SLP Follow-Up:  No       Discharge recommendations:  nursing facility, skilled   Barriers to Discharge:  None per ST     Time Tracking:     SLP Treatment Date:   05/29/18  Speech Start Time:  0842  Speech Stop Time:  0850     Speech Total Time (min):  8 min    Billable Minutes: Speech Therapy Individual 8    Kriss Plasencia CCC-SLP  05/29/2018

## 2018-05-29 NOTE — PLAN OF CARE
9:31 AM  EDUAR spoke w/admin. rep Aaliyah, at [facility] [phone].  She reported they are still waiting for autho from Pt's insurance provider.  Provided Aaliyah w/[employer]'s name and # and asked her to notify [employer] [facility] when they have the autho.    EDUAR following for DC needs.  EDUAR in communication with GIOVANNI Murray, MSW  [facility]

## 2018-05-29 NOTE — PLAN OF CARE
CM received call from pt's sister, Sejal - she will be to pt's bedside at some point during the day tmw as she will be in [place] w/ her son for an MD appt. CM provided d/c plan update that pt's transfer to SNF is pending insurance auth/approval.    Update: CM received another phone call from pt's sister, Sejal - she stated that she had received a call from [facility] but has not returned call and is expected to be in [place] tmw for her son's appt. CM encouraged her to call facility today as they may want a local family member to complete paperwork, etc and she could make those arrangement ASAP.

## 2018-05-29 NOTE — PROGRESS NOTES
[facility] Hospital Medicine  Progress Note    Patient Name: Ryder Boo  MRN: [id number]  Patient Class: IP- Inpatient   Admission Date: 5/6/2018  Length of Stay: 23 days  Attending Physician: Shaylee Chao MD  Primary Care Provider: Swapnil Heart MD    [facility] Medicine Team: [facility] 4 Eladia Barbour MD    Subjective:     Principal Problem:Aspiration pneumonia    HPI:  The patient is a 62 y/o M new to our system, with HTN, COPD and alcoholism, who is transfer from [facility] ICU to [facility] mechanical ventilation.     The patient lives with his brother across the street from his sister. He was found unresponsive in a chair with small amount of blood in his mouth by his nephew and taken to the hospital by EMS on 5/3. Patient drinks every day and has been having frequent falls recently. Per sister he has quit smoking 15 years ago.  In the ED at OSH he was found to be hypoxic and hypotensive, with undetectable EtOH levels, leukocytosis (24K) and hyponatremia (119) in the labs. he received narcan x1, IV fluids, started on metronidazole and ceftriaxone (also x1 of zosyn, azithromycin, vancomycin, ceftaroline) for aspiration pneumonia, and was put on 15L of Oxygen with non-rebreather, which improved his mental status and BP, however on 4/5 upon weaning off of oxygen he developed hypoxic hypercapnic respiratory failure and subsequent acidosis (pH 7.10), therefore he was intubated and transferred to the ICU. Patient arrived to [facility] on dopamine gtt which per notes was started to maintain BP in the setting of hyponatremia.     Hospital Course:  5/6: upon arrival, patient was taken off of dopamine gtt and received 1.5L IV fluids to maintain MAPs. Continued on vanc/zosyn/azithromycin. Hyponatremia and ROHITH continued to improved with IVF. He was extubated in early afternoon and maintained normal O2 saturations on 3-4L oxygen on NC.   5/7: early morning he was noticed to be rattling while  breathing by the nurse. Upon examination he had increased work of breathing with worsening of bilateral rhonchi. Repeat CXR revealed interval worsening of the patchy infiltrations. Received x1 furosemide 60 mg IV and transitioned to high flow oxygen for possible development of ARDS, he is currently maintaining normal saturations, will keep on intubation watch.  5/8: several episodes of desaturation down to 70% overnight and early morning, that responded well to increasing oxygen to 100%, currently on 35L/50%, mildly drowsy due to precedex. Responded well to diuretics, net -2.8L. Will discontinue precedex and will have speech evaluate his swallowing.  5/9: Uneventful night off precedex. Remains confused, oriented to person and place. Oxygen requirements greatly improved 15L/50% with plan to wean to low-flow NC today. Diuresed -2.9L yesterday and discontinued lasix. Discontinued azithromycin and continued on zosyn. Failed speech eval by SLP and remains NPO  5/11-5/12 patient had NG tube placed, removed it and re-ordered on gentle tube feeds. On high o2 support at night as patient cannot have bipap due to NG.  05/14/2018 -Patient passed swallow eval to full liquids. He is now sating 100 % RA family updated awaiting rehab evaluation   05/17/2018 Patient was desating in the morning to 85-88 but he was alert oriented and no complaints gave him a breathing treatment   05/18/2018 awaiting placement  05/19/2018 CT, requested by rehab to r/o stroke, shows mild signs of possible NPH. Will order f/u o/p neuro. Patient reports x3 BMs, will see if continues. Will complete 1 more day of moxi.   05/20/2018 no acute events overnight patient on last day of moxi  Subsequently  Patient has been awaiting placement, added chest physiotherapy, deep suction, and acupella for chest congestion; also for upper airway congestion, added flonase and mucinex; procal negative for pna, cxr 5/28 with some superimpose edema, speech re-eval for  aspiration, okay for dental soft diet. Given one dose of 20mg IV lasix    Interval History: Patient cough and congestion improved.    Review of Systems   Constitutional: Negative for activity change, chills, fatigue and fever.   HENT: Positive for congestion. Negative for drooling, hearing loss, trouble swallowing and voice change.    Eyes: Negative for pain and visual disturbance.   Respiratory: Positive for cough. Negative for shortness of breath and wheezing.    Cardiovascular: Negative for chest pain and palpitations.   Gastrointestinal: Negative for abdominal pain, nausea and vomiting.   Genitourinary: Negative for difficulty urinating and flank pain.   Musculoskeletal: Positive for gait problem. Negative for arthralgias, back pain, myalgias and neck pain.   Skin: Negative for rash and wound.   Neurological: Negative for dizziness, weakness, numbness and headaches.   Psychiatric/Behavioral: Negative for agitation, confusion and hallucinations. The patient is not nervous/anxious.      Objective:     Vital Signs (Most Recent):  Temp: 98.8 °F (37.1 °C) (05/29/18 0409)  Pulse: 103 (05/29/18 0903)  Resp: 20 (05/29/18 0903)  BP: 125/74 (05/29/18 0930)  SpO2: 95 % (05/29/18 0930) Vital Signs (24h Range):  Temp:  [98 °F (36.7 °C)-98.8 °F (37.1 °C)] 98.8 °F (37.1 °C)  Pulse:  [] 103  Resp:  [16-20] 20  SpO2:  [92 %-97 %] 95 %  BP: (125-154)/(69-88) 125/74     Weight: 78.5 kg (173 lb 1 oz)  Body mass index is 27.93 kg/m².    Intake/Output Summary (Last 24 hours) at 05/29/18 1046  Last data filed at 05/29/18 0549   Gross per 24 hour   Intake                0 ml   Output              850 ml   Net             -850 ml      Physical Exam   Constitutional: He appears well-developed and well-nourished. No distress.   HENT:   Head: Normocephalic and atraumatic.   Right Ear: External ear normal.   Left Ear: External ear normal.   Nose: Nose normal.   Eyes: Right eye exhibits no discharge. Left eye exhibits no discharge. No  "scleral icterus.   Neck: Normal range of motion.   Cardiovascular: Normal rate, regular rhythm and intact distal pulses.    Pulmonary/Chest: Effort normal. No respiratory distress. He has no wheezes.   Insp crackles in RLL and LML and LLL   Abdominal: Soft. He exhibits no distension. There is no tenderness.   Musculoskeletal: Normal range of motion. He exhibits no edema or tenderness.   Neurological: He is alert. He exhibits normal muscle tone.   Skin: Skin is warm and dry. No rash noted.   Psychiatric: He has a normal mood and affect. His behavior is normal. His speech is not slurred. Cognition and memory are not impaired.   Vitals reviewed.      Significant Labs:   CBC:   No results for input(s): WBC, HGB, HCT, PLT in the last 48 hours.  CMP:     Recent Labs  Lab 05/29/18  0630      K 3.7      CO2 27   GLU 85   BUN 16   CREATININE 0.8   CALCIUM 9.0   ANIONGAP 8   EGFRNONAA >60.0     All pertinent labs within the past 24 hours have been reviewed.    Significant Imaging: I have reviewed and interpreted all pertinent imaging results/findings within the past 24 hours.    Assessment/Plan:      * Aspiration pneumonia    - At OSH had significant leukocytosis with WBCs to 38K.  - CXR with bilateral multifocal infiltration.  - Suspect aspiration secondary to AMS.  -Completed course of Moxi. Last day was 5/20.         Acute respiratory failure with hypoxia and hypercapnia    - Former tobacco abuse with history of COPD; concern for volume overload in ICU setting as well and underwent some diuresis. Respiratory status improved now; on 05/10 did have significant respiratory demand prompting CTA; negative for thromboembolic process.  - Continuing albuterol-ipratropium 2.5-0.5mg inhaled PRN, chest physio, acupella  - now on RA  -Last day of moxi on 5/20.   -Concern for aspiration, re-evaluated by speech 5/28 okay to continue dental soft diet with thin liquids.          Fibrosis of lung    CT 5/7  "traction effects in " "the inferior aspect of the right middle lobe and inferior aspect of the basal segments of the right lower lobe suggesting fibrosis and consequent scarring"  Also  There are bilateral pleural calcifications peripherally and over the diaphragm consistent with asbestos related pleural disease.  Patient needs pulmonary f/u  Patient has chest congestion and cough currently, chest physiotherapy, deep suction, acupella, and mucinex ordered        Gait disturbance    CTH 5/18 with Moderate diffuse dilation of the ventricular system, possibly normal pressure hydrocephalus.  Will need outpatient neurology f/u        Pressure injury of skin and subcutaneous tissue, stage 3    Stage 3 pressure injury with small amount of slough noted to patient's sacral spine. (0.4cm x 0.4cm x 0.2cm)   Wound Care consulted  Recommend applying triad barrier cream qshift and as needed.   Turn every 2hrs. Low airloss overlay ordered for patient. Nursing to continue care.          Moderate malnutrition    - continue boost and dental soft diet per speech          Anemia    - Hgb stable; some concern for Hgb drop as outpatient. FOBT performed and positive; no gross evidence of bleed.          Alcoholism /alcohol abuse    - History of alcohol abuse; continuing folic acid, thiamine daily.          VTE Risk Mitigation         Ordered     enoxaparin injection 40 mg  Daily      05/06/18 1016     Place sequential compression device  Until discontinued      05/06/18 0113     IP VTE LOW RISK PATIENT  Once      05/06/18 0113              Eladia Barbour MD  Department of Hospital Medicine   [facility]  "

## 2018-05-29 NOTE — NURSING
AOx4. VSS. Pt sat up in chair approx 3 hours. 2 bms noted. Call button inreach,bed low and locked. Srs upx2.

## 2018-05-30 LAB — MAGNESIUM SERPL-MCNC: 1.6 MG/DL

## 2018-05-30 PROCEDURE — 94640 AIRWAY INHALATION TREATMENT: CPT

## 2018-05-30 PROCEDURE — 94664 DEMO&/EVAL PT USE INHALER: CPT

## 2018-05-30 PROCEDURE — 97535 SELF CARE MNGMENT TRAINING: CPT

## 2018-05-30 PROCEDURE — 97530 THERAPEUTIC ACTIVITIES: CPT

## 2018-05-30 PROCEDURE — 94799 UNLISTED PULMONARY SVC/PX: CPT

## 2018-05-30 PROCEDURE — 83735 ASSAY OF MAGNESIUM: CPT

## 2018-05-30 PROCEDURE — 94761 N-INVAS EAR/PLS OXIMETRY MLT: CPT

## 2018-05-30 PROCEDURE — 99232 SBSQ HOSP IP/OBS MODERATE 35: CPT | Mod: ,,, | Performed by: HOSPITALIST

## 2018-05-30 PROCEDURE — 36415 COLL VENOUS BLD VENIPUNCTURE: CPT

## 2018-05-30 PROCEDURE — 63600175 PHARM REV CODE 636 W HCPCS: Performed by: STUDENT IN AN ORGANIZED HEALTH CARE EDUCATION/TRAINING PROGRAM

## 2018-05-30 PROCEDURE — 20600001 HC STEP DOWN PRIVATE ROOM

## 2018-05-30 PROCEDURE — 25000003 PHARM REV CODE 250: Performed by: INTERNAL MEDICINE

## 2018-05-30 PROCEDURE — 99900035 HC TECH TIME PER 15 MIN (STAT)

## 2018-05-30 PROCEDURE — 25000242 PHARM REV CODE 250 ALT 637 W/ HCPCS: Performed by: INTERNAL MEDICINE

## 2018-05-30 RX ADMIN — IPRATROPIUM BROMIDE AND ALBUTEROL SULFATE 3 ML: .5; 3 SOLUTION RESPIRATORY (INHALATION) at 07:05

## 2018-05-30 RX ADMIN — THIAMINE HCL TAB 100 MG 100 MG: 100 TAB at 09:05

## 2018-05-30 RX ADMIN — IPRATROPIUM BROMIDE AND ALBUTEROL SULFATE 3 ML: .5; 3 SOLUTION RESPIRATORY (INHALATION) at 01:05

## 2018-05-30 RX ADMIN — GUAIFENESIN 600 MG: 600 TABLET, EXTENDED RELEASE ORAL at 09:05

## 2018-05-30 RX ADMIN — GUAIFENESIN 600 MG: 600 TABLET, EXTENDED RELEASE ORAL at 10:05

## 2018-05-30 RX ADMIN — ENOXAPARIN SODIUM 40 MG: 100 INJECTION SUBCUTANEOUS at 05:05

## 2018-05-30 RX ADMIN — FLUTICASONE PROPIONATE 100 MCG: 50 SPRAY, METERED NASAL at 09:05

## 2018-05-30 RX ADMIN — FOLIC ACID 1 MG: 1 TABLET ORAL at 09:05

## 2018-05-30 RX ADMIN — CETIRIZINE HYDROCHLORIDE 5 MG: 5 TABLET, FILM COATED ORAL at 09:05

## 2018-05-30 RX ADMIN — IPRATROPIUM BROMIDE AND ALBUTEROL SULFATE 3 ML: .5; 3 SOLUTION RESPIRATORY (INHALATION) at 08:05

## 2018-05-30 NOTE — PT/OT/SLP PROGRESS
Occupational Therapy   Treatment    Name: Ryder Boo  MRN: [id number]  Admitting Diagnosis:  Aspiration pneumonia       Recommendations:     Discharge Recommendations: rehabilitation facility  Discharge Equipment Recommendations:  walker, rolling, bedside commode, shower chair  Barriers to discharge:       Subjective     Communicated with: RN prior to session.  Pain/Comfort:  · Pain Rating 1: 0/10    Patients cultural, spiritual, Cheondoism conflicts given the current situation: none stated    Objective:     Patient found with: hess catheter, oxygen    General Precautions: Standard, fall   Orthopedic Precautions:N/A   Braces:       Occupational Performance:    Bed Mobility:    · Patient completed Rolling/Turning to Left with  stand by assistance  · Patient completed Scooting/Bridging with stand by assistance  · Patient completed Supine to Sit with stand by assistance     Functional Mobility/Transfers:  · Patient completed Sit <> Stand Transfer with minimum assistance  with  rolling walker   · Patient completed Bed <> Chair Transfer using Step Transfer technique with contact guard assistance with rolling walker  · Patient completed Toilet Transfer Step Transfer technique with contact guard assistance with  bedside commode and rolling walker  · Functional Mobility: Pt walked ~ 10' within room with CGA using a RW.    Activities of Daily Living:  · UB Dressing: stand by assistance  · LB Dressing: stand by assistance able to adjust socks in long sit.  · Toileting: total assistance for hygiene in standing.    Patient left up in chair with all lines intact and call button in reach    AMPAC 6 Click:  AMPAC Total Score: 20    Treatment & Education:  Pt provided verbal/physical education on proper RW usage as well as proper/safe transferring techniques. Pt able to provide return-demonstration.  Encouraged to sit up in chair and explained the benefits of being OOB.  Education:    Assessment:     Ryder Boo is a 63 y.o.  male with a medical diagnosis of Aspiration pneumonia.  He presents with improving functional ability but still requires ongoing OT to maximize functional potential and to increase his endurance, strength and overall safety. Performance deficits affecting function are weakness, gait instability, impaired balance, impaired endurance, decreased safety awareness, impaired self care skills, impaired functional mobilty, decreased coordination.      Rehab Prognosis:  Good; patient would benefit from acute skilled OT services to address these deficits and reach maximum level of function.       Plan:     Patient to be seen 4 x/week to address the above listed problems via self-care/home management, therapeutic activities, therapeutic exercises  · Plan of Care Expires: 06/07/18  · Plan of Care Reviewed with: patient    This Plan of care has been discussed with the patient who was involved in its development and understands and is in agreement with the identified goals and treatment plan    GOALS:    Occupational Therapy Goals        Problem: Occupational Therapy Goal    Goal Priority Disciplines Outcome Interventions   Occupational Therapy Goal     OT, PT/OT Ongoing (interventions implemented as appropriate)    Description:  Goals to be met by: 5/22/18     Patient will increase functional independence with ADLs by performing:    Feeding with Supervision.  UE Dressing with Minimal Assistance. MET 5/16  LE Dressing with Moderate Assistance. MET 5/16  Grooming while standing with Contact Guard Assistance.  Toileting from bedside commode with Moderate Assistance for hygiene and clothing management.   Toilet transfer to bedside commode with Minimal Assistance .                        Time Tracking:     OT Date of Treatment: 05/30/18  OT Start Time: 1231  OT Stop Time: 1309  OT Total Time (min): 38 min    Billable Minutes:Self Care/Home Management 12  Therapeutic Activity 26    FRANCA Tavarez  5/30/2018

## 2018-05-30 NOTE — PROGRESS NOTES
[facility] Hospital Medicine  Progress Note    Patient Name: Ryder Boo  MRN: [id number]  Patient Class: IP- Inpatient   Admission Date: 5/6/2018  Length of Stay: 24 days  Attending Physician: Shaylee Chao MD  Primary Care Provider: Swapnil Heart MD    [facility] Medicine Team: [facility] HOSP MED 4 Eladia Barbour MD    Subjective:     Principal Problem:Aspiration pneumonia    HPI:  The patient is a 62 y/o M new to our system, with HTN, COPD and alcoholism, who is transfer from [facility] ICU to [facility] on mechanical ventilation.     The patient lives with his brother across the street from his sister. He was found unresponsive in a chair with small amount of blood in his mouth by his nephew and taken to the hospital by EMS on 5/3. Patient drinks every day and has been having frequent falls recently. Per sister he has quit smoking 15 years ago.  In the ED at OSH he was found to be hypoxic and hypotensive, with undetectable EtOH levels, leukocytosis (24K) and hyponatremia (119) in the labs. he received narcan x1, IV fluids, started on metronidazole and ceftriaxone (also x1 of zosyn, azithromycin, vancomycin, ceftaroline) for aspiration pneumonia, and was put on 15L of Oxygen with non-rebreather, which improved his mental status and BP, however on 4/5 upon weaning off of oxygen he developed hypoxic hypercapnic respiratory failure and subsequent acidosis (pH 7.10), therefore he was intubated and transferred to the ICU. Patient arrived to [facility] on dopamine gtt which per notes was started to maintain BP in the setting of hyponatremia.     Hospital Course:  5/6: upon arrival, patient was taken off of dopamine gtt and received 1.5L IV fluids to maintain MAPs. Continued on vanc/zosyn/azithromycin. Hyponatremia and ROHITH continued to improved with IVF. He was extubated in early afternoon and maintained normal O2 saturations on 3-4L oxygen on NC.   5/7: early morning he was noticed to be rattling while  breathing by the nurse. Upon examination he had increased work of breathing with worsening of bilateral rhonchi. Repeat CXR revealed interval worsening of the patchy infiltrations. Received x1 furosemide 60 mg IV and transitioned to high flow oxygen for possible development of ARDS, he is currently maintaining normal saturations, will keep on intubation watch.  5/8: several episodes of desaturation down to 70% overnight and early morning, that responded well to increasing oxygen to 100%, currently on 35L/50%, mildly drowsy due to precedex. Responded well to diuretics, net -2.8L. Will discontinue precedex and will have speech evaluate his swallowing.  5/9: Uneventful night off precedex. Remains confused, oriented to person and place. Oxygen requirements greatly improved 15L/50% with plan to wean to low-flow NC today. Diuresed -2.9L yesterday and discontinued lasix. Discontinued azithromycin and continued on zosyn. Failed speech eval by SLP and remains NPO  5/11-5/12 patient had NG tube placed, removed it and re-ordered on gentle tube feeds. On high o2 support at night as patient cannot have bipap due to NG.  05/14/2018 -Patient passed swallow eval to full liquids. He is now sating 100 % RA family updated awaiting rehab evaluation   05/17/2018 Patient was desating in the morning to 85-88 but he was alert oriented and no complaints gave him a breathing treatment   05/18/2018 awaiting placement  05/19/2018 CT, requested by rehab to r/o stroke, shows mild signs of possible NPH. Will order f/u o/p neuro. Patient reports x3 BMs, will see if continues. Will complete 1 more day of moxi.   05/20/2018 no acute events overnight patient on last day of moxi  Subsequently  Patient has been awaiting placement, added chest physiotherapy, deep suction, and acupella for chest congestion; also for upper airway congestion, added flonase and mucinex; procal negative for pna, cxr 5/28 with some superimpose edema, speech re-eval for  aspiration, okay for dental soft diet. Given one dose of 20mg IV lasix    Interval History: Patient cough and congestion improved.    Review of Systems   Constitutional: Negative for activity change, chills, fatigue and fever.   HENT: Positive for congestion. Negative for drooling, hearing loss, trouble swallowing and voice change.    Eyes: Negative for pain and visual disturbance.   Respiratory: Positive for cough. Negative for shortness of breath and wheezing.    Cardiovascular: Negative for chest pain and palpitations.   Gastrointestinal: Negative for abdominal pain, nausea and vomiting.   Genitourinary: Negative for difficulty urinating and flank pain.   Musculoskeletal: Positive for gait problem. Negative for arthralgias, back pain, myalgias and neck pain.   Skin: Negative for rash and wound.   Neurological: Negative for dizziness, weakness, numbness and headaches.   Psychiatric/Behavioral: Negative for agitation, confusion and hallucinations. The patient is not nervous/anxious.      Objective:     Vital Signs (Most Recent):  Temp: 98.6 °F (37 °C) (05/30/18 1300)  Pulse: 72 (05/30/18 1334)  Resp: 18 (05/30/18 1334)  BP: 97/67 (05/30/18 1300)  SpO2: 96 % (05/30/18 1334) Vital Signs (24h Range):  Temp:  [98.2 °F (36.8 °C)-98.8 °F (37.1 °C)] 98.6 °F (37 °C)  Pulse:  [72-99] 72  Resp:  [18] 18  SpO2:  [93 %-99 %] 96 %  BP: ()/(67-76) 97/67     Weight: 73 kg (160 lb 15 oz)  Body mass index is 25.98 kg/m².    Intake/Output Summary (Last 24 hours) at 05/30/18 1428  Last data filed at 05/30/18 0412   Gross per 24 hour   Intake             1000 ml   Output              275 ml   Net              725 ml      Physical Exam   Constitutional: He appears well-developed and well-nourished. No distress.   HENT:   Head: Normocephalic and atraumatic.   Right Ear: External ear normal.   Left Ear: External ear normal.   Nose: Nose normal.   Eyes: Right eye exhibits no discharge. Left eye exhibits no discharge. No scleral  "icterus.   Neck: Normal range of motion.   Cardiovascular: Normal rate, regular rhythm and intact distal pulses.    Pulmonary/Chest: Effort normal. No respiratory distress. He has no wheezes.   Insp crackles in RLL and LML and LLL   Abdominal: Soft. He exhibits no distension. There is no tenderness.   Musculoskeletal: Normal range of motion. He exhibits no edema or tenderness.   Neurological: He is alert. He exhibits normal muscle tone.   Skin: Skin is warm and dry. No rash noted.   Psychiatric: He has a normal mood and affect. His behavior is normal. His speech is not slurred. Cognition and memory are not impaired.   Vitals reviewed.      Significant Labs:   CBC:   No results for input(s): WBC, HGB, HCT, PLT in the last 48 hours.  CMP:     Recent Labs  Lab 05/29/18  0630      K 3.7      CO2 27   GLU 85   BUN 16   CREATININE 0.8   CALCIUM 9.0   ANIONGAP 8   EGFRNONAA >60.0     All pertinent labs within the past 24 hours have been reviewed.    Significant Imaging: I have reviewed and interpreted all pertinent imaging results/findings within the past 24 hours.    Assessment/Plan:      * Aspiration pneumonia    - At OSH had significant leukocytosis with WBCs to 38K.  - CXR with bilateral multifocal infiltration.  - Suspect aspiration secondary to AMS.  -Completed course of Moxi. Last day was 5/20.         Acute respiratory failure with hypoxia and hypercapnia    - Former tobacco abuse with history of COPD; concern for volume overload in ICU setting as well and underwent some diuresis. Respiratory status improved now; on 05/10 did have significant respiratory demand prompting CTA; negative for thromboembolic process.  - Continuing albuterol-ipratropium 2.5-0.5mg inhaled PRN, chest physio, acupella  - now on RA  -Last day of moxi on 5/20.   -Concern for aspiration, re-evaluated by speech 5/28 okay to continue dental soft diet with thin liquids.          Fibrosis of lung    CT 5/7  "traction effects in the " "inferior aspect of the right middle lobe and inferior aspect of the basal segments of the right lower lobe suggesting fibrosis and consequent scarring"  Also  There are bilateral pleural calcifications peripherally and over the diaphragm consistent with asbestos related pleural disease.  Patient needs pulmonary f/u  Patient has chest congestion and cough currently, chest physiotherapy, deep suction, acupella, and mucinex ordered        Gait disturbance    CTH 5/18 with Moderate diffuse dilation of the ventricular system, possibly normal pressure hydrocephalus.  Will need outpatient neurology f/u        Pressure injury of skin and subcutaneous tissue, stage 3    Stage 3 pressure injury with small amount of slough noted to patient's sacral spine. (0.4cm x 0.4cm x 0.2cm)   Wound Care consulted  Recommend applying triad barrier cream qshift and as needed.   Turn every 2hrs. Low airloss overlay ordered for patient. Nursing to continue care.          Moderate malnutrition    - continue boost and dental soft diet per speech          Anemia    - Hgb stable; some concern for Hgb drop as outpatient. FOBT performed and positive; no gross evidence of bleed.          Alcoholism /alcohol abuse    - History of alcohol abuse; continuing folic acid, thiamine daily.          VTE Risk Mitigation         Ordered     enoxaparin injection 40 mg  Daily      05/06/18 1016     Place sequential compression device  Until discontinued      05/06/18 0113     IP VTE LOW RISK PATIENT  Once      05/06/18 0113              Eladia Barbour MD  Department of Hospital Medicine   [facility]  "

## 2018-05-30 NOTE — PLAN OF CARE
2:34 PM  SW spoke w/rep Aaliyah at [facility] and she reported [facility] just phoned her today after lunch approx 1:30'rayne.  Stated they requested the clinicals from her and she is waiting to hear back from them.    EDUAR following for DC needs.  EDUAR in communication with GIOVANNI Murray, MSW  [facility]

## 2018-05-30 NOTE — PLAN OF CARE
Problem: Patient Care Overview  Goal: Plan of Care Review  Dx: Pneumonia   Hx: HTN, COPD and alcoholism, who is a transfer from [facility] ICU to [facility] on mechanical ventilation after being found unresponsive at home    4/5: Transferred to [facility] on mechanical ventilation. Dopamine gtt off.   4/6: Pt extubated  5/7: Echo 50-55%         RN  MAP>65    *Pt likes [employer] channel 3        Outcome: Ongoing (interventions implemented as appropriate)  VSS. No complaints of pain.  A/Ox4.  No acute events over night. Safety maintained.  All questions answered. Patient updated on plan of care.  Will continue to monitor.

## 2018-05-30 NOTE — SUBJECTIVE & OBJECTIVE
Interval History: Patient cough and congestion improved.    Review of Systems   Constitutional: Negative for activity change, chills, fatigue and fever.   HENT: Positive for congestion. Negative for drooling, hearing loss, trouble swallowing and voice change.    Eyes: Negative for pain and visual disturbance.   Respiratory: Positive for cough. Negative for shortness of breath and wheezing.    Cardiovascular: Negative for chest pain and palpitations.   Gastrointestinal: Negative for abdominal pain, nausea and vomiting.   Genitourinary: Negative for difficulty urinating and flank pain.   Musculoskeletal: Positive for gait problem. Negative for arthralgias, back pain, myalgias and neck pain.   Skin: Negative for rash and wound.   Neurological: Negative for dizziness, weakness, numbness and headaches.   Psychiatric/Behavioral: Negative for agitation, confusion and hallucinations. The patient is not nervous/anxious.      Objective:     Vital Signs (Most Recent):  Temp: 98.6 °F (37 °C) (05/30/18 1300)  Pulse: 72 (05/30/18 1334)  Resp: 18 (05/30/18 1334)  BP: 97/67 (05/30/18 1300)  SpO2: 96 % (05/30/18 1334) Vital Signs (24h Range):  Temp:  [98.2 °F (36.8 °C)-98.8 °F (37.1 °C)] 98.6 °F (37 °C)  Pulse:  [72-99] 72  Resp:  [18] 18  SpO2:  [93 %-99 %] 96 %  BP: ()/(67-76) 97/67     Weight: 73 kg (160 lb 15 oz)  Body mass index is 25.98 kg/m².    Intake/Output Summary (Last 24 hours) at 05/30/18 1428  Last data filed at 05/30/18 0412   Gross per 24 hour   Intake             1000 ml   Output              275 ml   Net              725 ml      Physical Exam   Constitutional: He appears well-developed and well-nourished. No distress.   HENT:   Head: Normocephalic and atraumatic.   Right Ear: External ear normal.   Left Ear: External ear normal.   Nose: Nose normal.   Eyes: Right eye exhibits no discharge. Left eye exhibits no discharge. No scleral icterus.   Neck: Normal range of motion.   Cardiovascular: Normal rate,  regular rhythm and intact distal pulses.    Pulmonary/Chest: Effort normal. No respiratory distress. He has no wheezes.   Insp crackles in RLL and LML and LLL   Abdominal: Soft. He exhibits no distension. There is no tenderness.   Musculoskeletal: Normal range of motion. He exhibits no edema or tenderness.   Neurological: He is alert. He exhibits normal muscle tone.   Skin: Skin is warm and dry. No rash noted.   Psychiatric: He has a normal mood and affect. His behavior is normal. His speech is not slurred. Cognition and memory are not impaired.   Vitals reviewed.      Significant Labs:   CBC:   No results for input(s): WBC, HGB, HCT, PLT in the last 48 hours.  CMP:     Recent Labs  Lab 05/29/18  0630      K 3.7      CO2 27   GLU 85   BUN 16   CREATININE 0.8   CALCIUM 9.0   ANIONGAP 8   EGFRNONAA >60.0     All pertinent labs within the past 24 hours have been reviewed.    Significant Imaging: I have reviewed and interpreted all pertinent imaging results/findings within the past 24 hours.

## 2018-05-31 PROBLEM — Z74.09 IMPAIRED MOBILITY AND ADLS: Status: RESOLVED | Noted: 2018-05-31 | Resolved: 2018-05-31

## 2018-05-31 PROBLEM — Z78.9 IMPAIRED MOBILITY AND ADLS: Status: RESOLVED | Noted: 2018-05-31 | Resolved: 2018-05-31

## 2018-05-31 PROBLEM — Z74.09 IMPAIRED MOBILITY AND ADLS: Status: RESOLVED | Noted: 2018-05-14 | Resolved: 2018-05-31

## 2018-05-31 PROBLEM — Z78.9 IMPAIRED MOBILITY AND ADLS: Status: RESOLVED | Noted: 2018-05-14 | Resolved: 2018-05-31

## 2018-05-31 LAB
ANION GAP SERPL CALC-SCNC: 8 MMOL/L
BUN SERPL-MCNC: 18 MG/DL
CALCIUM SERPL-MCNC: 9.3 MG/DL
CHLORIDE SERPL-SCNC: 104 MMOL/L
CO2 SERPL-SCNC: 27 MMOL/L
CREAT SERPL-MCNC: 0.8 MG/DL
EST. GFR  (AFRICAN AMERICAN): >60 ML/MIN/1.73 M^2
EST. GFR  (NON AFRICAN AMERICAN): >60 ML/MIN/1.73 M^2
GLUCOSE SERPL-MCNC: 80 MG/DL
MAGNESIUM SERPL-MCNC: 1.7 MG/DL
POTASSIUM SERPL-SCNC: 3.7 MMOL/L
SODIUM SERPL-SCNC: 139 MMOL/L

## 2018-05-31 PROCEDURE — 99232 SBSQ HOSP IP/OBS MODERATE 35: CPT | Mod: ,,, | Performed by: HOSPITALIST

## 2018-05-31 PROCEDURE — 94664 DEMO&/EVAL PT USE INHALER: CPT

## 2018-05-31 PROCEDURE — 99900035 HC TECH TIME PER 15 MIN (STAT)

## 2018-05-31 PROCEDURE — 25000003 PHARM REV CODE 250: Performed by: INTERNAL MEDICINE

## 2018-05-31 PROCEDURE — 20600001 HC STEP DOWN PRIVATE ROOM

## 2018-05-31 PROCEDURE — 97110 THERAPEUTIC EXERCISES: CPT

## 2018-05-31 PROCEDURE — 94761 N-INVAS EAR/PLS OXIMETRY MLT: CPT

## 2018-05-31 PROCEDURE — 83735 ASSAY OF MAGNESIUM: CPT

## 2018-05-31 PROCEDURE — 27000646 HC AEROBIKA DEVICE

## 2018-05-31 PROCEDURE — 80048 BASIC METABOLIC PNL TOTAL CA: CPT

## 2018-05-31 PROCEDURE — 94668 MNPJ CHEST WALL SBSQ: CPT

## 2018-05-31 PROCEDURE — 25000242 PHARM REV CODE 250 ALT 637 W/ HCPCS: Performed by: INTERNAL MEDICINE

## 2018-05-31 PROCEDURE — 94640 AIRWAY INHALATION TREATMENT: CPT

## 2018-05-31 PROCEDURE — 97535 SELF CARE MNGMENT TRAINING: CPT

## 2018-05-31 PROCEDURE — 97530 THERAPEUTIC ACTIVITIES: CPT

## 2018-05-31 PROCEDURE — 97116 GAIT TRAINING THERAPY: CPT

## 2018-05-31 PROCEDURE — 63600175 PHARM REV CODE 636 W HCPCS: Performed by: STUDENT IN AN ORGANIZED HEALTH CARE EDUCATION/TRAINING PROGRAM

## 2018-05-31 RX ORDER — IPRATROPIUM BROMIDE AND ALBUTEROL SULFATE 2.5; .5 MG/3ML; MG/3ML
3 SOLUTION RESPIRATORY (INHALATION) EVERY 6 HOURS
Qty: 1 BOX | Refills: 0 | Status: SHIPPED | OUTPATIENT
Start: 2018-05-31 | End: 2019-05-31

## 2018-05-31 RX ADMIN — IPRATROPIUM BROMIDE AND ALBUTEROL SULFATE 3 ML: .5; 3 SOLUTION RESPIRATORY (INHALATION) at 07:05

## 2018-05-31 RX ADMIN — IPRATROPIUM BROMIDE AND ALBUTEROL SULFATE 3 ML: .5; 3 SOLUTION RESPIRATORY (INHALATION) at 12:05

## 2018-05-31 RX ADMIN — GUAIFENESIN 600 MG: 600 TABLET, EXTENDED RELEASE ORAL at 08:05

## 2018-05-31 RX ADMIN — FLUTICASONE PROPIONATE 100 MCG: 50 SPRAY, METERED NASAL at 08:05

## 2018-05-31 RX ADMIN — GUAIFENESIN 600 MG: 600 TABLET, EXTENDED RELEASE ORAL at 09:05

## 2018-05-31 RX ADMIN — THIAMINE HCL TAB 100 MG 100 MG: 100 TAB at 08:05

## 2018-05-31 RX ADMIN — FOLIC ACID 1 MG: 1 TABLET ORAL at 08:05

## 2018-05-31 RX ADMIN — ENOXAPARIN SODIUM 40 MG: 100 INJECTION SUBCUTANEOUS at 05:05

## 2018-05-31 RX ADMIN — CETIRIZINE HYDROCHLORIDE 5 MG: 5 TABLET, FILM COATED ORAL at 08:05

## 2018-05-31 NOTE — PROGRESS NOTES
"  [facility]  Adult Nutrition  Consult Note    SUMMARY     Recommendations    1. Continue current mechanical soft diet + Boost Plus ONS.   2. RD to monitor & follow-up.    Goals: Meet % EEN, EPN  Nutrition Goal Status: goal met  Communication of RD Recs: reviewed with RN    Reason for Assessment    Reason for Assessment: RD follow-up  Diagnosis:  ( Aspiration pneumonia )  Relevant Medical History: EtOH abuse, COPD    General Information Comments: Pt continues w/ good appetite, consuming 100% of meals. ST recommends dental soft diet w/ thin liquids.  Nutrition Discharge Planning: Adequate PO intake    Nutrition/Diet History    Patient Reported Diet/Restrictions/Preferences: general  Food Preferences: none  Do you have any cultural, spiritual, Restorationism conflicts, given your current situation?: None stated  Food Allergies: NKFA  Factors Affecting Nutritional Intake: None identified at this time    Anthropometrics    Temp: 98 °F (36.7 °C)  Height Method: Estimated  Height: 5' 6" (167.6 cm)  Height (inches): 66 in  Weight Method: Bed Scale  Weight: 73 kg (160 lb 15 oz)  Weight (lb): 160.94 lb  Ideal Body Weight (IBW), Male: 142 lb  % Ideal Body Weight, Male (lb): 113.34 lb  BMI (Calculated): 26  BMI Grade: 25 - 29.9 - overweight  Weight Loss: unintentional  Usual Body Weight (UBW), k.1 kg  Weight Change Amount: 19 lb 9.9 oz  % Usual Body Weight: 89.73  % Weight Change From Usual Weight: -10.46 %    Lab/Procedures/Meds    Pertinent Labs Reviewed: reviewed  Pertinent Labs Comments: Stable  Pertinent Medications Reviewed: reviewed  Pertinent Medications Comments: -    Physical Findings/Assessment    Overall Physical Appearance: nourished  Oral/Mouth Cavity: WDL  Skin: pressure ulcer(s) (Left Buttocks)    Estimated/Assessed Needs    Weight Used For Calorie Calculations: 73 kg (160 lb 15 oz)     Energy Calorie Requirements (kcal): 1841 kcal/d  Energy Need Method: Luis E Valdez (1.25 PAL) "     Protein Requirements: 73 g/d (1 g/kg)  Weight Used For Protein Calculations: 73 kg (160 lb 15 oz)     Fluid Requirements (mL): 1 mL/kcal or per MD     Nutrition Prescription Ordered    Current Diet Order: Mechanical soft w/ thin liquids + Boost Plus ONS  Current Nutrition Support Formula Ordered: Other (Comment) (Discontinued)    Nutrition Risk    Level of Risk/Frequency of Follow-up: moderate     Assessment and Plan    Moderate malnutrition     Malnutrition in the context of Acute Illness/Injury     Related to (etiology):  No nutrient intake for the last week     Signs and Symptoms (as evidenced by):  Energy Intake: 0 of estimated energy requirement for 1+ wk  Body Fat Depletion: mild depletion of orbital's, triceps and thoracic and lumbar region   Muscle Mass Depletion: mild depletion of temples, clavicle region, interosseous muscle and lower extremities   Weight Loss: 10.5% x 2 wks      Nutrition Diagnosis Status:  Improving      Monitor and Evaluation    Food and Nutrient Intake: energy intake, food and beverage intake  Food and Nutrient Adminstration: diet order  Physical Activity and Function: nutrition-related ADLs and IADLs  Anthropometric Measurements: weight, weight change  Biochemical Data, Medical Tests and Procedures: lipid profile, inflammatory profile, glucose/endocrine profile, gastrointestinal profile, electrolyte and renal panel  Nutrition-Focused Physical Findings: overall appearance     Nutrition Follow-Up    RD Follow-up?: Yes

## 2018-05-31 NOTE — SUBJECTIVE & OBJECTIVE
Interval History: Patient feeling well this morning with no complaints.     Review of Systems   Constitutional: Negative for activity change, chills, fatigue and fever.   HENT: Positive for congestion. Negative for drooling, hearing loss, trouble swallowing and voice change.    Eyes: Negative for pain and visual disturbance.   Respiratory: Positive for cough. Negative for shortness of breath and wheezing.    Cardiovascular: Negative for chest pain and palpitations.   Gastrointestinal: Negative for abdominal pain, nausea and vomiting.   Genitourinary: Negative for difficulty urinating and flank pain.   Musculoskeletal: Positive for gait problem. Negative for arthralgias, back pain, myalgias and neck pain.   Skin: Negative for rash and wound.   Neurological: Negative for dizziness, weakness, numbness and headaches.   Psychiatric/Behavioral: Negative for agitation, confusion and hallucinations. The patient is not nervous/anxious.      Objective:     Vital Signs (Most Recent):  Temp: 98 °F (36.7 °C) (05/31/18 1113)  Pulse: 89 (05/31/18 1225)  Resp: (!) 23 (05/31/18 1225)  BP: 124/73 (05/31/18 1113)  SpO2: 96 % (05/31/18 1225) Vital Signs (24h Range):  Temp:  [97.2 °F (36.2 °C)-98.6 °F (37 °C)] 98 °F (36.7 °C)  Pulse:  [] 89  Resp:  [16-23] 23  SpO2:  [90 %-97 %] 96 %  BP: (118-148)/(70-84) 124/73     Weight: 73 kg (160 lb 15 oz)  Body mass index is 25.98 kg/m².    Intake/Output Summary (Last 24 hours) at 05/31/18 1438  Last data filed at 05/31/18 0826   Gross per 24 hour   Intake              500 ml   Output              250 ml   Net              250 ml      Physical Exam   Constitutional: He appears well-developed and well-nourished. No distress.   HENT:   Head: Normocephalic and atraumatic.   Right Ear: External ear normal.   Left Ear: External ear normal.   Nose: Nose normal.   Eyes: Right eye exhibits no discharge. Left eye exhibits no discharge. No scleral icterus.   Neck: Normal range of motion.    Cardiovascular: Normal rate, regular rhythm and intact distal pulses.    Pulmonary/Chest: Effort normal. No respiratory distress. He has no wheezes.   Insp crackles in RLL and LML and LLL   Abdominal: Soft. He exhibits no distension. There is no tenderness.   Musculoskeletal: Normal range of motion. He exhibits no edema or tenderness.   Neurological: He is alert. He exhibits normal muscle tone.   Skin: Skin is warm and dry. No rash noted.   Psychiatric: He has a normal mood and affect. His behavior is normal. His speech is not slurred. Cognition and memory are not impaired.   Vitals reviewed.      Significant Labs:   CBC:   No results for input(s): WBC, HGB, HCT, PLT in the last 48 hours.  CMP:     Recent Labs  Lab 05/31/18  0439      K 3.7      CO2 27   GLU 80   BUN 18   CREATININE 0.8   CALCIUM 9.3   ANIONGAP 8   EGFRNONAA >60.0     All pertinent labs within the past 24 hours have been reviewed.    Significant Imaging: I have reviewed and interpreted all pertinent imaging results/findings within the past 24 hours.

## 2018-05-31 NOTE — PLAN OF CARE
Problem: Physical Therapy Goal  Goal: Physical Therapy Goal  Goals to be met by: 2018    Patient will increase functional independence with mobility by performin. Revised: Supine to sit with CGA- Met 2018  2. Revised: Sit to supine with CGA- MET  3. Revised: Sit-stand transfer with CGA using LRAD  4.Revised: Bed-chair transfer with Min Assist using LRAD- MET  5. Gait  x 20 feet with Moderate Assistance using LRAD or no AD -  met  6. Lower extremity exercise program x20 reps per handout, with supervision - met           Outcome: Ongoing (interventions implemented as appropriate)  Patient continues to progress well towards his goals as evidence of goals achieved.

## 2018-05-31 NOTE — PROGRESS NOTES
[facility] Hospital Medicine  Progress Note    Patient Name: Ryder Boo  MRN: [id number]  Patient Class: IP- Inpatient   Admission Date: 5/6/2018  Length of Stay: 25 days  Attending Physician: Shaylee Chao MD  Primary Care Provider: Swapnil Heart MD    [facility] Medicine Team: [facility] [place] [place] HOSP MED 4 SILVIA Bañuelos    Subjective:     Principal Problem:Aspiration pneumonia    HPI:  The patient is a 62 y/o M new to our system, with HTN, COPD and alcoholism, who is transfer from [facility] ICU to [facility] [place] [place] on mechanical ventilation.     The patient lives with his brother across the street from his sister. He was found unresponsive in a chair with small amount of blood in his mouth by his nephew and taken to the hospital by EMS on 5/3. Patient drinks every day and has been having frequent falls recently. Per sister he has quit smoking 15 years ago.  In the ED at OSH he was found to be hypoxic and hypotensive, with undetectable EtOH levels, leukocytosis (24K) and hyponatremia (119) in the labs. he received narcan x1, IV fluids, started on metronidazole and ceftriaxone (also x1 of zosyn, azithromycin, vancomycin, ceftaroline) for aspiration pneumonia, and was put on 15L of Oxygen with non-rebreather, which improved his mental status and BP, however on 4/5 upon weaning off of oxygen he developed hypoxic hypercapnic respiratory failure and subsequent acidosis (pH 7.10), therefore he was intubated and transferred to the ICU. Patient arrived to [facility] [place] [place] on dopamine gtt which per notes was started to maintain BP in the setting of hyponatremia.     Hospital Course:  5/6: upon arrival, patient was taken off of dopamine gtt and received 1.5L IV fluids to maintain MAPs. Continued on vanc/zosyn/azithromycin. Hyponatremia and ROHITH continued to improved with IVF. He was extubated in early afternoon and maintained normal O2 saturations on 3-4L oxygen on NC.   5/7: early morning he was noticed to be rattling  while breathing by the nurse. Upon examination he had increased work of breathing with worsening of bilateral rhonchi. Repeat CXR revealed interval worsening of the patchy infiltrations. Received x1 furosemide 60 mg IV and transitioned to high flow oxygen for possible development of ARDS, he is currently maintaining normal saturations, will keep on intubation watch.  5/8: several episodes of desaturation down to 70% overnight and early morning, that responded well to increasing oxygen to 100%, currently on 35L/50%, mildly drowsy due to precedex. Responded well to diuretics, net -2.8L. Will discontinue precedex and will have speech evaluate his swallowing.  5/9: Uneventful night off precedex. Remains confused, oriented to person and place. Oxygen requirements greatly improved 15L/50% with plan to wean to low-flow NC today. Diuresed -2.9L yesterday and discontinued lasix. Discontinued azithromycin and continued on zosyn. Failed speech eval by SLP and remains NPO  5/11-5/12 patient had NG tube placed, removed it and re-ordered on gentle tube feeds. On high o2 support at night as patient cannot have bipap due to NG.  05/14/2018 -Patient passed swallow eval to full liquids. He is now sating 100 % RA family updated awaiting rehab evaluation   05/17/2018 Patient was desating in the morning to 85-88 but he was alert oriented and no complaints gave him a breathing treatment   05/18/2018 awaiting placement  05/19/2018 CT, requested by rehab to r/o stroke, shows mild signs of possible NPH. Will order f/u o/p neuro. Patient reports x3 BMs, will see if continues. Will complete 1 more day of moxi.   05/20/2018 no acute events overnight patient on last day of moxi  Subsequently  Patient has been awaiting placement, added chest physiotherapy, deep suction, and acupella for chest congestion; also for upper airway congestion, added flonase and mucinex; procal negative for pna, cxr 5/28 with some superimpose edema, speech re-eval for  aspiration, okay for dental soft diet. Given one dose of 20mg IV lasix    Interval History: Patient feeling well this morning with no complaints.     Review of Systems   Constitutional: Negative for activity change, chills, fatigue and fever.   HENT: Positive for congestion. Negative for drooling, hearing loss, trouble swallowing and voice change.    Eyes: Negative for pain and visual disturbance.   Respiratory: Positive for cough. Negative for shortness of breath and wheezing.    Cardiovascular: Negative for chest pain and palpitations.   Gastrointestinal: Negative for abdominal pain, nausea and vomiting.   Genitourinary: Negative for difficulty urinating and flank pain.   Musculoskeletal: Positive for gait problem. Negative for arthralgias, back pain, myalgias and neck pain.   Skin: Negative for rash and wound.   Neurological: Negative for dizziness, weakness, numbness and headaches.   Psychiatric/Behavioral: Negative for agitation, confusion and hallucinations. The patient is not nervous/anxious.      Objective:     Vital Signs (Most Recent):  Temp: 98 °F (36.7 °C) (05/31/18 1113)  Pulse: 89 (05/31/18 1225)  Resp: (!) 23 (05/31/18 1225)  BP: 124/73 (05/31/18 1113)  SpO2: 96 % (05/31/18 1225) Vital Signs (24h Range):  Temp:  [97.2 °F (36.2 °C)-98.6 °F (37 °C)] 98 °F (36.7 °C)  Pulse:  [] 89  Resp:  [16-23] 23  SpO2:  [90 %-97 %] 96 %  BP: (118-148)/(70-84) 124/73     Weight: 73 kg (160 lb 15 oz)  Body mass index is 25.98 kg/m².    Intake/Output Summary (Last 24 hours) at 05/31/18 1438  Last data filed at 05/31/18 0826   Gross per 24 hour   Intake              500 ml   Output              250 ml   Net              250 ml      Physical Exam   Constitutional: He appears well-developed and well-nourished. No distress.   HENT:   Head: Normocephalic and atraumatic.   Right Ear: External ear normal.   Left Ear: External ear normal.   Nose: Nose normal.   Eyes: Right eye exhibits no discharge. Left eye exhibits no  discharge. No scleral icterus.   Neck: Normal range of motion.   Cardiovascular: Normal rate, regular rhythm and intact distal pulses.    Pulmonary/Chest: Effort normal. No respiratory distress. He has no wheezes.   Insp crackles in RLL and LML and LLL   Abdominal: Soft. He exhibits no distension. There is no tenderness.   Musculoskeletal: Normal range of motion. He exhibits no edema or tenderness.   Neurological: He is alert. He exhibits normal muscle tone.   Skin: Skin is warm and dry. No rash noted.   Psychiatric: He has a normal mood and affect. His behavior is normal. His speech is not slurred. Cognition and memory are not impaired.   Vitals reviewed.      Significant Labs:   CBC:   No results for input(s): WBC, HGB, HCT, PLT in the last 48 hours.  CMP:     Recent Labs  Lab 05/31/18  0439      K 3.7      CO2 27   GLU 80   BUN 18   CREATININE 0.8   CALCIUM 9.3   ANIONGAP 8   EGFRNONAA >60.0     All pertinent labs within the past 24 hours have been reviewed.    Significant Imaging: I have reviewed and interpreted all pertinent imaging results/findings within the past 24 hours.    Assessment/Plan:      * Aspiration pneumonia    - At OSH had significant leukocytosis with WBCs to 38K.  - CXR with bilateral multifocal infiltration.  - Suspect aspiration secondary to AMS.  -Completed course of Moxi. Last day was 5/20.         Gait disturbance    CTH 5/18 with Moderate diffuse dilation of the ventricular system, possibly normal pressure hydrocephalus.  Will need outpatient neurology f/u        Pressure injury of skin and subcutaneous tissue, stage 3    Stage 3 pressure injury with small amount of slough noted to patient's sacral spine. (0.4cm x 0.4cm x 0.2cm)   Wound Care consulted  Recommend applying triad barrier cream qshift and as needed.   Turn every 2hrs. Low airloss overlay ordered for patient. Nursing to continue care.          Moderate malnutrition    - continue boost and dental soft diet per  "speech          Anemia    - Hgb stable; some concern for Hgb drop as outpatient. FOBT performed and positive; no gross evidence of bleed.          Fibrosis of lung    CT 5/7  "traction effects in the inferior aspect of the right middle lobe and inferior aspect of the basal segments of the right lower lobe suggesting fibrosis and consequent scarring"  Also  There are bilateral pleural calcifications peripherally and over the diaphragm consistent with asbestos related pleural disease.  Patient needs pulmonary f/u  Patient has chest congestion and cough currently, chest physiotherapy, deep suction, acupella, and mucinex ordered        Acute respiratory failure with hypoxia and hypercapnia    - Former tobacco abuse with history of COPD; concern for volume overload in ICU setting as well and underwent some diuresis. Respiratory status improved now; on 05/10 did have significant respiratory demand prompting CTA; negative for thromboembolic process.  - Continuing albuterol-ipratropium 2.5-0.5mg inhaled PRN, chest physio, acupella  - now on RA  -Last day of moxi on 5/20.   -Concern for aspiration, re-evaluated by speech 5/28 okay to continue dental soft diet with thin liquids.          Alcoholism /alcohol abuse    - History of alcohol abuse; continuing folic acid, thiamine daily.          VTE Risk Mitigation         Ordered     enoxaparin injection 40 mg  Daily      05/06/18 1016     Place sequential compression device  Until discontinued      05/06/18 0113     IP VTE LOW RISK PATIENT  Once      05/06/18 0113              SILVIA Bañuelos  Department of Hospital Medicine   [facility]  "

## 2018-05-31 NOTE — PLAN OF CARE
Problem: Patient Care Overview  Goal: Plan of Care Review  Dx: Pneumonia   Hx: HTN, COPD and alcoholism, who is a transfer from [facility] ICU to [facility] on mechanical ventilation after being found unresponsive at home    4/5: Transferred to [facility] on mechanical ventilation. Dopamine gtt off.   4/6: Pt extubated  5/7: Echo 50-55%         RN  MAP>65    *Pt likes NCIS channel 3        Outcome: Ongoing (interventions implemented as appropriate)  Patient remains AAOx4. No new complaints, changes in assessment or plan of care. Vitals remain stable on room air. Patient eating 100% of each meal. No BM for shift but adequate UOP. Waiting on placement.     Problem: Fall Risk (Adult)  Goal: Identify Related Risk Factors and Signs and Symptoms  Related risk factors and signs and symptoms are identified upon initiation of Human Response Clinical Practice Guideline (CPG)   Outcome: Ongoing (interventions implemented as appropriate)  Patient remains at functional status baseline. Able to work with PT and OT today, able to get up in commode at bedside and ambulated down the  with PT. Patient calling out appropriately for assistance when needed.

## 2018-05-31 NOTE — PLAN OF CARE
Problem: Patient Care Overview  Goal: Plan of Care Review  Dx: Pneumonia   Hx: HTN, COPD and alcoholism, who is a transfer from [facility] ICU to [facility] on mechanical ventilation after being found unresponsive at home    4/5: Transferred to [facility] on mechanical ventilation. Dopamine gtt off.   4/6: Pt extubated  5/7: Echo 50-55%         RN  MAP>65    *Pt likes NCIS channel 3        Outcome: Ongoing (interventions implemented as appropriate)  Pt AAOx4. No acute events overnight. Vitals signs stable. Safety maintained. Will continue to monitor.

## 2018-05-31 NOTE — PLAN OF CARE
CM received [facility] contact that is working w/ [facility] on auth via  - Judson p [phone]. CM left  requesting callback concerning pt's transfer to SNF and status of auth.    Update: 3pm - attempted to call Judson w/ [facility] again; N/A. MARK still awaiting callback.

## 2018-05-31 NOTE — PT/OT/SLP PROGRESS
Occupational Therapy   Treatment    Name: Ryder Boo  MRN: [id number]  Admitting Diagnosis:  Aspiration pneumonia       Recommendations:     Discharge Recommendations:    Discharge Equipment Recommendations:  bedside commode, walker, rolling, shower chair  Barriers to discharge:  Decreased caregiver support    Subjective     Communicated with: patient prior to session.  Pain/Comfort:  · Pain Rating 1: 0/10  · Pain Rating Post-Intervention 1: 0/10    Patients cultural, spiritual, Oriental orthodox conflicts given the current situation: none stated    Objective:     General Precautions: Standard, fall   Orthopedic Precautions:N/A   Braces: N/A     Occupational Performance:    Bed Mobility:    · Patient completed Supine to Sit with supervision  · Patient completed Sit to Supine with supervision     Functional Mobility/Transfers:  · Patient completed Sit <> Stand Transfer with minimum assistance  with  no assistive device   · Patient completed Toilet Transfer Stand Pivot technique bed<>BSC with moderate assistance with  no AD    Activities of Daily Living:  · UB Dressing: stand by assistance Donning and doffing back gown  · LB Dressing: supervision [place] and donning socks  · Toileting: maximal assistance      Patient left in chair position in bed with call button in reach, bed alarm on, nursing notified and all needs met.     [facility] 6 Click:  AMPAC Total Score: 21    Assessment:     Ryder Boo is a 63 y.o. male with a medical diagnosis of Aspiration pneumonia. Performance deficits affecting function are weakness, impaired endurance, impaired self care skills, impaired functional mobilty, gait instability, impaired balance, decreased safety awareness, decreased lower extremity function, decreased coordination. Patient tolerated treatment session and was motivated to complete tasks.     Rehab Prognosis:  good; patient would benefit from acute skilled OT services to address these deficits and reach maximum level of function.        Plan:     Patient to be seen 4 x/week to address the above listed problems via self-care/home management, therapeutic exercises, therapeutic activities  · Plan of Care Expires: 06/07/18  · Plan of Care Reviewed with: patient    This Plan of care has been discussed with the patient who was involved in its development and understands and is in agreement with the identified goals and treatment plan    GOALS:    Occupational Therapy Goals        Problem: Occupational Therapy Goal    Goal Priority Disciplines Outcome Interventions   Occupational Therapy Goal     OT, PT/OT Ongoing (interventions implemented as appropriate)    Description:  Goals to be met by: 5/22/18     Patient will increase functional independence with ADLs by performing:    Feeding with Supervision.  UE Dressing with Minimal Assistance. MET 5/16  LE Dressing with Moderate Assistance. MET 5/16  Grooming while standing with Contact Guard Assistance.  Toileting from bedside commode with Moderate Assistance for hygiene and clothing management.   Toilet transfer to bedside commode with Minimal Assistance .                        Time Tracking:     OT Date of Treatment: 05/31/18  OT Start Time: 1521  OT Stop Time: 1536  OT Total Time (min): 15 min    Billable Minutes:Self Care/Home Management 15    FRANCA Lockwood  5/31/2018

## 2018-05-31 NOTE — PLAN OF CARE
1:46pm  EDUAR spoke w/Maryann in Business Office at [facility].  She provide This [employer] w/[facility]'s contact # [phone] and reps name is Trina.  EDUAR provided stated info to MARK Pritchett and she will f/u w/Trina at [facility].      11:48 AM  EDUAR spoke w/rep Aaliyah at [facility]. She reported they have not heard any more from [facility].    This Wker requested the name and # of  at/[facility], in the hopes of reaching out to said person.  Aaliyah reported Maryann in their Business Office handles that piece and has requested info.    Aaliyah reported Maryann left for lunch but to phone her back at [phone], at approx. 1pm.    Rep Aaliyah reported should Maryann return before stated time she will have Maryann to phone [employer].    EDUAR following for DC needs.  EDUAR in communication with MARK.        Shasta Murray, DENISE  [facility]

## 2018-05-31 NOTE — PLAN OF CARE
Problem: Patient Care Overview  Goal: Plan of Care Review  Dx: Pneumonia   Hx: HTN, COPD and alcoholism, who is a transfer from [facility] ICU [place] [facility] [place] after being found unresponsive at home    4/5: [place] [facility] [place]. Dopamine gtt off.   4/6: Pt extubated  5/7: Echo 50-55%         RN  MAP>65    *Pt likes NCIS channel 3        Outcome: Ongoing (interventions implemented as appropriate)  POC reviewed with patient. VSS. Tolerating diet. Denied pain and nausea. Using urinal, adequate urine output. Pt. Remained free from falls and trauma. Call light in reach. WCTM.

## 2018-05-31 NOTE — PLAN OF CARE
Problem: Occupational Therapy Goal  Goal: Occupational Therapy Goal  Goals to be met by: 5/22/18     Patient will increase functional independence with ADLs by performing:    Feeding with Supervision.  UE Dressing with Minimal Assistance. MET 5/16  LE Dressing with Moderate Assistance. MET 5/16  Grooming while standing with Contact Guard Assistance.  Toileting from bedside commode with Moderate Assistance for hygiene and clothing management.   Toilet transfer to bedside commode with Minimal Assistance .       Outcome: Ongoing (interventions implemented as appropriate)  Patient's goals are appropriate.   FRANCA Lockwood  5/31/2018

## 2018-05-31 NOTE — PT/OT/SLP PROGRESS
"Physical Therapy Treatment    Patient Name:  Ryder Boo   MRN:  [id number]    Recommendations:     Discharge Recommendations:  nursing facility, skilled   Discharge Equipment Recommendations: bedside commode, walker, rolling, shower chair   Barriers to discharge: Inaccessible home and Decreased caregiver support    Assessment:     Ryder Boo is a 63 y.o. male admitted with a medical diagnosis of Aspiration pneumonia.  He presents with the following impairments/functional limitations:  weakness, impaired endurance, impaired self care skills, impaired functional mobilty, gait instability, impaired balance, decreased safety awareness, decreased lower extremity function, decreased coordination . Patient at times was anxious, but showed good ability to comply with instructions and follow them. Decreased coordination and inconsistent B LE step length noted, more pronounced with the L LE.    Rehab Prognosis:  good; patient would benefit from acute skilled PT services to address these deficits and reach maximum level of function.      Recent Surgery: * No surgery found *      Plan:     During this hospitalization, patient to be seen 4 x/week to address the above listed problems via gait training, therapeutic activities, therapeutic exercises, neuromuscular re-education  · Plan of Care Expires:  06/06/18   Plan of Care Reviewed with: patient    Subjective     Communicated with RN prior to session.  Patient found in supine upon PT entry to room, agreeable to treatment.  Patient states " it feels good, I need to walk".      Chief Complaint: weakness  Patient comments/goals: to go home  Pain/Comfort:  · Pain Rating 1: 0/10  · Pain Rating Post-Intervention 1: 0/10    Patients cultural, spiritual, Advent conflicts given the current situation: None stated    Objective:     Patient found with: bed alarm     General Precautions: Standard, fall   Orthopedic Precautions:N/A   Braces: N/A     Functional Mobility:  · Bed " Mobility:     · Rolling Left:  modified independence  · Scooting: modified independence  · Supine to Sit: modified independence  · Sit to Supine: modified independence  · Transfers:     · Sit to Stand:  contact guard assistance with rolling walker  · Bed to Chair: contact guard assistance with  rolling walker  using  Stand Pivot  · Gait: 40 ft and 24 ft with RW and min assistance with chair follow.      AM-PAC 6 CLICK MOBILITY  Turning over in bed (including adjusting bedclothes, sheets and blankets)?: 4  Sitting down on and standing up from a chair with arms (e.g., wheelchair, bedside commode, etc.): 3  Moving from lying on back to sitting on the side of the bed?: 4  Moving to and from a bed to a chair (including a wheelchair)?: 3  Need to walk in hospital room?: 3  Climbing 3-5 steps with a railing?: 1  Total Score: 18       Therapeutic Activities and Exercises:   Donned/Doffed a gown. There ex in sitting: LAQ, HIP FLEX, HIP ABD AND HEEL/TOE RAISES 2X15 RESP B LE, alternating legs to work on coordination.    Patient left with bed in chair position with all lines intact, call button in reach, bed alarm on and RN notified..    GOALS:    Physical Therapy Goals        Problem: Physical Therapy Goal    Goal Priority Disciplines Outcome Goal Variances Interventions   Physical Therapy Goal     PT/OT, PT Ongoing (interventions implemented as appropriate)     Description:  Goals to be met by: 2018    Patient will increase functional independence with mobility by performin. Revised: Supine to sit with CGA- Met 2018  2. Revised: Sit to supine with CGA- MET  3. Revised: Sit-stand transfer with CGA using LRAD  4.Revised: Bed-chair transfer with Min Assist using LRAD- MET  5. Gait  x 20 feet with Moderate Assistance using LRAD or no AD -  met  6. Lower extremity exercise program x20 reps per handout, with supervision - met                             Time Tracking:     PT Received On: 18  PT Start Time:  0955     PT Stop Time: 1035  PT Total Time (min): 40 min     Billable Minutes: Gait Training 15, Therapeutic Activity 15 and Therapeutic Exercise 10    Treatment Type: Treatment  PT/PTA: PTA     PTA Visit Number: 3     Dann Benitez PTA  05/31/2018

## 2018-06-01 VITALS
SYSTOLIC BLOOD PRESSURE: 140 MMHG | RESPIRATION RATE: 17 BRPM | HEIGHT: 66 IN | DIASTOLIC BLOOD PRESSURE: 77 MMHG | BODY MASS INDEX: 25.86 KG/M2 | TEMPERATURE: 98 F | HEART RATE: 82 BPM | OXYGEN SATURATION: 93 % | WEIGHT: 160.94 LBS

## 2018-06-01 PROCEDURE — 94668 MNPJ CHEST WALL SBSQ: CPT

## 2018-06-01 PROCEDURE — 99239 HOSP IP/OBS DSCHRG MGMT >30: CPT | Mod: ,,, | Performed by: HOSPITALIST

## 2018-06-01 PROCEDURE — 25000003 PHARM REV CODE 250: Performed by: INTERNAL MEDICINE

## 2018-06-01 PROCEDURE — 27000646 HC AEROBIKA DEVICE

## 2018-06-01 PROCEDURE — 94664 DEMO&/EVAL PT USE INHALER: CPT

## 2018-06-01 PROCEDURE — 25000242 PHARM REV CODE 250 ALT 637 W/ HCPCS: Performed by: INTERNAL MEDICINE

## 2018-06-01 PROCEDURE — 63600175 PHARM REV CODE 636 W HCPCS: Performed by: STUDENT IN AN ORGANIZED HEALTH CARE EDUCATION/TRAINING PROGRAM

## 2018-06-01 PROCEDURE — 94761 N-INVAS EAR/PLS OXIMETRY MLT: CPT

## 2018-06-01 PROCEDURE — 94640 AIRWAY INHALATION TREATMENT: CPT

## 2018-06-01 PROCEDURE — 99900035 HC TECH TIME PER 15 MIN (STAT)

## 2018-06-01 RX ADMIN — THIAMINE HCL TAB 100 MG 100 MG: 100 TAB at 09:06

## 2018-06-01 RX ADMIN — ENOXAPARIN SODIUM 40 MG: 100 INJECTION SUBCUTANEOUS at 04:06

## 2018-06-01 RX ADMIN — FOLIC ACID 1 MG: 1 TABLET ORAL at 09:06

## 2018-06-01 RX ADMIN — GUAIFENESIN 600 MG: 600 TABLET, EXTENDED RELEASE ORAL at 09:06

## 2018-06-01 RX ADMIN — CETIRIZINE HYDROCHLORIDE 5 MG: 5 TABLET, FILM COATED ORAL at 09:06

## 2018-06-01 RX ADMIN — IPRATROPIUM BROMIDE AND ALBUTEROL SULFATE 3 ML: .5; 3 SOLUTION RESPIRATORY (INHALATION) at 08:06

## 2018-06-01 RX ADMIN — IPRATROPIUM BROMIDE AND ALBUTEROL SULFATE 3 ML: .5; 3 SOLUTION RESPIRATORY (INHALATION) at 02:06

## 2018-06-01 NOTE — PLAN OF CARE
3:14 PM  SW spoke w/rep Aaliyah at [facility], Pt is accepted at facility.    Report to be phoned to [phone], room # 64A.    Josue kristy will arrive at 5pm to transport Pt to [facility] [place] [address]   [phone]    Notified JAIME Scales [phone] of the above stated arrangements.      Shasta Murray, DENISE  [facility]

## 2018-06-01 NOTE — PLAN OF CARE
CM spoke to pt's sister, Sejal - stated that she is mtg to complete paperwork at 1pm. CM contacted [facility] to verify and this is correct.

## 2018-06-01 NOTE — PLAN OF CARE
10:15 AM  SW spoke w/Aaliyah at [facility], she reported she needs the SNF Orders A.S.A.P.  Reported after she speaks w/family regarding finances, she will be ready to accept Pt.      EDUAR following for DC needs.  EDUAR in communication with GIOVANNI Murray, DENISE  [facility]

## 2018-06-01 NOTE — PLAN OF CARE
MARK forwarded updated SNF orders from 5/29 to today's date and sent to [facility] thru . Relayed info to EDUAR.

## 2018-06-01 NOTE — PROGRESS NOTES
[facility] Hospital Medicine  Progress Note    Patient Name: Ryder Boo  MRN: [id number]  Patient Class: IP- Inpatient   Admission Date: 5/6/2018  Length of Stay: 26 days  Attending Physician: Shaylee Chao MD  Primary Care Provider: Swapnil Heart MD    [facility] Medicine Team: [facility]OSP MED 4 Eladia Barbour MD    Subjective:     Principal Problem:Aspiration pneumonia    HPI:  The patient is a 64 y/o M new to our system, with HTN, COPD and alcoholism, who is transfer from [facility] ICU to [facility] on mechanical ventilation.     The patient lives with his brother across the street from his sister. He was found unresponsive in a chair with small amount of blood in his mouth by his nephew and taken to the hospital by EMS on 5/3. Patient drinks every day and has been having frequent falls recently. Per sister he has quit smoking 15 years ago.  In the ED at OSH he was found to be hypoxic and hypotensive, with undetectable EtOH levels, leukocytosis (24K) and hyponatremia (119) in the labs. he received narcan x1, IV fluids, started on metronidazole and ceftriaxone (also x1 of zosyn, azithromycin, vancomycin, ceftaroline) for aspiration pneumonia, and was put on 15L of Oxygen with non-rebreather, which improved his mental status and BP, however on 4/5 upon weaning off of oxygen he developed hypoxic hypercapnic respiratory failure and subsequent acidosis (pH 7.10), therefore he was intubated and transferred to the ICU. Patient arrived to [facility] on dopamine gtt which per notes was started to maintain BP in the setting of hyponatremia.     Hospital Course:  5/6: upon arrival, patient was taken off of dopamine gtt and received 1.5L IV fluids to maintain MAPs. Continued on vanc/zosyn/azithromycin. Hyponatremia and ROHITH continued to improved with IVF. He was extubated in early afternoon and maintained normal O2 saturations on 3-4L oxygen on NC.   5/7: early morning he was noticed to be rattling while  breathing by the nurse. Upon examination he had increased work of breathing with worsening of bilateral rhonchi. Repeat CXR revealed interval worsening of the patchy infiltrations. Received x1 furosemide 60 mg IV and transitioned to high flow oxygen for possible development of ARDS, he is currently maintaining normal saturations, will keep on intubation watch.  5/8: several episodes of desaturation down to 70% overnight and early morning, that responded well to increasing oxygen to 100%, currently on 35L/50%, mildly drowsy due to precedex. Responded well to diuretics, net -2.8L. Will discontinue precedex and will have speech evaluate his swallowing.  5/9: Uneventful night off precedex. Remains confused, oriented to person and place. Oxygen requirements greatly improved 15L/50% with plan to wean to low-flow NC today. Diuresed -2.9L yesterday and discontinued lasix. Discontinued azithromycin and continued on zosyn. Failed speech eval by SLP and remains NPO  5/11-5/12 patient had NG tube placed, removed it and re-ordered on gentle tube feeds. On high o2 support at night as patient cannot have bipap due to NG.  05/14/2018 -Patient passed swallow eval to full liquids. He is now sating 100 % RA family updated awaiting rehab evaluation   05/17/2018 Patient was desating in the morning to 85-88 but he was alert oriented and no complaints gave him a breathing treatment   05/18/2018 awaiting placement  05/19/2018 CT, requested by rehab to r/o stroke, shows mild signs of possible NPH. Will order f/u o/p neuro. Patient reports x3 BMs, will see if continues. Will complete 1 more day of moxi.   05/20/2018 no acute events overnight patient on last day of moxi  Subsequently  Patient has been awaiting placement, added chest physiotherapy, deep suction, and acupella for chest congestion; also for upper airway congestion, added flonase and mucinex; procal negative for pna, cxr 5/28 with some superimpose edema, speech re-eval for  aspiration, okay for dental soft diet. Given one dose of 20mg IV lasix    Interval History: Patient cough and congestion improved.    Review of Systems   Constitutional: Negative for activity change, chills, fatigue and fever.   HENT: Positive for congestion. Negative for drooling, hearing loss, trouble swallowing and voice change.    Eyes: Negative for pain and visual disturbance.   Respiratory: Positive for cough. Negative for shortness of breath and wheezing.    Cardiovascular: Negative for chest pain and palpitations.   Gastrointestinal: Negative for abdominal pain, nausea and vomiting.   Genitourinary: Negative for difficulty urinating and flank pain.   Musculoskeletal: Positive for gait problem. Negative for arthralgias, back pain, myalgias and neck pain.   Skin: Negative for rash and wound.   Neurological: Negative for dizziness, weakness, numbness and headaches.   Psychiatric/Behavioral: Negative for agitation, confusion and hallucinations. The patient is not nervous/anxious.      Objective:     Vital Signs (Most Recent):  Temp: 97.7 °F (36.5 °C) (06/01/18 1118)  Pulse: 94 (06/01/18 1118)  Resp: 17 (06/01/18 1118)  BP: 124/71 (06/01/18 1118)  SpO2: (!) 94 % (06/01/18 1118) Vital Signs (24h Range):  Temp:  [97.3 °F (36.3 °C)-98.7 °F (37.1 °C)] 97.7 °F (36.5 °C)  Pulse:  [] 94  Resp:  [16-20] 17  SpO2:  [92 %-98 %] 94 %  BP: (116-137)/(71-87) 124/71     Weight: 73 kg (160 lb 15 oz)  Body mass index is 25.98 kg/m².    Intake/Output Summary (Last 24 hours) at 06/01/18 1246  Last data filed at 05/31/18 1525   Gross per 24 hour   Intake                0 ml   Output              600 ml   Net             -600 ml      Physical Exam   Constitutional: He appears well-developed and well-nourished. No distress.   HENT:   Head: Normocephalic and atraumatic.   Right Ear: External ear normal.   Left Ear: External ear normal.   Nose: Nose normal.   Eyes: Right eye exhibits no discharge. Left eye exhibits no discharge.  "No scleral icterus.   Neck: Normal range of motion.   Cardiovascular: Normal rate, regular rhythm and intact distal pulses.    Pulmonary/Chest: Effort normal. No respiratory distress. He has no wheezes.   Insp crackles in RLL and LML and LLL   Abdominal: Soft. He exhibits no distension. There is no tenderness.   Musculoskeletal: Normal range of motion. He exhibits no edema or tenderness.   Neurological: He is alert. He exhibits normal muscle tone.   Skin: Skin is warm and dry. No rash noted.   Psychiatric: He has a normal mood and affect. His behavior is normal. His speech is not slurred. Cognition and memory are not impaired.   Vitals reviewed.      Significant Labs:   CBC:   No results for input(s): WBC, HGB, HCT, PLT in the last 48 hours.  CMP:     Recent Labs  Lab 05/31/18  0439      K 3.7      CO2 27   GLU 80   BUN 18   CREATININE 0.8   CALCIUM 9.3   ANIONGAP 8   EGFRNONAA >60.0     All pertinent labs within the past 24 hours have been reviewed.    Significant Imaging: I have reviewed and interpreted all pertinent imaging results/findings within the past 24 hours.    Assessment/Plan:      * Aspiration pneumonia    - At OSH had significant leukocytosis with WBCs to 38K.  - CXR with bilateral multifocal infiltration.  - Suspect aspiration secondary to AMS.  -Completed course of Moxi. Last day was 5/20.         Acute respiratory failure with hypoxia and hypercapnia    - Former tobacco abuse with history of COPD; concern for volume overload in ICU setting as well and underwent some diuresis. Respiratory status improved now; on 05/10 did have significant respiratory demand prompting CTA; negative for thromboembolic process.  - Continuing albuterol-ipratropium 2.5-0.5mg inhaled PRN, chest physio, acupella  - now on RA  -Last day of moxi on 5/20.   -Concern for aspiration, re-evaluated by speech 5/28 okay to continue dental soft diet with thin liquids.          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  Patient needs pulmonary f/u  Patient has chest congestion and cough currently, chest physiotherapy, deep suction, acupella, and mucinex ordered        Gait disturbance    CTH 5/18 with Moderate diffuse dilation of the ventricular system, possibly normal pressure hydrocephalus.  Will need outpatient neurology f/u        Pressure injury of skin and subcutaneous tissue, stage 3    Stage 3 pressure injury with small amount of slough noted to patient's sacral spine. (0.4cm x 0.4cm x 0.2cm)   Wound Care consulted  Recommend applying triad barrier cream qshift and as needed.   Turn every 2hrs. Low airloss overlay ordered for patient. Nursing to continue care.          Moderate malnutrition    - continue boost and dental soft diet per speech          Anemia    - Hgb stable; some concern for Hgb drop as outpatient. FOBT performed and positive; no gross evidence of bleed.          Alcoholism /alcohol abuse    - History of alcohol abuse; continuing folic acid, thiamine daily.          VTE Risk Mitigation         Ordered     enoxaparin injection 40 mg  Daily      05/06/18 1016     Place sequential compression device  Until discontinued      05/06/18 0113     IP VTE LOW RISK PATIENT  Once      05/06/18 0113              Eladia Barbour MD  Department of Hospital Medicine   [facility]  "

## 2018-06-01 NOTE — PLAN OF CARE
Problem: Patient Care Overview  Goal: Plan of Care Review  Dx: Pneumonia   Hx: HTN, COPD and alcoholism, who is a transfer from [facility] ICU [place] [facility] [place] after being found unresponsive at home    4/5: Transferred [place] [facility] [place]. Dopamine gtt off.   4/6: Pt extubated  5/7: Echo 50-55%         RN  MAP>65    *Pt likes [employer] channel 3        Outcome: Ongoing (interventions implemented as appropriate)  Pt AAOx4. No acute events overnight. Safety maintained. Vitals stable. WCTM

## 2018-06-01 NOTE — SUBJECTIVE & OBJECTIVE
Interval History: Patient cough and congestion improved.    Review of Systems   Constitutional: Negative for activity change, chills, fatigue and fever.   HENT: Positive for congestion. Negative for drooling, hearing loss, trouble swallowing and voice change.    Eyes: Negative for pain and visual disturbance.   Respiratory: Positive for cough. Negative for shortness of breath and wheezing.    Cardiovascular: Negative for chest pain and palpitations.   Gastrointestinal: Negative for abdominal pain, nausea and vomiting.   Genitourinary: Negative for difficulty urinating and flank pain.   Musculoskeletal: Positive for gait problem. Negative for arthralgias, back pain, myalgias and neck pain.   Skin: Negative for rash and wound.   Neurological: Negative for dizziness, weakness, numbness and headaches.   Psychiatric/Behavioral: Negative for agitation, confusion and hallucinations. The patient is not nervous/anxious.      Objective:     Vital Signs (Most Recent):  Temp: 97.7 °F (36.5 °C) (06/01/18 1118)  Pulse: 94 (06/01/18 1118)  Resp: 17 (06/01/18 1118)  BP: 124/71 (06/01/18 1118)  SpO2: (!) 94 % (06/01/18 1118) Vital Signs (24h Range):  Temp:  [97.3 °F (36.3 °C)-98.7 °F (37.1 °C)] 97.7 °F (36.5 °C)  Pulse:  [] 94  Resp:  [16-20] 17  SpO2:  [92 %-98 %] 94 %  BP: (116-137)/(71-87) 124/71     Weight: 73 kg (160 lb 15 oz)  Body mass index is 25.98 kg/m².    Intake/Output Summary (Last 24 hours) at 06/01/18 1246  Last data filed at 05/31/18 1525   Gross per 24 hour   Intake                0 ml   Output              600 ml   Net             -600 ml      Physical Exam   Constitutional: He appears well-developed and well-nourished. No distress.   HENT:   Head: Normocephalic and atraumatic.   Right Ear: External ear normal.   Left Ear: External ear normal.   Nose: Nose normal.   Eyes: Right eye exhibits no discharge. Left eye exhibits no discharge. No scleral icterus.   Neck: Normal range of motion.   Cardiovascular:  Normal rate, regular rhythm and intact distal pulses.    Pulmonary/Chest: Effort normal. No respiratory distress. He has no wheezes.   Insp crackles in RLL and LML and LLL   Abdominal: Soft. He exhibits no distension. There is no tenderness.   Musculoskeletal: Normal range of motion. He exhibits no edema or tenderness.   Neurological: He is alert. He exhibits normal muscle tone.   Skin: Skin is warm and dry. No rash noted.   Psychiatric: He has a normal mood and affect. His behavior is normal. His speech is not slurred. Cognition and memory are not impaired.   Vitals reviewed.      Significant Labs:   CBC:   No results for input(s): WBC, HGB, HCT, PLT in the last 48 hours.  CMP:     Recent Labs  Lab 05/31/18  0439      K 3.7      CO2 27   GLU 80   BUN 18   CREATININE 0.8   CALCIUM 9.3   ANIONGAP 8   EGFRNONAA >60.0     All pertinent labs within the past 24 hours have been reviewed.    Significant Imaging: I have reviewed and interpreted all pertinent imaging results/findings within the past 24 hours.

## 2018-06-01 NOTE — NURSING
Patient discharged via wheelchair per [facility] transportation to [facility]. Patient dressed in paper scrubs since family took home all patients belongings. Patient awake and alert, no distress, aware of transfer to SNF. Attempted to call report to facility but was placed on hold and no one ever returned to phone. Will attempt again.

## 2018-06-01 NOTE — PLAN OF CARE
MARK received VM from Miesha who was covering for [facility] that pt has been approved to transfer to SNF (auth for 7 days) and the auth #[id number] was called into [facility] at 4:30pn EST. CM will relay to 4 team and EDUAR.

## 2018-06-01 NOTE — PLAN OF CARE
11:32 AM  SW spoke w/rep Aaliyah w/[facility] [phone].  She will go to Pt's sister Sejal's home to have her complete the financial documents today at 1pm.  Reported she'd phone This Wker back after documents are signed.  Pt's sister Sejal iniatially did not want Pt to go to [facility].  This Wker and rep Aaliyah w/[facility] addressed Sejal's concerns and she is not proceeding forward w/admit at [facility].    Transpo is arranged w/[facility] and is in Will Call status presently.    EDUAR following for DC needs.  EDUAR in communication with GIOVANNI Murray, MSW  [facility]

## 2018-06-05 NOTE — DISCHARGE SUMMARY
[facility] Hospital Medicine  Discharge Summary      Patient Name: Ryder Boo  MRN: [id number]  Admission Date: 5/6/2018  Hospital Length of Stay: 26 days  Discharge Date and Time: 6/1/2018  5:29 PM  Attending Physician: No att. providers found   Discharging Provider: Eladia Barbour MD  Primary Care Provider: Swapnil Heart MD  [facility] Medicine Team: [facility] MED 4 Eladia Barbour MD    HPI:   The patient is a 62 y/o M new to our system, with HTN, COPD and alcoholism, who is transfer from [facility] ICU to [facility] on mechanical ventilation.     The patient lives with his brother across the street from his sister. He was found unresponsive in a chair with small amount of blood in his mouth by his nephew and taken to the hospital by EMS on 5/3. Patient drinks every day and has been having frequent falls recently. Per sister he has quit smoking 15 years ago.  In the ED at [facility] he was found to be hypoxic and hypotensive, with undetectable EtOH levels, leukocytosis (24K) and hyponatremia (119) in the labs. he received narcan x1, IV fluids, started on metronidazole and ceftriaxone (also x1 of zosyn, azithromycin, vancomycin, ceftaroline) for aspiration pneumonia, and was put on 15L of Oxygen with non-rebreather, which improved his mental status and BP, however on 4/5 upon weaning off of oxygen he developed hypoxic hypercapnic respiratory failure and subsequent acidosis (pH 7.10), therefore he was intubated and transferred to the ICU. Patient arrived to [facility] on dopamine gtt which per notes was started to maintain BP in the setting of hyponatremia.     * No surgery found *      Hospital Course:   5/6: upon arrival, patient was taken off of dopamine gtt and received 1.5L IV fluids to maintain MAPs. Continued on vanc/zosyn/azithromycin. Hyponatremia and ROHITH continued to improved with IVF. He was extubated in early afternoon and maintained normal O2 saturations on 3-4L oxygen on NC.   5/7: early  morning he was noticed to be rattling while breathing by the nurse. Upon examination he had increased work of breathing with worsening of bilateral rhonchi. Repeat CXR revealed interval worsening of the patchy infiltrations. Received x1 furosemide 60 mg IV and transitioned to high flow oxygen for possible development of ARDS, he is currently maintaining normal saturations, will keep on intubation watch.  5/8: several episodes of desaturation down to 70% overnight and early morning, that responded well to increasing oxygen to 100%, currently on 35L/50%, mildly drowsy due to precedex. Responded well to diuretics, net -2.8L. Will discontinue precedex and will have speech evaluate his swallowing.  5/9: Uneventful night off precedex. Remains confused, oriented to person and place. Oxygen requirements greatly improved 15L/50% with plan to wean to low-flow NC today. Diuresed -2.9L yesterday and discontinued lasix. Discontinued azithromycin and continued on zosyn. Failed speech eval by SLP and remains NPO  5/11-5/12 patient had NG tube placed, removed it and re-ordered on gentle tube feeds. On high o2 support at night as patient cannot have bipap due to NG.  05/14/2018 -Patient passed swallow eval to full liquids. He is now sating 100 % RA family updated awaiting rehab evaluation   05/17/2018 Patient was desating in the morning to 85-88 but he was alert oriented and no complaints gave him a breathing treatment   05/18/2018 awaiting placement  05/19/2018 CT, requested by rehab to r/o stroke, shows mild signs of possible NPH. Will order f/u o/p neuro. Patient reports x3 BMs, will see if continues. Will complete 1 more day of moxi.   05/20/2018 no acute events overnight patient on last day of moxi  Subsequently  Patient has been awaiting placement, added chest physiotherapy, deep suction, and acupella for chest congestion; also for upper airway congestion, added flonase and mucinex; procal negative for pna, cxr 5/28 with some  "superimpose edema, speech re-eval for aspiration, okay for dental soft diet. Given one dose of 20mg IV lasix     Consults:   Consults         Status Ordering Provider     Inpatient consult to Midline team  Once     Provider:  (Not yet assigned)    Completed IRLANDA COUCH     Inpatient consult to Physical Medicine Rehab  Once     Provider:  (Not yet assigned)    Completed MONTRELL GARRETT     Inpatient consult to Registered Dietitian/Nutritionist  Once     Provider:  (Not yet assigned)    Completed JOE LOMAX          Acute respiratory failure with hypoxia and hypercapnia    - Former tobacco abuse with history of COPD; concern for volume overload in ICU setting as well and underwent some diuresis. Respiratory status improved now; on 05/10 did have significant respiratory demand prompting CTA; negative for thromboembolic process.  - Continuing albuterol-ipratropium 2.5-0.5mg inhaled PRN, chest physio, acupella  - now on RA  -Last day of moxi on 5/20.   -Concern for aspiration, re-evaluated by speech 5/28 okay to continue dental soft diet with thin liquids.          Fibrosis of lung    CT 5/7  "traction effects in the inferior aspect of the right middle lobe and inferior aspect of the basal segments of the right lower lobe suggesting fibrosis and consequent scarring"  Also  There are bilateral pleural calcifications peripherally and over the diaphragm consistent with asbestos related pleural disease.  Patient needs pulmonary f/u  Patient has chest congestion and cough currently, chest physiotherapy, deep suction, acupella, and mucinex ordered        Gait disturbance    CTH 5/18 with Moderate diffuse dilation of the ventricular system, possibly normal pressure hydrocephalus.  Will need outpatient neurology f/u        Pressure injury of skin and subcutaneous tissue, stage 3    Stage 3 pressure injury with small amount of slough noted to patient's sacral spine. (0.4cm x 0.4cm x 0.2cm)   Wound Care " consulted  Recommend applying triad barrier cream qshift and as needed.   Turn every 2hrs. Low airloss overlay ordered for patient. Nursing to continue care.          Moderate malnutrition    - continue boost and dental soft diet per speech          Anemia    - Hgb stable; some concern for Hgb drop as outpatient. FOBT performed and positive; no gross evidence of bleed.          Alcoholism /alcohol abuse    - History of alcohol abuse; continuing folic acid, thiamine daily.          Final Active Diagnoses:    Diagnosis Date Noted POA    PRINCIPAL PROBLEM:  Aspiration pneumonia [J69.0] 05/05/2018 Yes    Acute respiratory failure with hypoxia and hypercapnia [J96.01, J96.02] 05/09/2018 Yes    Fibrosis of lung [J84.10] 05/09/2018 Yes    Gait disturbance [R26.9] 05/24/2018 Unknown    Pressure injury of skin and subcutaneous tissue, stage 3 [L89.93] 05/14/2018 Yes    Moderate malnutrition [E44.0] 05/11/2018 Yes    Anemia [D64.9] 05/10/2018 Yes    Alcoholism /alcohol abuse [F10.20] 05/06/2018 Yes      Problems Resolved During this Admission:    Diagnosis Date Noted Date Resolved POA    Impaired mobility and ADLs [Z74.09] 05/31/2018 05/31/2018 Yes    Hypernatremia [E87.0] 05/13/2018 05/21/2018 Yes    Encephalopathy, metabolic [G93.41] 05/10/2018 05/21/2018 Yes    Hypomagnesemia [E83.42] 05/10/2018 05/22/2018 No    Hyponatremia [E87.1] 05/06/2018 05/08/2018 Yes    ROHITH (acute kidney injury) [N17.9] 05/06/2018 05/21/2018 Yes    Sepsis [A41.9] 05/06/2018 05/21/2018 Yes       Discharged Condition: stable    Disposition: Skilled Nursing Facility    Follow Up:    Patient Instructions:     Ambulatory Referral to Pulmonology   Referral Priority: Routine Referral Type: Consultation   Referral Reason: Specialty Services Required    Requested Specialty: Pulmonary Disease    Number of Visits Requested: 1      Ambulatory Referral to Neurology   Referral Priority: Routine Referral Type: Consultation   Referral Reason:  Specialty Services Required    Requested Specialty: Neurology    Number of Visits Requested: 1          Significant Diagnostic Studies: Labs: All labs within the past 24 hours have been reviewed    Pending Diagnostic Studies:     Procedure Component Value Units Date/Time    Basic metabolic panel [[id number]] Collected:  05/14/18 0330    Order Status:  Sent Lab Status:  In process Updated:  05/14/18 0331    Specimen:  Blood from Blood     Quantiferon Gold TB [[id number]] Collected:  05/07/18 1037    Order Status:  Sent Lab Status:  In process Updated:  05/07/18 1038    Specimen:  Blood from Blood          Medications:  Reconciled Home Medications:      Medication List      START taking these medications    * albuterol-ipratropium 2.5 mg-0.5 mg/3 mL nebulizer solution  Commonly known as:  DUO-NEB  Take 3 mLs by nebulization every 4 (four) hours as needed for Wheezing or Shortness of Breath. Rescue     * albuterol-ipratropium 2.5 mg-0.5 mg/3 mL nebulizer solution  Commonly known as:  DUO-NEB  Take 3 mLs by nebulization every 6 (six) hours. Rescue     cetirizine 5 MG tablet  Commonly known as:  ZYRTEC  Take 1 tablet (5 mg total) by mouth once daily.     fluticasone 50 mcg/actuation nasal spray  Commonly known as:  FLONASE  1 spray (50 mcg total) by Each Nare route once daily.     folic acid 1 MG tablet  Commonly known as:  FOLVITE  Take 1 tablet (1 mg total) by mouth once daily.     guaiFENesin 600 mg 12 hr tablet  Commonly known as:  MUCINEX  Take 1 tablet (600 mg total) by mouth 2 (two) times daily.  Replaces:  guaifenesin 100 mg/5 ml 100 mg/5 mL syrup     thiamine 100 MG tablet  Take 1 tablet (100 mg total) by mouth once daily.     white petrolatum 41 % Oint  Apply topically as needed.        * This list has 2 medication(s) that are the same as other medications prescribed for you. Read the directions carefully, and ask your doctor or other care provider to review them with you.            CONTINUE taking these  medications    chlorthalidone 25 MG Tab  Commonly known as:  HYGROTEN  Take 25 mg by mouth once daily.     ibuprofen 200 MG tablet  Commonly known as:  ADVIL,MOTRIN  Take 200 mg by mouth daily as needed (headache).        STOP taking these medications    furosemide 40 MG tablet  Commonly known as:  LASIX     guaifenesin 100 mg/5 ml 100 mg/5 mL syrup  Commonly known as:  ROBITUSSIN  Replaced by:  guaiFENesin 600 mg 12 hr tablet     loperamide 2 mg capsule  Commonly known as:  IMODIUM     losartan 100 MG tablet  Commonly known as:  COZAAR            Indwelling Lines/Drains at time of discharge:   Lines/Drains/Airways     Pressure Ulcer                 Pressure Injury 05/10/18 0900 Sacral spine Stage 3 26 days                Time spent on the discharge of patient: 45 minutes  Patient was seen and examined on the date of discharge and determined to be suitable for discharge.         Eladia Barbour MD  Department of Hospital Medicine  [facility]

## 2021-10-01 PROBLEM — F10.20 ALCOHOL DEPENDENCE: Status: ACTIVE | Noted: 2018-05-06

## 2022-12-13 DIAGNOSIS — R22.43 LOCALIZED SWELLING, MASS, OR LUMP OF LOWER EXTREMITY, BILATERAL: ICD-10-CM

## 2022-12-13 DIAGNOSIS — M79.605 BILATERAL LOWER EXTREMITY PAIN: ICD-10-CM

## 2022-12-13 DIAGNOSIS — I73.9 PVD (PERIPHERAL VASCULAR DISEASE): Primary | ICD-10-CM

## 2022-12-13 DIAGNOSIS — M79.604 BILATERAL LOWER EXTREMITY PAIN: ICD-10-CM

## 2022-12-14 ENCOUNTER — HOSPITAL ENCOUNTER (OUTPATIENT)
Dept: RADIOLOGY | Facility: HOSPITAL | Age: 67
Discharge: HOME | End: 2022-12-14
Attending: PODIATRIST
Payer: MEDICARE

## 2022-12-14 DIAGNOSIS — M79.604 BILATERAL LOWER EXTREMITY PAIN: ICD-10-CM

## 2022-12-14 DIAGNOSIS — M79.605 BILATERAL LOWER EXTREMITY PAIN: ICD-10-CM

## 2022-12-14 DIAGNOSIS — I73.9 PVD (PERIPHERAL VASCULAR DISEASE): ICD-10-CM

## 2022-12-14 DIAGNOSIS — R22.43 LOCALIZED SWELLING, MASS, OR LUMP OF LOWER EXTREMITY, BILATERAL: ICD-10-CM

## 2022-12-14 PROCEDURE — 93925 LOWER EXTREMITY STUDY: CPT | Mod: TC

## 2024-04-24 NOTE — CARE UPDATE
Rapid Response Nurse Note     Rapid Response Metrics:     Admit Date: 2018  LOS: 3  Code Status: Full Code   Date of Consult: 2018  : 1955  Age: 63 y.o.  Weight:   Wt Readings from Last 1 Encounters:   18 80.6 kg (177 lb 11.1 oz)     Race:    Sex: male  Bed: [address] [place] [address] A:   MRN: [id number]  Time Rapid Response Team at Bedside: 1431  Time Rapid Response Team left Bedside: 1451  Was the patient discharged from an ICU this admission? Yes   Was the patient discharged from a PACU within last 24 hours? no  Did the patient receive conscious sedation/general anesthesia within last 24 hours? no  Was the patient in the ED within the past 24 hours? no  Was the patient started on NIPPV within the past 24 hours? No  Did this progress into an ARC or CPA: No  Attending Physician: Isabela Snyder MD  Primary Service: [facility] MED T  Consult Requested By: Isabela Snyder MD   Rapid Response Indication(s): Resp status  Disposition: 1049L    SITUATION:     Reason for Call:   Called to evaluate the patient for Respiratory    BACKGROUND:     Why is the patient in the hospital?: Pneumonia  What changed?: SpO2 dropped to 80%    ASSESSMENT:     What did you find: pt resting comfortably in bed, no acute distress noted. SpO2 on machine 85-87%. Different pulse ox tried ans SpO2 91%. Pt SpO2 increasing on own. 92%. Pt had just been transferred from ICU. Probable that O2 pt transported on not strong enough and pt needed time to recover    RECOMMENDATIONS:     We recommend: leaving highflow at current settings and weaning as tolerated for spo2 >88%    FOLLOW-UP/CONTINGENCY PLAN:     Call the rapid response Nurse at x [phone] for additional questions or concerns.      PHYSICIAN ESCALATION:     Orders received and case discussed with Dr. Snyder     Disposition: 1048D    .